# Patient Record
Sex: MALE | Race: WHITE | NOT HISPANIC OR LATINO | Employment: OTHER | URBAN - NONMETROPOLITAN AREA
[De-identification: names, ages, dates, MRNs, and addresses within clinical notes are randomized per-mention and may not be internally consistent; named-entity substitution may affect disease eponyms.]

---

## 2017-03-30 ENCOUNTER — HISTORY (OUTPATIENT)
Dept: FAMILY MEDICINE | Facility: OTHER | Age: 62
End: 2017-03-30

## 2017-03-30 ENCOUNTER — OFFICE VISIT - GICH (OUTPATIENT)
Dept: FAMILY MEDICINE | Facility: OTHER | Age: 62
End: 2017-03-30

## 2017-03-30 DIAGNOSIS — K60.2 ANAL FISSURE: ICD-10-CM

## 2017-03-30 DIAGNOSIS — L40.9 PSORIASIS: ICD-10-CM

## 2017-03-30 ASSESSMENT — PATIENT HEALTH QUESTIONNAIRE - PHQ9: SUM OF ALL RESPONSES TO PHQ QUESTIONS 1-9: 7

## 2017-04-14 ENCOUNTER — COMMUNICATION - GICH (OUTPATIENT)
Dept: INTERNAL MEDICINE | Facility: OTHER | Age: 62
End: 2017-04-14

## 2017-04-14 DIAGNOSIS — I10 ESSENTIAL (PRIMARY) HYPERTENSION: ICD-10-CM

## 2017-04-20 ENCOUNTER — COMMUNICATION - GICH (OUTPATIENT)
Dept: INTERNAL MEDICINE | Facility: OTHER | Age: 62
End: 2017-04-20

## 2017-04-20 DIAGNOSIS — I10 ESSENTIAL (PRIMARY) HYPERTENSION: ICD-10-CM

## 2017-05-04 ENCOUNTER — OFFICE VISIT - GICH (OUTPATIENT)
Dept: INTERNAL MEDICINE | Facility: OTHER | Age: 62
End: 2017-05-04

## 2017-05-04 ENCOUNTER — HISTORY (OUTPATIENT)
Dept: INTERNAL MEDICINE | Facility: OTHER | Age: 62
End: 2017-05-04

## 2017-05-04 DIAGNOSIS — R94.5 ABNORMAL RESULTS OF LIVER FUNCTION STUDIES: ICD-10-CM

## 2017-05-04 DIAGNOSIS — L40.9 PSORIASIS: ICD-10-CM

## 2017-05-04 DIAGNOSIS — Z00.00 ENCOUNTER FOR GENERAL ADULT MEDICAL EXAMINATION WITHOUT ABNORMAL FINDINGS: ICD-10-CM

## 2017-05-04 DIAGNOSIS — E78.5 HYPERLIPIDEMIA: ICD-10-CM

## 2017-05-04 DIAGNOSIS — I10 ESSENTIAL (PRIMARY) HYPERTENSION: ICD-10-CM

## 2017-05-04 LAB
A/G RATIO - HISTORICAL: 1.2 (ref 1–2)
ABSOLUTE BASOPHILS - HISTORICAL: 0.1 THOU/CU MM
ABSOLUTE EOSINOPHILS - HISTORICAL: 0.1 THOU/CU MM
ABSOLUTE LYMPHOCYTES - HISTORICAL: 1.7 THOU/CU MM (ref 0.9–2.9)
ABSOLUTE MONOCYTES - HISTORICAL: 0.7 THOU/CU MM
ABSOLUTE NEUTROPHILS - HISTORICAL: 5.1 THOU/CU MM (ref 1.7–7)
ALBUMIN SERPL-MCNC: 4.2 G/DL (ref 3.5–5.7)
ALP SERPL-CCNC: 122 IU/L (ref 34–104)
ALT (SGPT) - HISTORICAL: 48 IU/L (ref 7–52)
ANION GAP - HISTORICAL: 9 (ref 5–18)
AST SERPL-CCNC: 39 IU/L (ref 13–39)
BASOPHILS # BLD AUTO: 0.9 %
BILIRUB SERPL-MCNC: 0.5 MG/DL (ref 0.3–1)
BILIRUB UR QL: NEGATIVE
BUN SERPL-MCNC: 17 MG/DL (ref 7–25)
BUN/CREAT RATIO - HISTORICAL: 19
CALCIUM SERPL-MCNC: 9.4 MG/DL (ref 8.6–10.3)
CHLORIDE SERPLBLD-SCNC: 104 MMOL/L (ref 98–107)
CHOL/HDL RATIO - HISTORICAL: 4.18
CHOLESTEROL TOTAL: 184 MG/DL
CLARITY, URINE: CLEAR CLARITY
CO2 SERPL-SCNC: 26 MMOL/L (ref 21–31)
COLOR UR: YELLOW COLOR
CREAT SERPL-MCNC: 0.9 MG/DL (ref 0.7–1.3)
EOSINOPHIL NFR BLD AUTO: 1.3 %
ERYTHROCYTE [DISTWIDTH] IN BLOOD BY AUTOMATED COUNT: 12 % (ref 11.5–15.5)
GFR IF NOT AFRICAN AMERICAN - HISTORICAL: >60 ML/MIN/1.73M2
GLOBULIN - HISTORICAL: 3.4 G/DL (ref 2–3.7)
GLUCOSE SERPL-MCNC: 99 MG/DL (ref 70–105)
GLUCOSE URINE: NEGATIVE MG/DL
HCT VFR BLD AUTO: 52.4 % (ref 37–53)
HDLC SERPL-MCNC: 44 MG/DL (ref 23–92)
HEMOGLOBIN: 16.8 G/DL (ref 13.5–17.5)
KETONES UR QL: NEGATIVE MG/DL
LDLC SERPL CALC-MCNC: 125 MG/DL
LEUKOCYTE ESTERASE URINE: NEGATIVE
LYMPHOCYTES NFR BLD AUTO: 22.4 % (ref 20–44)
MCH RBC QN AUTO: 30.3 PG (ref 26–34)
MCHC RBC AUTO-ENTMCNC: 32.1 G/DL (ref 32–36)
MCV RBC AUTO: 94 FL (ref 80–100)
MONOCYTES NFR BLD AUTO: 9.5 %
NEUTROPHILS NFR BLD AUTO: 65.8 % (ref 42–72)
NITRITE UR QL STRIP: NEGATIVE
NON-HDL CHOLESTEROL - HISTORICAL: 140 MG/DL
OCCULT BLOOD,URINE - HISTORICAL: NEGATIVE
PATIENT STATUS - HISTORICAL: ABNORMAL
PH UR: 6 [PH]
PLATELET # BLD AUTO: 232 THOU/CU MM (ref 140–440)
PMV BLD: 9.9 FL (ref 6.5–11)
POTASSIUM SERPL-SCNC: 4.1 MMOL/L (ref 3.5–5.1)
PROT SERPL-MCNC: 7.6 G/DL (ref 6.4–8.9)
PROTEIN QUALITATIVE,URINE - HISTORICAL: NEGATIVE MG/DL
PSA TOTAL (DIAGNOSTIC) - HISTORICAL: 0.31 NG/ML
RED BLOOD COUNT - HISTORICAL: 5.55 MIL/CU MM (ref 4.3–5.9)
SODIUM SERPL-SCNC: 139 MMOL/L (ref 133–143)
SP GR UR STRIP: 1.02
TRIGL SERPL-MCNC: 76 MG/DL
UROBILINOGEN,QUALITATIVE - HISTORICAL: NORMAL EU/DL
WHITE BLOOD COUNT - HISTORICAL: 7.7 THOU/CU MM (ref 4.5–11)

## 2017-05-04 ASSESSMENT — PATIENT HEALTH QUESTIONNAIRE - PHQ9: SUM OF ALL RESPONSES TO PHQ QUESTIONS 1-9: 0

## 2017-05-06 ENCOUNTER — AMBULATORY - GICH (OUTPATIENT)
Dept: FAMILY MEDICINE | Facility: OTHER | Age: 62
End: 2017-05-06

## 2017-05-08 ENCOUNTER — COMMUNICATION - GICH (OUTPATIENT)
Dept: INTERNAL MEDICINE | Facility: OTHER | Age: 62
End: 2017-05-08

## 2017-05-09 ENCOUNTER — AMBULATORY - GICH (OUTPATIENT)
Dept: SCHEDULING | Facility: OTHER | Age: 62
End: 2017-05-09

## 2017-05-10 ENCOUNTER — AMBULATORY - GICH (OUTPATIENT)
Dept: SCHEDULING | Facility: OTHER | Age: 62
End: 2017-05-10

## 2017-05-10 ENCOUNTER — COMMUNICATION - GICH (OUTPATIENT)
Dept: INTERNAL MEDICINE | Facility: OTHER | Age: 62
End: 2017-05-10

## 2017-05-16 ENCOUNTER — COMMUNICATION - GICH (OUTPATIENT)
Dept: INTERNAL MEDICINE | Facility: OTHER | Age: 62
End: 2017-05-16

## 2017-05-22 ENCOUNTER — COMMUNICATION - GICH (OUTPATIENT)
Dept: INTERNAL MEDICINE | Facility: OTHER | Age: 62
End: 2017-05-22

## 2017-05-22 DIAGNOSIS — I10 ESSENTIAL (PRIMARY) HYPERTENSION: ICD-10-CM

## 2017-06-02 ENCOUNTER — OFFICE VISIT - GICH (OUTPATIENT)
Dept: FAMILY MEDICINE | Facility: OTHER | Age: 62
End: 2017-06-02

## 2017-06-02 ENCOUNTER — HISTORY (OUTPATIENT)
Dept: FAMILY MEDICINE | Facility: OTHER | Age: 62
End: 2017-06-02
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
## 2017-06-02 DIAGNOSIS — L03.312 CELLULITIS OF BACK EXCEPT BUTTOCK: ICD-10-CM

## 2017-06-26 ENCOUNTER — OFFICE VISIT - GICH (OUTPATIENT)
Dept: INTERNAL MEDICINE | Facility: OTHER | Age: 62
End: 2017-06-26

## 2017-06-26 ENCOUNTER — HISTORY (OUTPATIENT)
Dept: INTERNAL MEDICINE | Facility: OTHER | Age: 62
End: 2017-06-26

## 2017-06-26 ENCOUNTER — COMMUNICATION - GICH (OUTPATIENT)
Dept: INTERNAL MEDICINE | Facility: OTHER | Age: 62
End: 2017-06-26

## 2017-06-26 DIAGNOSIS — L40.9 PSORIASIS: ICD-10-CM

## 2017-06-27 ENCOUNTER — AMBULATORY - GICH (OUTPATIENT)
Dept: INTERNAL MEDICINE | Facility: OTHER | Age: 62
End: 2017-06-27

## 2017-06-27 ENCOUNTER — AMBULATORY - GICH (OUTPATIENT)
Dept: SCHEDULING | Facility: OTHER | Age: 62
End: 2017-06-27

## 2017-06-27 ENCOUNTER — COMMUNICATION - GICH (OUTPATIENT)
Dept: INTERNAL MEDICINE | Facility: OTHER | Age: 62
End: 2017-06-27

## 2017-06-27 DIAGNOSIS — L40.9 PSORIASIS: ICD-10-CM

## 2017-06-28 ENCOUNTER — AMBULATORY - GICH (OUTPATIENT)
Dept: SCHEDULING | Facility: OTHER | Age: 62
End: 2017-06-28

## 2017-07-06 ENCOUNTER — COMMUNICATION - GICH (OUTPATIENT)
Dept: INTERNAL MEDICINE | Facility: OTHER | Age: 62
End: 2017-07-06

## 2017-07-06 DIAGNOSIS — I10 ESSENTIAL (PRIMARY) HYPERTENSION: ICD-10-CM

## 2017-07-07 ENCOUNTER — COMMUNICATION - GICH (OUTPATIENT)
Dept: INTERNAL MEDICINE | Facility: OTHER | Age: 62
End: 2017-07-07

## 2017-07-07 DIAGNOSIS — L40.9 PSORIASIS: ICD-10-CM

## 2017-07-13 ENCOUNTER — AMBULATORY - GICH (OUTPATIENT)
Dept: FAMILY MEDICINE | Facility: OTHER | Age: 62
End: 2017-07-13

## 2017-07-13 DIAGNOSIS — L40.9 PSORIASIS: ICD-10-CM

## 2017-12-11 ENCOUNTER — OFFICE VISIT - GICH (OUTPATIENT)
Dept: INTERNAL MEDICINE | Facility: OTHER | Age: 62
End: 2017-12-11

## 2017-12-11 ENCOUNTER — HISTORY (OUTPATIENT)
Dept: INTERNAL MEDICINE | Facility: OTHER | Age: 62
End: 2017-12-11

## 2017-12-11 DIAGNOSIS — L40.9 PSORIASIS: ICD-10-CM

## 2017-12-11 DIAGNOSIS — R21 RASH AND OTHER NONSPECIFIC SKIN ERUPTION: ICD-10-CM

## 2017-12-11 DIAGNOSIS — M25.50 PAIN IN JOINT: ICD-10-CM

## 2017-12-11 LAB
ABSOLUTE BASOPHILS - HISTORICAL: 0 THOU/CU MM
ABSOLUTE EOSINOPHILS - HISTORICAL: 0.1 THOU/CU MM
ABSOLUTE IMMATURE GRANULOCYTES(METAS,MYELOS,PROS) - HISTORICAL: 0 THOU/CU MM
ABSOLUTE LYMPHOCYTES - HISTORICAL: 1.7 THOU/CU MM (ref 0.9–2.9)
ABSOLUTE MONOCYTES - HISTORICAL: 0.5 THOU/CU MM
ABSOLUTE NEUTROPHILS - HISTORICAL: 4.1 THOU/CU MM (ref 1.7–7)
ALBUMIN SERPL-MCNC: 3.8 G/DL (ref 3.5–5.7)
AST SERPL-CCNC: 31 IU/L (ref 13–39)
BASOPHILS # BLD AUTO: 0.3 %
CREAT SERPL-MCNC: 0.85 MG/DL (ref 0.7–1.3)
EOSINOPHIL NFR BLD AUTO: 0.9 %
ERYTHROCYTE [DISTWIDTH] IN BLOOD BY AUTOMATED COUNT: 12.4 % (ref 11.5–15.5)
GFR IF NOT AFRICAN AMERICAN - HISTORICAL: >60 ML/MIN/1.73M2
HCT VFR BLD AUTO: 48.4 % (ref 37–53)
HEMOGLOBIN: 16.3 G/DL (ref 13.5–17.5)
IMMATURE GRANULOCYTES(METAS,MYELOS,PROS) - HISTORICAL: 0.5 %
LYMPHOCYTES NFR BLD AUTO: 26.3 % (ref 20–44)
MCH RBC QN AUTO: 31.1 PG (ref 26–34)
MCHC RBC AUTO-ENTMCNC: 33.7 G/DL (ref 32–36)
MCV RBC AUTO: 92 FL (ref 80–100)
MONOCYTES NFR BLD AUTO: 8.2 %
NEUTROPHILS NFR BLD AUTO: 63.8 % (ref 42–72)
PLATELET # BLD AUTO: 193 THOU/CU MM (ref 140–440)
PMV BLD: 11 FL (ref 6.5–11)
RED BLOOD COUNT - HISTORICAL: 5.24 MIL/CU MM (ref 4.3–5.9)
WHITE BLOOD COUNT - HISTORICAL: 6.4 THOU/CU MM (ref 4.5–11)

## 2017-12-11 ASSESSMENT — PATIENT HEALTH QUESTIONNAIRE - PHQ9: SUM OF ALL RESPONSES TO PHQ QUESTIONS 1-9: 0

## 2017-12-21 ENCOUNTER — COMMUNICATION - GICH (OUTPATIENT)
Dept: INTERNAL MEDICINE | Facility: OTHER | Age: 62
End: 2017-12-21

## 2017-12-21 DIAGNOSIS — I10 ESSENTIAL (PRIMARY) HYPERTENSION: ICD-10-CM

## 2017-12-27 NOTE — PROGRESS NOTES
Patient Information     Patient Name MRN Sex Cecilio Contreras [id number] Male 1955      Progress Notes by Noreen Oakes NP at 2017  6:15 PM     Author:  Noreen Oakes NP Service:  (none) Author Type:  PHYS- Nurse Practitioner     Filed:  6/3/2017  8:30 AM Encounter Date:  2017 Status:  Signed     :  Noreen Oakes NP (PHYS- Nurse Practitioner)            HPI:    Cecilio Nolan is a 62 y.o. male who presents to clinic today for deer tick bites. He has 2 tick bites, 1 behind right knee and 1 on back. Unsure how long ticks had been attached. Tick bite behind knee was removed on . Unsure when the back one fell off. Bite to back is red and painful. It is itchy as well. Hx of lyme's.     Past Medical History:     Diagnosis  Date     Elevated LFTs     negative biopsy      Hyperlipidemia      Hypertension      Psoriasis      Past Surgical History:      Procedure  Laterality Date     BIOPSY LIVER       COLONOSCOPY SCREENING  ,     WNL       HERNIA REPAIR Right      Social History       Substance Use Topics         Smoking status:  Former Smoker      Quit date: 1/15/2003      Smokeless tobacco:  Never Used      Alcohol use  1.2 oz/week     2 Standard drinks or equivalent per week      Current Outpatient Prescriptions       Medication  Sig Dispense Refill     aspirin 81 mg tablet Take 1 tablet by mouth once daily with a meal.  0     ixekizumab (TALTZ AUTOINJECTOR, 2 PACK,) 80 mg/mL atIn Inject  subcutaneous. 160 mg initial, then 80 mg q 2 weeks SQ 2 Syringe 5     lisinopril (PRINIVIL; ZESTRIL) 20 mg tablet TAKE 1 TABLET BY MOUTH EVERY DAY 60 tablet 0     metoprolol succinate (TOPROL XL) 50 mg sustained-release tablet Take 1 tablet by mouth once daily. 90 tablet 0     MULTIVITAMINS WITH FLUORIDE (MULTI-VITAMIN ORAL) Take  by mouth.       triamcinolone (ARISTOCORT; KENALOG) 0.1 % cream Apply  topically to affected area(s) 3 times daily. 80 g 3     No current  facility-administered medications for this visit.      Medications have been reviewed by me and are current to the best of my knowledge and ability.    No Known Allergies    ROS:  Pertinent positives and negatives are noted in HPI.    EXAM:  General appearance: well appearing male, in no acute distress  Respiratory: clear to auscultation bilaterally  Cardiac: RRR with no murmurs  Dermatological: behind right knee small red, raised bug bite. Back with 3 x 4 cm firm, red, warm lesion that is slightly blistered. Center with open area and clear to purulent drainage  Psychological: normal affect, alert and pleasant    ASSESSMENT/PLAN:    ICD-10-CM    1. Tick bite, initial encounter W57.XXXA doxycycline (VIBRAMYCIN) 100 mg capsule   2. Cellulitis of back except buttock L03.312 doxycycline (VIBRAMYCIN) 100 mg capsule   Bug bites, possibly ticks with back lesion showing signs of cellulitis. Tx with doxycycline to cover lymes. Discussed possible koebner phenomenon that may be causing new psoriasis flare. He has f/u with PCP next week. Reviewed need to complete all antibiotics. Discussed typical course of illness, symptomatic treatment and when to return to clinic. Patient in agreement with plan and all questions were answered.     Patient Instructions   Doxycycline twice daily for 14 days  Warm packs to back  Follow up as needed

## 2017-12-27 NOTE — PROGRESS NOTES
Patient Information     Patient Name MRN Sex Cecilio Contreras [id number] Male 1955      Progress Notes by Benjamin Linda MD at 2017  4:20 PM     Author:  Benjamin Linda MD Service:  (none) Author Type:  Physician     Filed:  2017  4:34 PM Encounter Date:  2017 Status:  Signed     :  Benjamin Linda MD (Physician)            SUBJECTIVE:    Cecilio Nolan is a 62 y.o. male who presents for follow-up on psoriasis.    HPI Comments: He comes in today for follow-up. He has significant psoriasis. 2 months ago we prescribed systemic treatment with Taltz. His insurance company required prior authorizations, of which I have done too. We have not heard back from his insurance company and he has not either. He certainly has not gotten any product to start treatment. He has been quite patient in this process but is in today obviously somewhat frustrated and he wonders what the next step might be. We have not gotten any denials either.    He did have his [place], this was negative. He recently had some tick bites or other types of insect bites and was treated with doxycycline, that has all resolved and is no longer an issue or concern. We of course have talked about the immune suppression that comes with medications such as Taltz.      No Known Allergies,   Current Outpatient Prescriptions     Medication  Sig     aspirin 81 mg tablet Take 1 tablet by mouth once daily with a meal.     ixekizumab (TALTZ AUTOINJECTOR, 2 PACK,) 80 mg/mL atIn Inject  subcutaneous. 160 mg initial, then 80 mg q 2 weeks SQ     lisinopril (PRINIVIL; ZESTRIL) 20 mg tablet TAKE 1 TABLET BY MOUTH EVERY DAY     metoprolol succinate (TOPROL XL) 50 mg sustained-release tablet Take 1 tablet by mouth once daily.     MULTIVITAMINS WITH FLUORIDE (MULTI-VITAMIN ORAL) Take  by mouth.     triamcinolone (ARISTOCORT; KENALOG) 0.1 % cream Apply  topically to affected area(s) 3 times daily.     No current facility-administered  medications for this visit.      Medications have been reviewed by me and are current to the best of my knowledge and ability. ,   Past Medical History:     Diagnosis  Date     Elevated LFTs     negative biopsy      Hyperlipidemia      Hypertension      Psoriasis     and   Patient Active Problem List       Diagnosis  Date Noted     PSORIASIS       HYPERLIPIDEMIA       LIVER FUNCTION TESTS, ABNORMAL       elevated, Negative biopsy.          HYPERTENSION       OBESITY       BMI: 34            REVIEW OF SYSTEMS:  Review of Systems   All other systems reviewed and are negative.      OBJECTIVE:  /84  Pulse 84  Wt 93.6 kg (206 lb 6.4 oz)  BMI 34.35 kg/m2    EXAM:   Physical Exam   Constitutional: He is well-developed, well-nourished, and in no distress. No distress.   Skin: He is not diaphoretic.   Nursing note and vitals reviewed.      ASSESSMENT/PLAN:    ICD-10-CM    1. Psoriasis L40.9         Plan:  Reviewed the situation with the patient. We have done the prior authorization but have not heard either positive or negative from the insurance company. We will need to contact them tomorrow to find out the status. If they are not going to approve Taltz, we will need to find out which medication they do provide coverage for.

## 2017-12-28 NOTE — PATIENT INSTRUCTIONS
Patient Information     Patient Name MRN Cecilio Wallace [id number] Male 1955      Patient Instructions by Noreen Oakes NP at 2017  6:15 PM     Author:  Noreen Oakes NP Service:  (none) Author Type:  PHYS- Nurse Practitioner     Filed:  2017  6:52 PM Encounter Date:  2017 Status:  Signed     :  Noreen Oakes NP (PHYS- Nurse Practitioner)            Doxycycline twice daily for 14 days  Warm packs to back  Follow up as needed

## 2017-12-28 NOTE — TELEPHONE ENCOUNTER
Patient Information     Patient Name MRN Sex Cecilio Contreras [id number] Male 1955      Telephone Encounter by Benjamin Linda MD at 2017  6:42 AM     Author:  Benjamin Linda MD Service:  (none) Author Type:  Physician     Filed:  2017  6:42 AM Encounter Date:  2017 Status:  Signed     :  Benjamin Linda MD (Physician)            I presume that this was for Taltz rather than Humira

## 2017-12-28 NOTE — TELEPHONE ENCOUNTER
Patient Information     Patient Name MRN Cecilio Wallace [id number] Male 1955      Telephone Encounter by Wesley Hankins RN at 2017  1:03 PM     Author:  Wesley Hankins RN Service:  (none) Author Type:  NURS- Registered Nurse     Filed:  2017  1:26 PM Encounter Date:  2017 Status:  Signed     :  Wesley Hankins RN (NURS- Registered Nurse)            Writer returned call to Jordana at [facility] as requested. Per Jordana, rx as listed below is not the correct dosing for Humira in relation to diagnosis of psoriasis:    Prescribing Provider: Rosario Aldrich MD                   Order Date: 2017  Ordered by: ROSARIO ALDRICH  Medication:adalimumab (HUMIRA) 40 mg/0.8 mL injection    Qty:2 Kit       Ref:10  Start:2017   End:              Route:Subcutaneous        Class:eRx    Sig:Inject 0.8 mL subcutaneous every 2 weeks.    Pharmacy:[facility] [address] [place] [address] AT [address]              OF [facility] [address] & [facility] [phone]    Correct dosing as per Jordana is as listed below:    Humira-40 mg/0.8 ml injection-inject 1.6 mls the first week, then inject 0.8 mls every other week starting one week after the loading dose (1.6 mls).    Jordana is requesting that a new rx be sent in to their pharmacy for processing as noted above. Writer advised Jordana, pharmacy intern that he would keon up rx as requested, and send to Dr. Aldrich for his consideration/approval. Jordana states understanding.    Unable to complete prescription refill per RN Medication Refill Policy.................... Wesley Hankins RN ....................  2017   1:19 PM

## 2017-12-28 NOTE — TELEPHONE ENCOUNTER
Patient Information     Patient Name MRN Cecilio Wallace [id number] Male 1955      Telephone Encounter by Tino Rosenberg at 2017  4:47 PM     Author:  Tino Rosenberg Service:  (none) Author Type:  (none)     Filed:  2017  4:59 PM Encounter Date:  2017 Status:  Signed     :  Tino Rosenberg            Please send new Rx for Humira to [facility].  Tino Rosenberg LPN ....................  2017   4:55 PM

## 2017-12-28 NOTE — TELEPHONE ENCOUNTER
Patient Information     Patient Name MRN Sex Cecilio Contreras [id number] Male 1955      Telephone Encounter by Chanel Will at 2017  1:32 PM     Author:  Chanel Will Service:  (none) Author Type:  (none)     Filed:  2017  1:33 PM Encounter Date:  2017 Status:  Signed     :  Still, Allison            Called [facility] and they sent this to [place] [facility] and I was unable to get through with the number provided .  Chanel Will LPN ....................2017  1:33 PM

## 2017-12-30 NOTE — NURSING NOTE
Patient Information     Patient Name MRN Sex Cecilio Contreras [id number] Male 1955      Nursing Note by Lisette Farooq LPN at 2017  4:20 PM     Author:  Lisette Farooq LPN Service:  (none) Author Type:  NURS- Licensed Practical Nurse     Filed:  2017  4:23 PM Encounter Date:  2017 Status:  Signed     :  Lisette Farooq LPN (NURS- Licensed Practical Nurse)            The patient is here today to have a follow up on his psoriasis injections.  Lisette Farooq LPN......2017  4:18 PM

## 2017-12-30 NOTE — NURSING NOTE
Patient Information     Patient Name MRN Sex Cecilio Contreras [id number] Male 1955      Nursing Note by Heena Dalton RN at 2017  8:30 AM     Author:  Heena Dalton RN Service:  (none) Author Type:  NURS- Registered Nurse     Filed:  2017  9:23 AM Encounter Date:  2017 Status:  Signed     :  Heena Dalton RN (NURS- Registered Nurse)            Pt [facility] own medication from  [facility] to learn how to administer to himself today.  Check dates for expiration on vial and correct dose of med in it to use and no particulates in it noted.  Injection areas for SQ injections with caution if abd used to avoid 2 inches from umbilicus out. Pt using abd only since psoriasis on arms and legs and backside.  No injections into the scars or stretch marks or bruises.  Pt able to demonstrate maintaining sterility of the needle for injection today and injected into with nursing observation.  Dose is Humira 40mg/0.8 ml x 2 for initial dose today and then every other week of 40 mg .  Pt cautioned to make sure that the syringe which is under the # 1 be place firmly against the skin  and  When cap # 2 pulled off that this is not touched till the syringe is firmly against the skin and window on syringe is viewable to watch for yellow to cover the indication window and that  the injection is complete.  Pt able to do this and questions answered to make sure and keep on calender for consistency of dose and in same time range.  Pt acknowledges understanding of the information given to him. Pt referred to MD for question of ability to use alcohol with this medicine.  Greater then 30 min spent with education.  HEENA DALTON RN ....................  2017   9:02 AM

## 2018-01-04 NOTE — PROGRESS NOTES
Patient Information     Patient Name MRN Cecilio Wallace [id number] Male 1955      Progress Notes by Benjamin Linda MD at 2017  8:20 AM     Author:  Benjamin Linda MD Service:  (none) Author Type:  Physician     Filed:  2017  9:44 AM Encounter Date:  2017 Status:  Signed     :  Benjamin Linda MD (Physician)            SUBJECTIVE:    Cecilio Nolan is a 62 y.o. male who presents for comprehensive review of their multiple medical problems and review of medications, renewal of medications and update on necessary health maintenance issues.      HPI Comments: He is here today for complete evaluation and a review of his chronic medical problems. He has a history of psoriasis. He's been reluctant to have any treatment in the past but is now having enough problems and with the newer medications available he would like to try something. He has been doing a little bit of research on this.    He has a history of hyperlipidemia which is currently not treated. This is primarily because of the history of his elevated liver tests. He has history of hypertension and is on multidrug therapy for this. He tolerates his medications without difficulty. His blood pressure is well controlled. He does not have any end organ disease.    There is a fairly substantial history of heart disease in his family but he does not seem to be troubled by this. He is due for a tetanus shot. He is up-to-date with colonoscopy. He recently had some troubles with a rectal fissure but that has resolved. He has no other complaints or concerns. He is up-to-date with all other health measures. I spent time today updating his past medical history, past surgical history, family history and social history.      No Known Allergies,   Current Outpatient Prescriptions     Medication  Sig     aspirin 81 mg tablet Take 1 tablet by mouth once daily with a meal.     lisinopril (PRINIVIL; ZESTRIL) 20 mg tablet TAKE 1 TABLET BY MOUTH  EVERY DAY     metoprolol succinate (TOPROL XL) 50 mg sustained-release tablet TAKE 1 TABLET BY MOUTH EVERY DAY     MULTIVITAMINS WITH FLUORIDE (MULTI-VITAMIN ORAL) Take  by mouth.     triamcinolone (ARISTOCORT; KENALOG) 0.1 % cream Apply  topically to affected area(s) 3 times daily.     No current facility-administered medications for this visit.      Medications have been reviewed by me and are current to the best of my knowledge and ability. ,   Past Medical History:     Diagnosis  Date     Elevated LFTs     negative biopsy      Hyperlipidemia      Hypertension      Psoriasis    ,   Patient Active Problem List       Diagnosis  Date Noted     PSORIASIS       HYPERLIPIDEMIA       LIVER FUNCTION TESTS, ABNORMAL       elevated, Negative biopsy.          HYPERTENSION       OBESITY       BMI: 34        ,   Past Surgical History:      Procedure  Laterality Date     BIOPSY LIVER       COLONOSCOPY SCREENING  ,     WNL       HERNIA REPAIR Right     and   Social History       Substance Use Topics         Smoking status:  Former Smoker      Quit date: 1/15/2003      Smokeless tobacco:  Never Used      Alcohol use  1.2 oz/week     2 Standard drinks or equivalent per week      Family Status     Relation  Status     Father     CHF, DM      Mother     Psoriatic arthritis, ASCAD, Dementia      Brother Alive     Brother Alive     Brother      Sister Alive     Brother      Brother      Brother      Social History     Social History        Marital status:  Single     Spouse name: N/A     Number of children:  N/A     Years of education:  N/A     Social History Main Topics          Smoking status:   Former Smoker      Quit date:  1/15/2003      Smokeless tobacco:   Never Used      Alcohol use   1.2 oz/week     2 Standard drinks or equivalent per week       Drug use:   Yes       Comment: Marijuana occasionally        Sexual activity:   Not Asked      Other Topics  Concern     None   "    Social History Narrative     He is single, works at [employer].  He lives in [place].             REVIEW OF SYSTEMS:  Review of Systems   Constitutional: Negative for chills, diaphoresis, fever, malaise/fatigue and weight loss.   HENT: Negative for congestion, ear pain, nosebleeds, sore throat and tinnitus.    Eyes: Negative for blurred vision, double vision, photophobia, pain, discharge and redness.   Respiratory: Negative for cough, hemoptysis, sputum production, shortness of breath and wheezing.    Cardiovascular: Negative for chest pain, palpitations, orthopnea, claudication, leg swelling and PND.   Gastrointestinal: Negative for abdominal pain, blood in stool, constipation, diarrhea, heartburn, nausea and vomiting.   Genitourinary: Negative for dysuria, flank pain and hematuria.   Musculoskeletal: Negative for back pain, joint pain, myalgias and neck pain.   Skin: Negative for itching and rash.   Neurological: Negative for dizziness, tingling, tremors, speech change, loss of consciousness, weakness and headaches.   Psychiatric/Behavioral: Negative for depression, hallucinations, memory loss, substance abuse and suicidal ideas. The patient is not nervous/anxious.        OBJECTIVE:  /76  Pulse 72  Ht 1.689 m (5' 6.5\")  Wt 91.8 kg (202 lb 6.4 oz)  BMI 32.18 kg/m2    EXAM:   Physical Exam   Constitutional: He is oriented to person, place, and time and well-developed, well-nourished, and in no distress. No distress.   HENT:   Head: Normocephalic and atraumatic.   Right Ear: Tympanic membrane and external ear normal.   Left Ear: Tympanic membrane and external ear normal.   Nose: Nose normal.   Mouth/Throat: Oropharynx is clear and moist and mucous membranes are normal. No oropharyngeal exudate.   Eyes: Conjunctivae are normal. Pupils are equal, round, and reactive to light. No scleral icterus.   Neck: Normal range of motion. Neck supple. Normal carotid pulses, no hepatojugular reflux and no JVD " present. Carotid bruit is not present. No tracheal deviation and no edema present. No thyroid mass and no thyromegaly present.   Cardiovascular: Normal rate, regular rhythm, normal heart sounds and intact distal pulses.  Exam reveals no gallop and no friction rub.    No murmur heard.  Pulmonary/Chest: Effort normal and breath sounds normal. No respiratory distress. He has no decreased breath sounds. He has no wheezes. He has no rhonchi. He has no rales. He exhibits no tenderness.   Abdominal: Soft. Bowel sounds are normal. He exhibits no distension and no mass. There is no hepatosplenomegaly. There is no tenderness. There is no rebound and no guarding. Hernia confirmed negative in the right inguinal area and confirmed negative in the left inguinal area.   Genitourinary: Rectum normal, prostate normal, testes/scrotum normal and penis normal.   Genitourinary Comments: Prostate small and firm   Musculoskeletal: Normal range of motion. He exhibits no edema or tenderness.   Lymphadenopathy:     He has no cervical adenopathy.   Neurological: He is alert and oriented to person, place, and time. He has normal motor skills. He displays normal reflexes. No cranial nerve deficit. He exhibits normal muscle tone. Gait normal. Coordination normal.   Skin: Skin is warm and dry. No rash noted. He is not diaphoretic. No cyanosis or erythema. No pallor. Nails show no clubbing.   Extensive psoriasis, most notable over his flexor surfaces   Psychiatric: Mood, memory, affect and judgment normal.   Nursing note and vitals reviewed.      ASSESSMENT/PLAN:    ICD-10-CM    1. Psoriasis L40.9 OMNI TDAP VACCINE IM      COMPLETE METABOLIC PANEL      CBC WITH DIFFERENTIAL      OMNI AMB MANTOUX      ixekizumab (TALTZ AUTOINJECTOR, 2 PACK,) 80 mg/mL atIn   2. Hyperlipidemia, unspecified hyperlipidemia type E78.5 LIPID PANEL   3. LIVER FUNCTION TESTS, ABNORMAL R94.5    4. HYPERTENSION I10    5. Health care maintenance Z00.00 PSA, TOTAL       URINALYSIS W REFLEX MICROSCOPIC IF POSITIVE        Plan:  Other than his psoriasis, his exam today is normal. He will continue with his current medications. Complete lab drawn and pending, I will send him a letter with the results. Tetanus booster given today.    For his psoriasis, we reviewed options. I elected to start him on Taltz. Warned of possible side effects, mainly that of immunosuppression. He will not start on this until he has a negative Mantoux read. This was reviewed with him. I will get him into see the shot nurse next week for instruction on how to give this medication subcutaneously. Dosing will be 160 mg initially and then 80 mg subcutaneous every 2 weeks. At 3 months we will reassess and presumably go to monthly dosing thereafter.

## 2018-01-04 NOTE — TELEPHONE ENCOUNTER
Patient Information     Patient Name MRN Sex Cecilio Contreras [id number] Male 1955      Telephone Encounter by Lisette Farooq LPN at 2017  1:36 PM     Author:  Lisette Farooq LPN Service:  (none) Author Type:  NURS- Licensed Practical Nurse     Filed:  2017  1:43 PM Encounter Date:  2017 Status:  Signed     :  Lisette Farooq LPN (NURS- Licensed Practical Nurse)            [facility] was contacted and given the directions below.  Lisette Farooq LPN......2017  1:36 PM

## 2018-01-04 NOTE — NURSING NOTE
Patient Information     Patient Name MRN Cecilio Wallace [id number] Male 1955      Nursing Note by Mikki Wilks at 3/30/2017  8:30 AM     Author:  Mikki Wilks Service:  (none) Author Type:  (none)     Filed:  3/30/2017  8:48 AM Encounter Date:  3/30/2017 Status:  Signed     :  Mikki Wilks            Patient is here for concerns of anal bleeding started 2 days ago. Was seen for hemorrhoids but stopped bleeding but states still has lump.  Mikki Wilks LPN .............3/30/2017  8:30 AM

## 2018-01-04 NOTE — TELEPHONE ENCOUNTER
Patient Information     Patient Name MRN Sex Cecilio Contreras [id number] Male 1955      Telephone Encounter by Darlene Pedro RN at 2017 10:31 AM     Author:  Darlene Pedro RN Service:  (none) Author Type:  NURS- Registered Nurse     Filed:  2017 10:36 AM Encounter Date:  2017 Status:  Signed     :  Darlene Pedro RN (NURS- Registered Nurse)            [facility] calling in regards to Rx received for Taltz.    Pharmacy looking for clarification as Rx on Taltz.  State is noted for starter dose but Rx has refills on it. Would like clarification on Rx for Maintenance or new Rx sent.    DARLENE PEDRO RN ....................  2017   10:36 AM

## 2018-01-04 NOTE — NURSING NOTE
Patient Information     Patient Name MRN Sex Cecilio Contreras [id number] Male 1955      Nursing Note by Lisette Farooq LPN at 2017 12:00 PM     Author:  Lisette Farooq LPN Service:  (none) Author Type:  NURS- Licensed Practical Nurse     Filed:  2017  9:08 AM Encounter Date:  2017 Status:  Signed     :  Lisette Farooq LPN (NURS- Licensed Practical Nurse)            The patient is here today to have his mantoux read from 2017.  The patient was given the mantoux on his left lower forearm and today it is negative.   Lisette Farooq LPN......2017  9:06 AM

## 2018-01-04 NOTE — TELEPHONE ENCOUNTER
Patient Information     Patient Name MRN Sex Cecilio Contreras [id number] Male 1955      Telephone Encounter by Benjamin Linda MD at 2017 12:27 PM     Author:  Benjamin Linda MD Service:  (none) Author Type:  Physician     Filed:  2017 12:27 PM Encounter Date:  2017 Status:  Signed     :  Benjamin Linda MD (Physician)            He will take 160 mg initially and then 80 mg every 2 weeks thereafter for 3 months.

## 2018-01-04 NOTE — PROGRESS NOTES
Patient Information     Patient Name MRN Sex Cecilio Contreras [id number] Male 1955      Progress Notes by Dee Dee Zee MD at 3/30/2017  8:30 AM     Author:  Dee Dee Zee MD Service:  (none) Author Type:  Physician     Filed:  4/3/2017 10:58 AM Encounter Date:  3/30/2017 Status:  Signed     :  Dee Dee Zee MD (Physician)            SUBJECTIVE:    Cecilio Nolan is a 62 y.o. male who presents for bright red blood in stool    HPI Comments: Started 2 days ago, painful defecation  Recent constipation  Has s truggled with hemorrhoids in the past  He denies NSAIDs, n/v  Had c-scope within the past 2 years, normal  He has severe psoriasis and uses only an OTC lotion, it is particularly bad on his buttocks      No Known Allergies,   Current Outpatient Prescriptions on File Prior to Visit       Medication  Sig Dispense Refill     aspirin 81 mg tablet Take 1 tablet by mouth once daily with a meal.  0     lisinopril (PRINIVIL; ZESTRIL) 20 mg tablet TAKE 1 TABLET BY MOUTH EVERY DAY 90 tablet 2     metoprolol succinate (TOPROL XL) 50 mg sustained-release tablet TAKE 1 TABLET BY MOUTH EVERY DAY 90 tablet 2     MULTIVITAMINS WITH FLUORIDE (MULTI-VITAMIN ORAL) Take  by mouth.       phenylephrine-shark liver oil-mineral oil (PREPARATION H) rectal ointment Insert  rectally 2 times daily if needed for Hemorrhoid Pain/Itch. 1 Tube 2     No current facility-administered medications on file prior to visit.     and   Patient Active Problem List       Diagnosis  Date Noted     PSORIASIS       HYPERLIPIDEMIA       LIVER FUNCTION TESTS, ABNORMAL       elevated, Negative biopsy.          HYPERTENSION       OBESITY       BMI: 34            REVIEW OF SYSTEMS:  Review of Systems   Constitutional: Negative for chills, diaphoresis, fever, malaise/fatigue and weight loss.   Gastrointestinal: Positive for blood in stool. Negative for abdominal pain, constipation, diarrhea, heartburn, melena, nausea and  "vomiting.   Genitourinary: Negative for dysuria and urgency.   Skin: Positive for itching and rash.   Neurological: Negative for dizziness, tingling and weakness.       OBJECTIVE:  /83  Pulse 88  Ht 1.676 m (5' 6\")  Wt 92.5 kg (204 lb)  BMI 32.93 kg/m2    EXAM:   Physical Exam   Constitutional: He is well-developed, well-nourished, and in no distress.   Abdominal: Soft. Bowel sounds are normal. He exhibits no distension and no mass. There is no tenderness. There is no rebound and no guarding.   Genitourinary:   Genitourinary Comments: Small hemorrhoid, not bleeding  Tenderness and defect at 9 0'clock, BRPR  Severe rash involving buttocks and anus   Skin: Rash noted.       ASSESSMENT/PLAN:    ICD-10-CM    1. Anal fissure K60.2 NIFEdipine powd   2. Psoriasis L40.9 triamcinolone (ARISTOCORT; KENALOG) 0.1 % cream        Plan:  Keep stools soft, stool softeners and/or fiber  Sitz baths    Patient Instructions   Anal fissure is a tear in the skin of the anus causing pain and bleeding  Apply ointment in and around anus 2-4 time daily x 4 weeks    Also need to start cream for psoriasis      Dee Dee Ahmadi MD  10:58 AM 4/3/2017           "

## 2018-01-04 NOTE — TELEPHONE ENCOUNTER
Patient Information     Patient Name MRN Sex Cecilio Contreras [id number] Male 1955      Telephone Encounter by America Ladd RN at 2017  7:59 AM     Author:  America Ladd RN Service:  (none) Author Type:  NURS- Registered Nurse     Filed:  2017  8:02 AM Encounter Date:  2017 Status:  Signed     :  America Ladd RN (NURS- Registered Nurse)            Ace Inhibitors    Office visit in the past 12 months or per provider note.    Last visit with ROSARIO ALDRICH was on: 2016 in [facility]  Next visit with ROSARIO ALDRICH is on: No future appointment listed with this provider  Next visit with Internal Medicine is on: No future appointment listed in this department    Lab test requirements:  Creatinine and Potassium annually, if ordering lab, order BMP.  CREATININE (mg/dL)    Date Value   2016 0.89     POTASSIUM (mmol/L)    Date Value   2016 4.3       Max refill for 12 months from last office visit or per provider note.  Patient is due for an appointment and labs, letter sent.  Prescription refilled per RN Medication Refill Policy.................... AMERICA LADD RN ....................  2017   7:59 AM

## 2018-01-04 NOTE — PATIENT INSTRUCTIONS
Patient Information     Patient Name MRN Cecilio Wallace [id number] Male 1955      Patient Instructions by Dee Dee Zee MD at 3/30/2017  8:30 AM     Author:  Dee Dee Zee MD Service:  (none) Author Type:  Physician     Filed:  3/30/2017  9:02 AM Encounter Date:  3/30/2017 Status:  Signed     :  Dee Dee Zee MD (Physician)            Anal fissure is a tear in the skin of the anus causing pain and bleeding  Apply ointment in and around anus 2-4 time daily x 4 weeks    Also need to start cream for psoriasis

## 2018-01-04 NOTE — TELEPHONE ENCOUNTER
Patient Information     Patient Name MRN Sex Cecilio Contreras [id number] Male 1955      Telephone Encounter by Muna Sterling RN at 2017  8:20 AM     Author:  Muna Sterling RN Service:  (none) Author Type:  NURS- Registered Nurse     Filed:  2017  8:22 AM Encounter Date:  2017 Status:  Signed     :  Muna Sterling RN (NURS- Registered Nurse)            Beta Blockers     Office visit in the past 12 months or per provider note.    Last visit with ROSARIO ALDRICH was on: 2016 in [facility]  Next visit with ROSARIO ALDRICH is on: No future appointment listed with this provider  Next visit with Internal Medicine is on: No future appointment listed in this department    Max refill for 12 months from last office visit or per provider note. Saw [facility] last month but due for annual exam    Due for exam.  Limited refill per protocol and letter mailed.  MUNA STERLING RN ........   2017    8:21 AM

## 2018-01-04 NOTE — NURSING NOTE
Patient Information     Patient Name MRN Sex Cecilio Contreras [id number] Male 1955      Nursing Note by Lisette Farooq LPN at 2017  8:20 AM     Author:  Lisette Farooq LPN Service:  (none) Author Type:  NURS- Licensed Practical Nurse     Filed:  2017  8:35 AM Encounter Date:  2017 Status:  Signed     :  Lisette Farooq LPN (NURS- Licensed Practical Nurse)            The patient is here today for a annual review and refill on his medications. The patient has not had a eye exam in the last year. The patient declined mychart today.  Lisette Farooq LPN......2017  8:14 AM

## 2018-01-05 NOTE — TELEPHONE ENCOUNTER
Patient Information     Patient Name MRN Cecilio Wallace [id number] Male 1955      Telephone Encounter by Heena Hernandez RN at 5/10/2017  8:27 AM     Author:  Heena Hernadnez RN Service:  (none) Author Type:  NURS- Registered Nurse     Filed:  5/10/2017 10:33 AM Encounter Date:  5/10/2017 Status:  Signed     :  Heena Hernandez RN (NURS- Registered Nurse)            Message left for pt to return phone call to [facility] at [phone] for an important message about upcomming appointment.  Need to inform the pt to bring all papers and information with him , as well as another person if they will be doing injection for him.  iand to be due for shot that day so can do first injection at clinic with RN supervision of this. HEENA HERNANDEZ RN ....................  5/10/2017   8:30 AM   Pt returned call and he does not have the medicine yet. Pt informed to reschedule appointment when the medicine is in and to bring all the papers and medication with to the appointment so can given self shot that day then. Pt acknowledges understanding of the information given to him. Appointment cancelled. HEENA HERNANDEZ RN ....................  5/10/2017   10:32 AM

## 2018-01-05 NOTE — TELEPHONE ENCOUNTER
Patient Information     Patient Name MRN Sex Cecilio Contreras [id number] Male 1955      Telephone Encounter by Lisette Farooq LPN at 2017  4:42 PM     Author:  Lisette Farooq LPN Service:  (none) Author Type:  NURS- Licensed Practical Nurse     Filed:  2017  4:43 PM Encounter Date:  2017 Status:  Signed     :  Lisette Farooq LPN (NURS- Licensed Practical Nurse)            The patient called and stated he needs a refill on his metoprolol 50mg. This is teed up below.  Lisette Farooq LPN......2017  4:43 PM

## 2018-01-05 NOTE — TELEPHONE ENCOUNTER
Patient Information     Patient Name MRN Sex Cecilio Contreras [id number] Male 1955      Telephone Encounter by Lisette Farooq LPN at 2017 10:13 AM     Author:  Lisette Farooq LPN Service:  (none) Author Type:  NURS- Licensed Practical Nurse     Filed:  2017 10:17 AM Encounter Date:  2017 Status:  Signed     :  Lisette Farooq LPN (NURS- Licensed Practical Nurse)            Contacted [facility] specialty pharmacy and they stated the patients taltz medication was approved and they will call his insurance company and get the approval dates for this medication.  Lisette Farooq LPN......2017  10:17 AM

## 2018-01-05 NOTE — TELEPHONE ENCOUNTER
Patient Information     Patient Name MRN Sex Cecilio Contreras [id number] Male 1955      Telephone Encounter by Lisette Farooq LPN at 2017  4:14 PM     Author:  Lisette Farooq LPN Service:  (none) Author Type:  NURS- Licensed Practical Nurse     Filed:  2017  4:27 PM Encounter Date:  2017 Status:  Signed     :  Lisette Farooq LPN (NURS- Licensed Practical Nurse)            Contacted [facility] pharmacy and she stated that they will fax us a PA form for his taltz medication for the doctor to fill out.  Lisette Farooq LPN......2017  4:27 PM

## 2018-01-27 VITALS
SYSTOLIC BLOOD PRESSURE: 144 MMHG | WEIGHT: 201.8 LBS | WEIGHT: 204 LBS | BODY MASS INDEX: 33.62 KG/M2 | BODY MASS INDEX: 32.78 KG/M2 | HEIGHT: 65 IN | DIASTOLIC BLOOD PRESSURE: 83 MMHG | HEART RATE: 88 BPM | SYSTOLIC BLOOD PRESSURE: 138 MMHG | DIASTOLIC BLOOD PRESSURE: 90 MMHG | HEART RATE: 88 BPM | TEMPERATURE: 98.2 F | HEIGHT: 66 IN

## 2018-01-27 VITALS
HEIGHT: 67 IN | DIASTOLIC BLOOD PRESSURE: 76 MMHG | SYSTOLIC BLOOD PRESSURE: 122 MMHG | BODY MASS INDEX: 31.77 KG/M2 | HEART RATE: 72 BPM | WEIGHT: 202.4 LBS

## 2018-01-27 VITALS
HEART RATE: 84 BPM | SYSTOLIC BLOOD PRESSURE: 144 MMHG | DIASTOLIC BLOOD PRESSURE: 84 MMHG | BODY MASS INDEX: 34.35 KG/M2 | WEIGHT: 206.4 LBS

## 2018-02-01 ENCOUNTER — DOCUMENTATION ONLY (OUTPATIENT)
Dept: FAMILY MEDICINE | Facility: OTHER | Age: 63
End: 2018-02-01

## 2018-02-01 PROBLEM — L40.9 PSORIASIS: Status: ACTIVE | Noted: 2018-02-01

## 2018-02-01 PROBLEM — R94.5 NONSPECIFIC ABNORMAL RESULTS OF LIVER FUNCTION STUDY: Status: ACTIVE | Noted: 2018-02-01

## 2018-02-01 PROBLEM — E78.5 HYPERLIPIDEMIA: Status: ACTIVE | Noted: 2018-02-01

## 2018-02-01 PROBLEM — E66.9 OBESITY: Status: ACTIVE | Noted: 2018-02-01

## 2018-02-01 PROBLEM — I10 HYPERTENSION: Status: ACTIVE | Noted: 2018-02-01

## 2018-02-01 RX ORDER — LISINOPRIL 20 MG/1
20 TABLET ORAL DAILY
COMMUNITY
Start: 2017-07-07 | End: 2018-07-19

## 2018-02-01 RX ORDER — METOPROLOL SUCCINATE 50 MG/1
50 TABLET, EXTENDED RELEASE ORAL DAILY
COMMUNITY
Start: 2017-12-22 | End: 2018-07-19

## 2018-02-01 RX ORDER — DIPHENOXYLATE HYDROCHLORIDE AND ATROPINE SULFATE 2.5; .025 MG/1; MG/1
TABLET ORAL
COMMUNITY
End: 2018-07-19

## 2018-02-01 RX ORDER — TRIAMCINOLONE ACETONIDE 1 MG/G
CREAM TOPICAL 3 TIMES DAILY
COMMUNITY
Start: 2017-03-30 | End: 2019-08-05

## 2018-02-04 ASSESSMENT — PATIENT HEALTH QUESTIONNAIRE - PHQ9
SUM OF ALL RESPONSES TO PHQ QUESTIONS 1-9: 7
SUM OF ALL RESPONSES TO PHQ QUESTIONS 1-9: 0

## 2018-02-09 VITALS
DIASTOLIC BLOOD PRESSURE: 74 MMHG | BODY MASS INDEX: 34.35 KG/M2 | SYSTOLIC BLOOD PRESSURE: 138 MMHG | HEART RATE: 72 BPM | WEIGHT: 206.4 LBS

## 2018-02-10 ASSESSMENT — PATIENT HEALTH QUESTIONNAIRE - PHQ9: SUM OF ALL RESPONSES TO PHQ QUESTIONS 1-9: 0

## 2018-02-12 NOTE — NURSING NOTE
Patient Information     Patient Name MRN Sex Cecilio Contreras [id number] Male 1955      Nursing Note by Lisette Farooq LPN at 2017  8:20 AM     Author:  Lisette Farooq LPN Service:  (none) Author Type:  NURS- Licensed Practical Nurse     Filed:  2017  8:14 AM Encounter Date:  2017 Status:  Signed     :  Lisette Farooq LPN (NURS- Licensed Practical Nurse)            The patient is here today to have a three month follow up on his humira.  Lisette Farooq LPN......2017  8:09 AM

## 2018-02-12 NOTE — PROGRESS NOTES
Patient Information     Patient Name MRN Sex Cecilio Contreras [id number] Male 1955      Progress Notes by Benjamin Linda MD at 2017  8:20 AM     Author:  Benjamin Linda MD Service:  (none) Author Type:  Physician     Filed:  2017  8:26 AM Encounter Date:  2017 Status:  Signed     :  Benjamin Linda MD (Physician)            SUBJECTIVE:    Cecilio Nolan is a 62 y.o. male who presents for f/u on psoriasis.    HPI Comments: This patient is here today for follow-up on his psoriasis. We finally have him on therapy with Humira. He is using this every other week without difficulty. His psoriasis has basically gone away. He is thrilled with the results. He is not having any side effects with this. He is aware of the risk for immunosuppression, etc. He did get a flu shot this year.    He's been having some trouble with arthritis in his hands and wrists. He is deathly scared of developing psoriatic or other types of arthritis. His sister-in-law has this and his mother also had significant arthritis. It's actually somewhat better lately. It was bothersome earlier in the fall.    He also has a rash between his buttocks that does not seem to clear up. He's been using some cortisone lotion on this area to no avail. He wonders what else she could try.      No Known Allergies,   Current Outpatient Prescriptions     Medication  Sig     adalimumab (HUMIRA) 40 mg/0.8 mL injection Inject 0.8 mL subcutaneous every 2 weeks.     aspirin 81 mg tablet Take 1 tablet by mouth once daily with a meal.     lisinopril (PRINIVIL; ZESTRIL) 20 mg tablet TAKE 1 TABLET BY MOUTH EVERY DAY     metoprolol succinate (TOPROL XL) 50 mg sustained-release tablet Take 1 tablet by mouth once daily.     MULTIVITAMINS WITH FLUORIDE (MULTI-VITAMIN ORAL) Take  by mouth.     triamcinolone (ARISTOCORT; KENALOG) 0.1 % cream Apply  topically to affected area(s) 3 times daily.     No current facility-administered medications for  this visit.      Medications have been reviewed by me and are current to the best of my knowledge and ability. ,   Past Medical History:     Diagnosis  Date     Elevated LFTs     negative biopsy      Hyperlipidemia      Hypertension      Psoriasis     and   Patient Active Problem List       Diagnosis  Date Noted     PSORIASIS       HYPERLIPIDEMIA       LIVER FUNCTION TESTS, ABNORMAL       elevated, Negative biopsy.          HYPERTENSION       OBESITY       BMI: 34            REVIEW OF SYSTEMS:  Review of Systems   All other systems reviewed and are negative.      OBJECTIVE:  /74  Pulse 72  Wt 93.6 kg (206 lb 6.4 oz)  BMI 34.35 kg/m2    EXAM:   Physical Exam   Constitutional: He is well-developed, well-nourished, and in no distress. No distress.   Musculoskeletal:   There is no synovitis or other abnormalities of his hand joints   Skin: He is not diaphoretic.   Marked improvement in psoriasis.    He does have a nonspecific dermatitis in the crease of the buttocks.   Nursing note and vitals reviewed.      ASSESSMENT/PLAN:    ICD-10-CM    1. Arthralgia, unspecified joint M25.50    2. Psoriasis L40.9 adalimumab (HUMIRA) 40 mg/0.8 mL injection      CBC WITH DIFFERENTIAL      AST (SGOT)      CREATININE      ALBUMIN   3. Rash and nonspecific skin eruption R21         Plan:  His psoriasis control is excellent. He will continue with the Humira without change. Lab is drawn and pending for monitoring, I will send him a letter with the results. He needs to recheck on this every 4 months.    Reassured regarding the arthritis. At this point in time I don't see any evidence for inflammatory arthritis but this will certainly need to be monitored going forward. Reassured regarding the skin rash, I recommended just a medicated powder instead of the cortisone cream to see if that would be helpful.

## 2018-02-12 NOTE — TELEPHONE ENCOUNTER
Patient Information     Patient Name MRN Sex Cecilio Contreras [id number] Male 1955      Telephone Encounter by Regina Fournier RN at 2017  3:10 PM     Author:  Regina Fournier RN Service:  (none) Author Type:  NURS- Registered Nurse     Filed:  2017  3:13 PM Encounter Date:  2017 Status:  Signed     :  Regina Fournier RN (NURS- Registered Nurse)            Beta Blockers     Office visit in the past 12 months or per provider note.    Last visit with ROSARIO ALDRICH was on: 2017 in [facility]  Next visit with ROSARIO ALDRICH is on: No future appointment listed with this provider  Next visit with Internal Medicine is on: No future appointment listed in this department    Max refill for 12 months from last office visit or per provider note.    Prescription refilled per RN Medication Refill Policy.................... Regina Fournier RN ....................  2017   3:12 PM

## 2018-06-21 DIAGNOSIS — L40.9 PSORIASIS: Primary | ICD-10-CM

## 2018-06-21 NOTE — LETTER
June 22, 2018      Cecilio Nolan  [address]   [place] [address]        Dear Mr. Nolan,    Your pharmacy has requested a refill of Humira.  This medication request is being addressed.     According to our records, you are overdue for a follow-up visit with Dr. Mac. He had requested you return around April, 2018. Your health is very important to us. Please contact our scheduling line at [phone] to set up this appointment at your earliest convenience, and before your next medication refills are needed.     Thank you for choosing [facility] for your health care needs.     Sincerely,        The Refill Nurses  [facility]

## 2018-06-21 NOTE — LETTER
June 22, 2018      Cecilio Nolan  [address]   [place] [address]          Dear Mr. Nolan,    Your pharmacy has requested a refill of Humira.  This medication request is being addressed.     According to our records, you are overdue for a 4 month follow-up with Dr. Linda. Your health is very important to us. Please contact our scheduling line at [phone] to set up this appointment at your earliest convenience, and before your next medication refills are needed.     Thank you for choosing [facility] for your health care needs.     Sincerely,        The Refill Nurses  [facility]

## 2018-06-22 NOTE — TELEPHONE ENCOUNTER
Routing refill request to provider for review/approval because:  Drug not on the [facility] refill protocol   Patient needs to be seen because:  Was due for 4 month follow up in April.  Reminder letter sent. (Please note: Letter with wrong PCP name was not sent out)   Last filled at  on 12-11-17 for 2 kits X 11 refills.   Heather Estrella RN on 6/22/2018 at 12:27 PM

## 2018-07-16 ENCOUNTER — TELEPHONE (OUTPATIENT)
Dept: INTERNAL MEDICINE | Facility: OTHER | Age: 63
End: 2018-07-16

## 2018-07-16 DIAGNOSIS — E78.5 HYPERLIPIDEMIA, UNSPECIFIED HYPERLIPIDEMIA TYPE: ICD-10-CM

## 2018-07-16 DIAGNOSIS — L40.9 PSORIASIS: Primary | ICD-10-CM

## 2018-07-16 NOTE — TELEPHONE ENCOUNTER
The patient is coming in for his annual check and would like to do lab work prior to it. Please place orders.  Lisette Farooq LPN on 7/16/2018 at 4:21 PM

## 2018-07-16 NOTE — TELEPHONE ENCOUNTER
MAF:  Patient wants labs drawn prior to appt at 11:00 on 7/19/2018 and has lab appt at 7:20.  Please put in orders.      Thank you

## 2018-07-19 ENCOUNTER — OFFICE VISIT (OUTPATIENT)
Dept: INTERNAL MEDICINE | Facility: OTHER | Age: 63
End: 2018-07-19
Attending: INTERNAL MEDICINE
Payer: COMMERCIAL

## 2018-07-19 VITALS
HEART RATE: 72 BPM | WEIGHT: 208.4 LBS | DIASTOLIC BLOOD PRESSURE: 78 MMHG | SYSTOLIC BLOOD PRESSURE: 122 MMHG | BODY MASS INDEX: 34.68 KG/M2

## 2018-07-19 DIAGNOSIS — E78.5 HYPERLIPIDEMIA, UNSPECIFIED HYPERLIPIDEMIA TYPE: ICD-10-CM

## 2018-07-19 DIAGNOSIS — Z23 ENCOUNTER FOR IMMUNIZATION: ICD-10-CM

## 2018-07-19 DIAGNOSIS — L40.9 PSORIASIS: ICD-10-CM

## 2018-07-19 DIAGNOSIS — R73.9 HYPERGLYCEMIA: ICD-10-CM

## 2018-07-19 DIAGNOSIS — L40.9 PSORIASIS: Primary | ICD-10-CM

## 2018-07-19 DIAGNOSIS — I10 ESSENTIAL HYPERTENSION: ICD-10-CM

## 2018-07-19 LAB
ALBUMIN SERPL-MCNC: 4.1 G/DL (ref 3.5–5.7)
ALP SERPL-CCNC: 90 U/L (ref 34–104)
ALT SERPL W P-5'-P-CCNC: 35 U/L (ref 7–52)
ANION GAP SERPL CALCULATED.3IONS-SCNC: 7 MMOL/L (ref 3–14)
AST SERPL W P-5'-P-CCNC: 26 U/L (ref 13–39)
BILIRUB SERPL-MCNC: 0.4 MG/DL (ref 0.3–1)
BUN SERPL-MCNC: 20 MG/DL (ref 7–25)
CALCIUM SERPL-MCNC: 9 MG/DL (ref 8.6–10.3)
CHLORIDE SERPL-SCNC: 107 MMOL/L (ref 98–107)
CHOLEST SERPL-MCNC: 169 MG/DL
CO2 SERPL-SCNC: 25 MMOL/L (ref 21–31)
CREAT SERPL-MCNC: 0.89 MG/DL (ref 0.7–1.3)
ERYTHROCYTE [DISTWIDTH] IN BLOOD BY AUTOMATED COUNT: 12.5 % (ref 10–15)
GFR SERPL CREATININE-BSD FRML MDRD: 86 ML/MIN/1.7M2
GLUCOSE SERPL-MCNC: 126 MG/DL (ref 70–105)
HBA1C MFR BLD: 6.3 % (ref 4–6)
HCT VFR BLD AUTO: 47 % (ref 40–53)
HDLC SERPL-MCNC: 45 MG/DL (ref 23–92)
HGB BLD-MCNC: 15.7 G/DL (ref 13.3–17.7)
LDLC SERPL CALC-MCNC: 108 MG/DL
MCH RBC QN AUTO: 31.2 PG (ref 26.5–33)
MCHC RBC AUTO-ENTMCNC: 33.4 G/DL (ref 31.5–36.5)
MCV RBC AUTO: 93 FL (ref 78–100)
NONHDLC SERPL-MCNC: 124 MG/DL
PLATELET # BLD AUTO: 190 10E9/L (ref 150–450)
POTASSIUM SERPL-SCNC: 4.1 MMOL/L (ref 3.5–5.1)
PROT SERPL-MCNC: 6.8 G/DL (ref 6.4–8.9)
RBC # BLD AUTO: 5.03 10E12/L (ref 4.4–5.9)
SODIUM SERPL-SCNC: 139 MMOL/L (ref 134–144)
TRIGL SERPL-MCNC: 78 MG/DL
WBC # BLD AUTO: 5.9 10E9/L (ref 4–11)

## 2018-07-19 PROCEDURE — 99396 PREV VISIT EST AGE 40-64: CPT | Mod: 25 | Performed by: INTERNAL MEDICINE

## 2018-07-19 PROCEDURE — 90471 IMMUNIZATION ADMIN: CPT | Performed by: INTERNAL MEDICINE

## 2018-07-19 PROCEDURE — 83036 HEMOGLOBIN GLYCOSYLATED A1C: CPT | Performed by: INTERNAL MEDICINE

## 2018-07-19 PROCEDURE — 80061 LIPID PANEL: CPT | Performed by: INTERNAL MEDICINE

## 2018-07-19 PROCEDURE — 80053 COMPREHEN METABOLIC PANEL: CPT | Performed by: INTERNAL MEDICINE

## 2018-07-19 PROCEDURE — 85027 COMPLETE CBC AUTOMATED: CPT | Performed by: INTERNAL MEDICINE

## 2018-07-19 PROCEDURE — 36415 COLL VENOUS BLD VENIPUNCTURE: CPT | Performed by: INTERNAL MEDICINE

## 2018-07-19 PROCEDURE — 90670 PCV13 VACCINE IM: CPT | Performed by: INTERNAL MEDICINE

## 2018-07-19 RX ORDER — DIPHENOXYLATE HYDROCHLORIDE AND ATROPINE SULFATE 2.5; .025 MG/1; MG/1
1 TABLET ORAL DAILY
COMMUNITY
Start: 2018-07-19

## 2018-07-19 RX ORDER — METOPROLOL SUCCINATE 50 MG/1
50 TABLET, EXTENDED RELEASE ORAL DAILY
Qty: 90 TABLET | Refills: 3 | Status: SHIPPED | OUTPATIENT
Start: 2018-07-19 | End: 2019-08-05

## 2018-07-19 RX ORDER — LISINOPRIL 20 MG/1
20 TABLET ORAL DAILY
Qty: 90 TABLET | Refills: 3 | Status: SHIPPED | OUTPATIENT
Start: 2018-07-19 | End: 2019-08-05

## 2018-07-19 ASSESSMENT — ENCOUNTER SYMPTOMS
SINUS PAIN: 0
CHILLS: 0
WOUND: 0
FATIGUE: 0
TROUBLE SWALLOWING: 0
ABDOMINAL PAIN: 0
UNEXPECTED WEIGHT CHANGE: 0
SLEEP DISTURBANCE: 0
AGITATION: 0
FREQUENCY: 0
ADENOPATHY: 0
CONSTIPATION: 0
TREMORS: 0
WEAKNESS: 0
DYSURIA: 0
SINUS PRESSURE: 0
SORE THROAT: 0
HEADACHES: 0
NECK PAIN: 0
CONFUSION: 0
LIGHT-HEADEDNESS: 0
JOINT SWELLING: 0
DIFFICULTY URINATING: 0
BRUISES/BLEEDS EASILY: 0
SEIZURES: 0
CHEST TIGHTNESS: 0
EYE PAIN: 0
SPEECH DIFFICULTY: 0
BLOOD IN STOOL: 0
VOMITING: 0
DIARRHEA: 0
SHORTNESS OF BREATH: 0
VOICE CHANGE: 0
PALPITATIONS: 0
DIZZINESS: 0
HALLUCINATIONS: 0
FLANK PAIN: 0
RHINORRHEA: 0
NERVOUS/ANXIOUS: 0
DIAPHORESIS: 0
APPETITE CHANGE: 0
NECK STIFFNESS: 0
ABDOMINAL DISTENTION: 0
NUMBNESS: 0
EYE REDNESS: 0
BACK PAIN: 0
WHEEZING: 0
NAUSEA: 0
COUGH: 0
FEVER: 0
HEMATURIA: 0

## 2018-07-19 NOTE — NURSING NOTE
The patient is here today to be seen for a follow up on his medications and lab work.  Lisette Farooq LPN on 7/19/2018 at 10:57 AM

## 2018-07-19 NOTE — MR AVS SNAPSHOT
After Visit Summary   7/19/2018    Cecilio Nolan    MRN: [id number]           Patient Information     Date Of Birth          1955        Visit Information        Provider Department      7/19/2018 11:00 AM Benjamin Linda MD [facility]        Today's Diagnoses     Psoriasis    -  1    Essential hypertension        Hyperlipidemia, unspecified hyperlipidemia type        Encounter for immunization        Hyperglycemia           Follow-ups after your visit        Who to contact     If you have questions or need follow up information about today's clinic visit or your schedule please contact [facility] directly at [phone].  Normal or non-critical lab and imaging results will be communicated to you by MyChart, letter or phone within 4 business days after the clinic has received the results. If you do not hear from us within 7 days, please contact the clinic through MyChart or phone. If you have a critical or abnormal lab result, we will notify you by phone as soon as possible.  Submit refill requests through [employer] or call your pharmacy and they will forward the refill request to us. Please allow 3 business days for your refill to be completed.          Additional Information About Your Visit        Care EveryWhere ID     This is your Care EveryWhere ID. This could be used by other organizations to access your [facility] medical records  [id number]        Your Vitals Were     Pulse BMI (Body Mass Index)                72 34.68 kg/m2           Blood Pressure from Last 3 Encounters:   07/19/18 122/78   12/11/17 138/74   06/26/17 144/84    Weight from Last 3 Encounters:   07/19/18 208 lb 6.4 oz (94.5 kg)   12/11/17 206 lb 6.4 oz (93.6 kg)   06/26/17 206 lb 6.4 oz (93.6 kg)              We Performed the Following     GH IMM-  PNEUMOCOCCAL CONJ VACCINE 13 VALENT IM (Prevnar)          Today's Medication Changes          These changes are accurate as of  7/19/18 11:59 PM.  If you have any questions, ask your nurse or doctor.               These medicines have changed or have updated prescriptions.        Dose/Directions    HUMIRA PEN 40 MG/0.8ML pen kit   This may have changed:  Another medication with the same name was removed. Continue taking this medication, and follow the directions you see here.   Used for:  Psoriasis   Generic drug:  adalimumab   Changed by:  Benjamin Linda MD        INJECT THE CONTENTS OF 1 PEN (40 MG) UNDER THE SKIN EVERY OTHER WEEK   Quantity:  1 each   Refills:  10       MULTI-VITAMINS Tabs   This may have changed:    - how much to take  - when to take this   Changed by:  Benjamin Linda MD        Dose:  1 tablet   Take 1 tablet by mouth daily   Refills:  0            Where to get your medicines      These medications were sent to [id number] - [place], [place] - [address], [facility] [address]     Phone:  [phone]     lisinopril 20 MG tablet    metoprolol succinate 50 MG 24 hr tablet                Primary Care Provider Office Phone # Fax #    Benjamin Linda [phone] [address] [facility] [address]        Equal Access to Services     JASMIN PEREZ AH: Hadii ciaran renae hadasho Soomaali, waaxda luqadaha, qaybta kaalmada adeegyada, sol franco. So [facility] [phone].    ATENCIÓN: Si habla español, tiene a hinson disposición servicios gratuitos de asistencia lingüística. Llame al [phone].    We comply with applicable federal civil rights laws and [place] laws. We do not discriminate on the basis of race, color, national origin, age, disability, sex, sexual orientation, or gender identity.            Thank you!     Thank you for choosing [facility]  for your care. Our goal is always to provide you with excellent care. Hearing back from our patients is one way we can continue to improve our  services. Please take a few minutes to complete the written survey that you may receive in the mail after your visit with us. Thank you!             Your Updated Medication List - Protect others around you: Learn how to safely use, store and throw away your medicines at www.disposemymeds.org.          This list is accurate as of 7/19/18 11:59 PM.  Always use your most recent med list.                   Brand Name Dispense Instructions for use Diagnosis    aspirin 81 MG tablet      Take 81 mg by mouth daily with food        HUMIRA PEN 40 MG/0.8ML pen kit   Generic drug:  adalimumab     1 each    INJECT THE CONTENTS OF 1 PEN (40 MG) UNDER THE SKIN EVERY OTHER WEEK    Psoriasis       lisinopril 20 MG tablet    PRINIVIL/ZESTRIL    90 tablet    Take 1 tablet (20 mg) by mouth daily    Essential hypertension       metoprolol succinate 50 MG 24 hr tablet    TOPROL-XL    90 tablet    Take 1 tablet (50 mg) by mouth daily    Essential hypertension       MULTI-VITAMINS Tabs      Take 1 tablet by mouth daily        triamcinolone 0.1 % cream    KENALOG     Apply topically 3 times daily

## 2018-07-19 NOTE — PROGRESS NOTES
Chief Complaint:  This patient is here for a comprehensive review of their multiplemedical problems and review of medications, renewal of medications and update on necessary health maintenance issues.      HPI: This patient is here today for a physical.  In most respects he is feeling well.  He has a history of psoriasis and is being treated with Humira.  This is providing excellent control of his psoriasis and he is very happy with this therapy.  He is having no side effects as a result of this.    He also has hypertension.  He is on 2 drug therapy for control of this.  He tolerates the medications well and his blood pressure is controlled.    Medications are reconciled.  Past medical history, past surgical history, family history and social histories are all reviewed and updated today.    Past Medical History:   Diagnosis Date     Essential (primary) hypertension     No Comments Provided     Hyperlipidemia     No Comments Provided     Psoriasis     No Comments Provided     Transaminitis     negative liver biopsy       Past Surgical History:   Procedure Laterality Date     COLONOSCOPY      2003, 2010,WNL     HERNIA REPAIR Right 2009     LIVER BIOPSY  1995       Current Outpatient Prescriptions   Medication Sig Dispense Refill     aspirin 81 MG tablet Take 81 mg by mouth daily with food       HUMIRA PEN 40 MG/0.8ML pen kit INJECT THE CONTENTS OF 1 PEN (40 MG) UNDER THE SKIN EVERY OTHER WEEK 1 each 10     lisinopril (PRINIVIL/ZESTRIL) 20 MG tablet Take 1 tablet (20 mg) by mouth daily 90 tablet 3     metoprolol succinate (TOPROL-XL) 50 MG 24 hr tablet Take 1 tablet (50 mg) by mouth daily 90 tablet 3     Multiple Vitamin (MULTI-VITAMINS) TABS Take 1 tablet by mouth daily       triamcinolone (KENALOG) 0.1 % cream Apply topically 3 times daily       [DISCONTINUED] adalimumab (HUMIRA) 40 MG/0.8ML prefilled syringe kit Inject 40 mg Subcutaneous every 14 days       [DISCONTINUED] lisinopril (PRINIVIL/ZESTRIL) 20 MG tablet  Take 20 mg by mouth daily       [DISCONTINUED] metoprolol succinate (TOPROL-XL) 50 MG 24 hr tablet Take 50 mg by mouth daily         No Known Allergies    Family History   Problem Relation Age of Onset     Diabetes Father      Other - See Comments Father      adenomatous colonic polyps     Hyperlipidemia Father      HEART DISEASE Father      CHF     Arthritis Mother      Psoriatic arthritis     HEART DISEASE Brother      CABG     HEART DISEASE Brother      CABG     HEART DISEASE Brother      MI  @60     Other - See Comments Brother      Suicide     HEART DISEASE Brother      CABG     Family History Negative Sister      Good Health       Social History     Social History     Marital status: Single     Spouse name: N/A     Number of children: N/A     Years of education: N/A     Occupational History     Not on file.     Social History Main Topics     Smoking status: Former Smoker     Quit date: 1/15/2003     Smokeless tobacco: Never Used     Alcohol use 1.2 oz/week      Comment: Occasional     Drug use: No      Comment: Drug use: NoMarijuana occasionally     Sexual activity: No     Other Topics Concern     Not on file     Social History Narrative    He is single, works at [employer].  He lives in [place].       Review of Systems   Constitutional: Negative for appetite change, chills, diaphoresis, fatigue, fever and unexpected weight change.   HENT: Negative for ear pain, rhinorrhea, sinus pain, sinus pressure, sore throat, trouble swallowing and voice change.    Eyes: Negative for pain, redness and visual disturbance.   Respiratory: Negative for cough, chest tightness, shortness of breath and wheezing.    Cardiovascular: Negative for chest pain, palpitations and leg swelling.   Gastrointestinal: Negative for abdominal distention, abdominal pain, blood in stool, constipation, diarrhea, nausea and vomiting.   Endocrine: Negative for cold intolerance and heat intolerance.   Genitourinary: Negative for difficulty  urinating, dysuria, flank pain, frequency and hematuria.   Musculoskeletal: Negative for back pain, joint swelling, neck pain and neck stiffness.   Skin: Negative for rash and wound.   Allergic/Immunologic: Negative for immunocompromised state.   Neurological: Negative for dizziness, tremors, seizures, syncope, speech difficulty, weakness, light-headedness, numbness and headaches.   Hematological: Negative for adenopathy. Does not bruise/bleed easily.   Psychiatric/Behavioral: Negative for agitation, behavioral problems, confusion, hallucinations and sleep disturbance. The patient is not nervous/anxious.        Physical Exam   Constitutional: He is oriented to person, place, and time. He appears well-developed and well-nourished. No distress.   HENT:   Head: Normocephalic.   Right Ear: External ear normal.   Left Ear: External ear normal.   Nose: Nose normal.   Mouth/Throat: Oropharynx is clear and moist.   Eyes: Conjunctivae are normal. Pupils are equal, round, and reactive to light.   Neck: Normal range of motion. Neck supple. Normal carotid pulses and no JVD present. Carotid bruit is not present. No tracheal deviation present. No thyromegaly present.   Cardiovascular: Normal rate and regular rhythm.  Exam reveals no gallop and no friction rub.    No murmur heard.  Pulmonary/Chest: Effort normal and breath sounds normal. No respiratory distress. He has no wheezes. He has no rales.   Abdominal: Soft. Bowel sounds are normal. He exhibits no distension and no mass. There is no tenderness. There is no rebound and no guarding. No hernia.   Genitourinary: Rectum normal, prostate normal and penis normal.   Musculoskeletal: Normal range of motion. He exhibits no edema.   Lymphadenopathy:     He has no cervical adenopathy.   Neurological: He is alert and oriented to person, place, and time. He has normal reflexes. No cranial nerve deficit. He exhibits normal muscle tone. Coordination normal.   Skin: Skin is warm and dry.  No rash noted. He is not diaphoretic.   Psychiatric: He has a normal mood and affect. His behavior is normal.   Nursing note and vitals reviewed.      Assessment:      ICD-10-CM    1. Psoriasis L40.9    2. Essential hypertension I10 lisinopril (PRINIVIL/ZESTRIL) 20 MG tablet     metoprolol succinate (TOPROL-XL) 50 MG 24 hr tablet   3. Hyperlipidemia, unspecified hyperlipidemia type E78.5    4. Encounter for immunization Z23 GH IMM-  PNEUMOCOCCAL CONJ VACCINE 13 VALENT IM (Prevnar)   5. Hyperglycemia R73.9 Hemoglobin A1c        Plan: Overall he appears to be doing well.  His lab today showed an elevation in his glucose with an A1c at 6.3%.  Strongly encouraged him to work on healthy diet and weight reduction.  Medications will continue without change.  Prevnar given today.  We will need to check him again in 6 months for lab for Humira therapy as well as an A1c, he will therefore need a comp panel, CBC and A1c at that time.  Follow-up sooner for problems.

## 2018-07-20 ASSESSMENT — PATIENT HEALTH QUESTIONNAIRE - PHQ9: SUM OF ALL RESPONSES TO PHQ QUESTIONS 1-9: 0

## 2018-07-23 NOTE — PROGRESS NOTES
Patient Information     Patient Name  Cecilio Nolan MRN  [id number] Sex  Male   1955      Letter by Benjamin Linda MD at      Author:  Benjamin Linda MD Service:  (none) Author Type:  (none)    Filed:   Encounter Date:  2017 Status:  (Other)           Cecilio Nolan  [address] [place] [id number]          2017    Dear Mr. Nolan:    A refill of  metoprolol succinate (TOPROL XL) 50 mg sustained-release tablet has been called into your pharmacy.    Additional refills require an office visit with Benjamin Linda MD for annual medication management.   Please call the clinic at [phone] to schedule your appointment.    If you should require additional refills before your scheduled appointment, please contact your pharmacy and we will refill your medication until that date.      Thank you,    The Refill Nurse  [facility]

## 2018-07-23 NOTE — PROGRESS NOTES
Patient Information     Patient Name  Cecilio Nolan MRN  [id number] Sex  Male   1955      Letter by Benjamin Linda MD at      Author:  Benjamin Linda MD Service:  (none) Author Type:  (none)    Filed:   Encounter Date:  2017 Status:  (Other)           Cecilio Nolan  [address]  [facility] [address]          May 4, 2017    Dear Bartolo,    Following are the tests completed during your last clinic visit:    Results for orders placed or performed in visit on 17      COMPLETE METABOLIC PANEL      Result  Value Ref Range    SODIUM 139 133 - 143 mmol/L    POTASSIUM 4.1 3.5 - 5.1 mmol/L    CHLORIDE 104 98 - 107 mmol/L    CO2,TOTAL 26 21 - 31 mmol/L    ANION GAP 9 5 - 18                    GLUCOSE 99 70 - 105 mg/dL    CALCIUM 9.4 8.6 - 10.3 mg/dL    BUN 17 7 - 25 mg/dL    CREATININE 0.90 0.70 - 1.30 mg/dL    BUN/CREAT RATIO           19                    GFR if African American >60 >60 ml/min/1.73m2    GFR if not African American >60 >60 ml/min/1.73m2    ALBUMIN 4.2 3.5 - 5.7 g/dL    PROTEIN,TOTAL 7.6 6.4 - 8.9 g/dL    GLOBULIN                  3.4 2.0 - 3.7 g/dL    A/G RATIO 1.2 1.0 - 2.0                    BILIRUBIN,TOTAL 0.5 0.3 - 1.0 mg/dL    ALK PHOSPHATASE 122 (H) 34 - 104 IU/L    ALT (SGPT) 48 7 - 52 IU/L    AST (SGOT) 39 13 - 39 IU/L   LIPID PANEL      Result  Value Ref Range    CHOLESTEROL,TOTAL 184 <200 mg/dL    TRIGLYCERIDES 76 <150 mg/dL    HDL CHOLESTEROL 44 23 - 92 mg/dL    NON-HDL CHOLESTEROL 140 <145 mg/dl    CHOL/HDL RATIO            4.18 <4.50                    LDL CHOLESTEROL 125 (H) <100 mg/dL    PATIENT STATUS            FASTING                   PSA, TOTAL      Result  Value Ref Range    PSA TOTAL (DIAGNOSTIC) 0.312 <=3.100 ng/mL   CBC WITH AUTO DIFFERENTIAL      Result  Value Ref Range    WHITE BLOOD COUNT         7.7 4.5 - 11.0 thou/cu mm    RED BLOOD COUNT           5.55 4.30 - 5.90 mil/cu mm    HEMOGLOBIN                16.8 13.5 - 17.5 g/dL    HEMATOCRIT                52.4  37.0 - 53.0 %    MCV                       94 80 - 100 fL    MCH                       30.3 26.0 - 34.0 pg    MCHC                      32.1 32.0 - 36.0 g/dL    RDW                       12.0 11.5 - 15.5 %    PLATELET COUNT            232 140 - 440 thou/cu mm    MPV                       9.9 6.5 - 11.0 fL    NEUTROPHILS               65.8 42.0 - 72.0 %    LYMPHOCYTES               22.4 20.0 - 44.0 %    MONOCYTES                 9.5 <12.0 %    EOSINOPHILS               1.3 <8.0 %    BASOPHILS                 0.9 <3.0 %    ABSOLUTE NEUTROPHILS      5.1 1.7 - 7.0 thou/cu mm    ABSOLUTE LYMPHOCYTES      1.7 0.9 - 2.9 thou/cu mm    ABSOLUTE MONOCYTES        0.7 <0.9 thou/cu mm    ABSOLUTE EOSINOPHILS      0.1 <0.5 thou/cu mm    ABSOLUTE BASOPHILS        0.1 <0.3 thou/cu mm   URINALYSIS W REFLEX MICROSCOPIC IF POSITIVE      Result  Value Ref Range    COLOR                     Yellow Yellow Color    CLARITY                   Clear Clear Clarity    SPECIFIC GRAVITY,URINE    1.025 1.010, 1.015, 1.020, 1.025                    PH,URINE                  6.0 6.0, 7.0, 8.0, 5.5, 6.5, 7.5, 8.5                    UROBILINOGEN,QUALITATIVE  Normal Normal EU/dl    PROTEIN, URINE Negative Negative mg/dL    GLUCOSE, URINE Negative Negative mg/dL    KETONES,URINE             Negative Negative mg/dL    BILIRUBIN,URINE           Negative Negative                    OCCULT BLOOD,URINE        Negative Negative                    NITRITE                   Negative Negative                    LEUKOCYTE ESTERASE        Negative Negative                         Your blood and urine tests are normal. Congratulations on this report. If you have any questions about your results, feel free to contact me.    Sincerely,      Benjamin Linda MD  Internal Medicine  [facility]

## 2018-07-23 NOTE — PROGRESS NOTES
Patient Information     Patient Name  Cecilio Nolan MRN  [id number] Sex  Male   1955      Letter by Benjamin Linda MD at      Author:  Benjamin Linda MD Service:  (none) Author Type:  (none)    Filed:   Encounter Date:  2017 Status:  (Other)           Cecilio Nolan  [address]  [facility] [address]          2017    Dear Bartolo,    Following are the tests completed during your last clinic visit:    Results for orders placed or performed in visit on 17      AST (SGOT)      Result  Value Ref Range    AST (SGOT) 31 13 - 39 IU/L   CREATININE      Result  Value Ref Range    CREATININE 0.85 0.70 - 1.30 mg/dL    GFR if African American >60 >60 ml/min/1.73m2    GFR if not African American >60 >60 ml/min/1.73m2   ALBUMIN      Result  Value Ref Range    ALBUMIN 3.8 3.5 - 5.7 g/dL   CBC WITH AUTO DIFFERENTIAL      Result  Value Ref Range    WHITE BLOOD COUNT         6.4 4.5 - 11.0 thou/cu mm    RED BLOOD COUNT           5.24 4.30 - 5.90 mil/cu mm    HEMOGLOBIN                16.3 13.5 - 17.5 g/dL    HEMATOCRIT                48.4 37.0 - 53.0 %    MCV                       92 80 - 100 fL    MCH                       31.1 26.0 - 34.0 pg    MCHC                      33.7 32.0 - 36.0 g/dL    RDW                       12.4 11.5 - 15.5 %    PLATELET COUNT            193 140 - 440 thou/cu mm    MPV                       11.0 6.5 - 11.0 fL    NEUTROPHILS               63.8 42.0 - 72.0 %    LYMPHOCYTES               26.3 20.0 - 44.0 %    MONOCYTES                 8.2 <12.0 %    EOSINOPHILS               0.9 <8.0 %    BASOPHILS                 0.3 <3.0 %    IMMATURE GRANULOCYTES(METAS,MYELOS,PROS) 0.5 %    ABSOLUTE NEUTROPHILS      4.1 1.7 - 7.0 thou/cu mm    ABSOLUTE LYMPHOCYTES      1.7 0.9 - 2.9 thou/cu mm    ABSOLUTE MONOCYTES        0.5 <0.9 thou/cu mm    ABSOLUTE EOSINOPHILS      0.1 <0.5 thou/cu mm    ABSOLUTE BASOPHILS        0.0 <0.3 thou/cu mm    ABSOLUTE IMMATURE  GRANULOCYTES(METAS,MYELOS,PROS) 0.0 <=0.3 thou/cu mm         Your blood tests look fine. Congratulations on this report. We will monitor these blood tests every 4 months.    Sincerely,      Benjamin Linda MD  Internal Medicine  [facility]

## 2018-07-23 NOTE — PROGRESS NOTES
Patient Information     Patient Name  Cecilio Nolan MRN  [id number] Sex  Male   1955      Letter by Benjamin Linda MD at      Author:  Benjamin Linda MD Service:  (none) Author Type:  (none)    Filed:   Encounter Date:  2017 Status:  (Other)           Cecilio Nolan  [address]  [facility] [address]          2017    Dear Bartolo,    Your insurance company requires you to try Humira for your psoriasis prior to using any other treatment. This is the reason that they did not give you the prescription for Taltz. I think this is a reasonable alternative to try. It is a subcutaneous injection every 2 weeks and this was sent to your pharmacy. I want you to start on this and plan to see me after you have been on it for 3 months. If you have any questions in the interim, feel free to contact me.    Sincerely,      Benjamin Linda MD  Internal Medicine  [facility]

## 2018-07-24 NOTE — PROGRESS NOTES
Patient Information     Patient Name  Cecilio Nolan MRN  [id number] Sex  Male   1955      Letter by Benjamin Linda MD at      Author:  Benjamin Linda MD Service:  (none) Author Type:  (none)    Filed:   Encounter Date:  2017 Status:  (Other)           Cecilio Nolan  [address] [place] [id number]          2017    Dear Mr. Nolan:    A refill of  lisinopril (PRINIVIL; ZESTRIL) 20 mg tablet has been called into your pharmacy.    Additional refills require an appointment with Benjamin Linda MD and labs.   Please call the clinic at [phone] to schedule your appointment.    Thank you,    The Refill Nurse  [facility]

## 2019-03-25 ENCOUNTER — OFFICE VISIT (OUTPATIENT)
Dept: FAMILY MEDICINE | Facility: OTHER | Age: 64
End: 2019-03-25
Attending: PHYSICIAN ASSISTANT
Payer: COMMERCIAL

## 2019-03-25 VITALS
RESPIRATION RATE: 18 BRPM | TEMPERATURE: 96.2 F | WEIGHT: 212 LBS | HEART RATE: 88 BPM | DIASTOLIC BLOOD PRESSURE: 84 MMHG | BODY MASS INDEX: 35.28 KG/M2 | SYSTOLIC BLOOD PRESSURE: 138 MMHG

## 2019-03-25 DIAGNOSIS — S46.812A TRAPEZIUS STRAIN, LEFT, INITIAL ENCOUNTER: Primary | ICD-10-CM

## 2019-03-25 PROCEDURE — 99214 OFFICE O/P EST MOD 30 MIN: CPT | Performed by: NURSE PRACTITIONER

## 2019-03-25 ASSESSMENT — PAIN SCALES - GENERAL: PAINLEVEL: SEVERE PAIN (6)

## 2019-03-25 NOTE — PROGRESS NOTES
Nursing Notes:   Ariella Valencia CMA  3/25/2019 10:19 AM  Sign at exiting of workspace  Patient presents to the clinic for left shoulder pain x 1 week. Patient reports no known injury and has taken aspirin for treatment.  Medication Reconciliation: complete    Ariella Valencia CMA        SUBJECTIVE:   Cecilio Nolan is a 64 year old male who presents to clinic today for the following health issues:    Musculoskeletal problem/pain      Duration: 1 week    Description  Location: LT shoulder, He is RT hand dominant.     Intensity: 5 -10/10    Accompanying signs and symptoms: radiation of pain to Mid arm and goes across to the trapezius     History  Previous similar problem: no   Previous evaluation:  none    Precipitating or alleviating factors:  Trauma or overuse: No trauma- But Shoveling snow lately and active with snow shoes and winter activity   Aggravating factors include: lifting, exercise, overuse and Mild stiffness in the AM.     Therapies tried and outcome: rest/inactivity and NSAID - ASA      Problem list and histories reviewed & adjusted, as indicated.  Additional history: as documented    Patient Active Problem List   Diagnosis     Hyperlipidemia     Hypertension     Nonspecific abnormal results of liver function study     Psoriasis     Obesity     Past Surgical History:   Procedure Laterality Date     COLONOSCOPY  2012, ,WNL Q10yrs.     HERNIA REPAIR Right 2009     LIVER BIOPSY         Social History     Tobacco Use     Smoking status: Former Smoker     Last attempt to quit: 1/15/2003     Years since quittin.2     Smokeless tobacco: Never Used   Substance Use Topics     Alcohol use: Yes     Alcohol/week: 1.2 oz     Comment: Occasional     Family History   Problem Relation Age of Onset     Diabetes Father      Other - See Comments Father         adenomatous colonic polyps     Hyperlipidemia Father      Heart Disease Father         CHF     Arthritis Mother         Psoriatic  arthritis     Heart Disease Brother         CABG     Heart Disease Brother         CABG     Heart Disease Brother         MI  @60     Other - See Comments Brother         Suicide     Heart Disease Brother         CABG     Family History Negative Sister         Good Health         Current Outpatient Medications   Medication Sig Dispense Refill     aspirin 81 MG tablet Take 81 mg by mouth daily with food       HUMIRA PEN 40 MG/0.8ML pen kit INJECT THE CONTENTS OF 1 PEN (40 MG) UNDER THE SKIN EVERY OTHER WEEK 1 each 10     lisinopril (PRINIVIL/ZESTRIL) 20 MG tablet Take 1 tablet (20 mg) by mouth daily 90 tablet 3     metoprolol succinate (TOPROL-XL) 50 MG 24 hr tablet Take 1 tablet (50 mg) by mouth daily 90 tablet 3     Multiple Vitamin (MULTI-VITAMINS) TABS Take 1 tablet by mouth daily       triamcinolone (KENALOG) 0.1 % cream Apply topically 3 times daily       No Known Allergies      ROS:  Notable findings in the HPI.       OBJECTIVE:     /84 (BP Location: Right arm, Patient Position: Sitting, Cuff Size: Adult Regular)   Pulse 88   Temp 96.2  F (35.7  C) (Tympanic)   Resp 18   Wt 96.2 kg (212 lb)   BMI 35.28 kg/m    Body mass index is 35.28 kg/m .  GENERAL: healthy, alert and no distress  EYES: Eyes grossly normal to inspection  HENT: normal cephalic/atraumatic and oral mucous membranes moist  NECK: no adenopathy  RESP: without increased work of breathing.   CV: regular rates and rhythm and no peripheral edema  MS: neck exam shows normal strength, no torticollis and ROM is normal and Shoulder exam - left normal; full range of motion, mild pain on motion, no deformity noted. Empty can test negative. Drop test negative, Tender over trapezius muscle LT side, Tender over posterior shoulder.   SKIN: no suspicious lesions or rashes  NEURO: Normal strength and tone, mentation intact and speech normal  PSYCH: mentation appears normal, affect normal/bright    Diagnostic Test Results:  none   He declines an  xray at this point.     ASSESSMENT/PLAN:     1. Trapezius strain, left, initial encounter      PLAN:    MS Injury/Pain  ice, heat, elevate, rest, stretching, Tylenol and Ibuprofen  Could try chiropractor, if that is not helpful can try PT.      Followup:    If not improving or if condition worsens, follow up with your Primary Care Provider    I explained my diagnostic considerations and recommendations to the patient, who voiced understanding and agreement with the treatment plan. All questions were answered. We discussed potential side effects of any prescribed or recommended therapies, as well as expectations for response to treatments. He was advised to contact our office if there is no improvement or worsening of conditions or symptoms.  If s/s worsen or persist, patient will either come back or follow up with PCP.    Disclaimer:  This note consists of words and symbols derived from keyboarding, dictation, or using voice recognition software. As a result, there may be errors in the script that have gone undetected. Please consider this when interpreting information found in this note.      Paulina Morrell NP, 3/25/2019 10:25 AM

## 2019-03-25 NOTE — NURSING NOTE
Patient presents to the clinic for left shoulder pain x 1 week. Patient reports no known injury and has taken aspirin for treatment.  Medication Reconciliation: complete    Ariella Valencia, CMA

## 2019-03-25 NOTE — PATIENT INSTRUCTIONS
Patient Education     Shoulder Sprain  A sprain is a stretching or tearing of the ligaments that hold a joint together. A sprain may take up to 8 weeks to fully heal, depending on how severe it is. Moderate to severe shoulder sprains are treated with a sling or shoulder immobilizer. Minor sprains can be treated without any special support.  Home care  The following guidelines will help you care for your injury at home:    If a sling was given to you, leave it in place for the time advised by your healthcare provider. If you aren t sure how long to wear it, ask for advice. If the sling becomes loose, adjust it so that your forearm is level with the ground. Your shoulder should feel well supported.    Put an ice pack on the injured area for 20 minutes every 1 to 2 hours the first day. You can make your own ice pack by putting ice cubes in a plastic bag. A bag of frozen peas or something similar works well too. Wrap the bag in a thin towel. Continue with ice packs 3 to 4 times a day for the next 2 to 3 days. Then use the pack as needed to ease pain and swelling.    You may use acetaminophen or ibuprofen to control pain, unless another pain medicine was prescribed. If you have chronic liver or kidney disease, talk with your healthcare provider before using these medicines. Also talk with your provider if you ve had a stomach ulcer or gastrointestinal bleeding.    Shoulder joints become stiff if left in a sling for too long. You should start range of motion exercises about 7 to 10 days after the injury. Talk with your provider to find out what type of exercises to do and how soon to start.  Follow-up care  Follow up with your healthcare provider, or as advised.  Any X-rays you had today don t show any broken bones, breaks, or fractures. Sometimes fractures don t show up on the first X-ray. Bruises and sprains can sometimes hurt as much as a fracture. These injuries can take time to heal completely. If your symptoms don t  improve or they get worse, talk with your provider. You may need a repeat X-ray or other treatments.  When to seek medical advice  Call your healthcare provider right away if any of these occur:    Shoulder pain or swelling in your arm that gets worse    Fingers become cold, blue, numb, or tingly    Large amount of bruising of the shoulder or upper arm    Fever or chills  Date Last Reviewed: 8/1/2016 2000-2018 [employer]. [address] [place] [address], [place] [address]. All rights reserved. This information is not intended as a substitute for professional medical care. Always follow your healthcare professional's instructions.

## 2019-05-28 DIAGNOSIS — L40.9 PSORIASIS: ICD-10-CM

## 2019-05-28 NOTE — LETTER
May 29, 2019      Cecilio Reiskett     [facility] [id number]        Dear Mr. Nolan,    Your pharmacy has requested a refill of Humira pen.  A limited supply of this medication has been sent.      According to our records, you are due for a follow-up visit with Dr. Benjamin Linda.  He had requested a lab check in 6 months at your last office visit on 7/19/2018.    Your health is very important to us. Please contact our scheduling line at [phone] to set up this appointment at your earliest convenience, and before your next medication refills are needed.     Thank you for choosing [facility] for your health care needs.     Sincerely,        The Refill Nurse  [facility]

## 2019-05-29 NOTE — TELEPHONE ENCOUNTER
Routing refill request to provider for review/approval because:  Drug not on the [facility] refill protocol     Patient last seen 7-.  PCP had requested a 6 month follow-up for labs. Reminder letter sent to patient today. Heather Estrella RN on 5/29/2019 at 3:04 PM

## 2019-07-24 DIAGNOSIS — L40.9 PSORIASIS: ICD-10-CM

## 2019-07-26 NOTE — TELEPHONE ENCOUNTER
Informed patient due for annual review and labs and transferred to scheduling.    Darlene Pedro RN on 7/26/2019 at 4:29 PM

## 2019-07-29 ENCOUNTER — TELEPHONE (OUTPATIENT)
Dept: INTERNAL MEDICINE | Facility: OTHER | Age: 64
End: 2019-07-29

## 2019-07-29 DIAGNOSIS — L40.9 PSORIASIS: ICD-10-CM

## 2019-07-29 DIAGNOSIS — I10 ESSENTIAL HYPERTENSION: Primary | ICD-10-CM

## 2019-07-29 DIAGNOSIS — R73.9 HYPERGLYCEMIA: ICD-10-CM

## 2019-07-29 NOTE — TELEPHONE ENCOUNTER
Left a message for the patient letting him know lab orders have been placed and he just needs to make a lab only appointment.  Lisette Farooq LPN on 7/29/2019 at 11:19 AM

## 2019-08-05 ENCOUNTER — OFFICE VISIT (OUTPATIENT)
Dept: INTERNAL MEDICINE | Facility: OTHER | Age: 64
End: 2019-08-05
Attending: INTERNAL MEDICINE
Payer: COMMERCIAL

## 2019-08-05 VITALS
WEIGHT: 212.4 LBS | SYSTOLIC BLOOD PRESSURE: 128 MMHG | RESPIRATION RATE: 18 BRPM | HEART RATE: 76 BPM | HEIGHT: 67 IN | TEMPERATURE: 97.2 F | BODY MASS INDEX: 33.34 KG/M2 | DIASTOLIC BLOOD PRESSURE: 86 MMHG

## 2019-08-05 DIAGNOSIS — Z87.891 HISTORY OF SMOKING: ICD-10-CM

## 2019-08-05 DIAGNOSIS — L40.9 PSORIASIS: ICD-10-CM

## 2019-08-05 DIAGNOSIS — I10 ESSENTIAL HYPERTENSION: ICD-10-CM

## 2019-08-05 DIAGNOSIS — R73.9 HYPERGLYCEMIA: ICD-10-CM

## 2019-08-05 DIAGNOSIS — Z13.6 SCREENING FOR AAA (ABDOMINAL AORTIC ANEURYSM): ICD-10-CM

## 2019-08-05 DIAGNOSIS — E78.5 HYPERLIPIDEMIA, UNSPECIFIED HYPERLIPIDEMIA TYPE: Primary | ICD-10-CM

## 2019-08-05 LAB
ALBUMIN SERPL-MCNC: 3.9 G/DL (ref 3.5–5.7)
ALP SERPL-CCNC: 95 U/L (ref 34–104)
ALT SERPL W P-5'-P-CCNC: 47 U/L (ref 7–52)
ANION GAP SERPL CALCULATED.3IONS-SCNC: 6 MMOL/L (ref 3–14)
AST SERPL W P-5'-P-CCNC: 27 U/L (ref 13–39)
BILIRUB SERPL-MCNC: 0.4 MG/DL (ref 0.3–1)
BUN SERPL-MCNC: 18 MG/DL (ref 7–25)
CALCIUM SERPL-MCNC: 9 MG/DL (ref 8.6–10.3)
CHLORIDE SERPL-SCNC: 107 MMOL/L (ref 98–107)
CHOLEST SERPL-MCNC: 169 MG/DL
CO2 SERPL-SCNC: 24 MMOL/L (ref 21–31)
CREAT SERPL-MCNC: 0.87 MG/DL (ref 0.7–1.3)
ERYTHROCYTE [DISTWIDTH] IN BLOOD BY AUTOMATED COUNT: 12.4 % (ref 10–15)
GFR SERPL CREATININE-BSD FRML MDRD: 88 ML/MIN/{1.73_M2}
GLUCOSE SERPL-MCNC: 109 MG/DL (ref 70–105)
HBA1C MFR BLD: 6.1 % (ref 4–6)
HCT VFR BLD AUTO: 46.2 % (ref 40–53)
HDLC SERPL-MCNC: 39 MG/DL (ref 23–92)
HGB BLD-MCNC: 15 G/DL (ref 13.3–17.7)
LDLC SERPL CALC-MCNC: 107 MG/DL
MCH RBC QN AUTO: 30.7 PG (ref 26.5–33)
MCHC RBC AUTO-ENTMCNC: 32.5 G/DL (ref 31.5–36.5)
MCV RBC AUTO: 95 FL (ref 78–100)
NONHDLC SERPL-MCNC: 130 MG/DL
PLATELET # BLD AUTO: 183 10E9/L (ref 150–450)
POTASSIUM SERPL-SCNC: 4.3 MMOL/L (ref 3.5–5.1)
PROT SERPL-MCNC: 6.4 G/DL (ref 6.4–8.9)
RBC # BLD AUTO: 4.89 10E12/L (ref 4.4–5.9)
SODIUM SERPL-SCNC: 137 MMOL/L (ref 134–144)
TRIGL SERPL-MCNC: 113 MG/DL
WBC # BLD AUTO: 7.1 10E9/L (ref 4–11)

## 2019-08-05 PROCEDURE — 80061 LIPID PANEL: CPT | Performed by: INTERNAL MEDICINE

## 2019-08-05 PROCEDURE — 85027 COMPLETE CBC AUTOMATED: CPT | Performed by: INTERNAL MEDICINE

## 2019-08-05 PROCEDURE — 99397 PER PM REEVAL EST PAT 65+ YR: CPT | Mod: GY | Performed by: INTERNAL MEDICINE

## 2019-08-05 PROCEDURE — 83036 HEMOGLOBIN GLYCOSYLATED A1C: CPT | Performed by: INTERNAL MEDICINE

## 2019-08-05 PROCEDURE — 90732 PPSV23 VACC 2 YRS+ SUBQ/IM: CPT | Performed by: INTERNAL MEDICINE

## 2019-08-05 PROCEDURE — 80053 COMPREHEN METABOLIC PANEL: CPT | Performed by: INTERNAL MEDICINE

## 2019-08-05 PROCEDURE — 36415 COLL VENOUS BLD VENIPUNCTURE: CPT | Performed by: INTERNAL MEDICINE

## 2019-08-05 PROCEDURE — 90471 IMMUNIZATION ADMIN: CPT | Performed by: INTERNAL MEDICINE

## 2019-08-05 RX ORDER — LISINOPRIL 20 MG/1
20 TABLET ORAL DAILY
Qty: 90 TABLET | Refills: 3 | Status: SHIPPED | OUTPATIENT
Start: 2019-08-05 | End: 2020-09-28

## 2019-08-05 RX ORDER — METOPROLOL SUCCINATE 50 MG/1
50 TABLET, EXTENDED RELEASE ORAL DAILY
Qty: 90 TABLET | Refills: 3 | Status: SHIPPED | OUTPATIENT
Start: 2019-08-05 | End: 2020-10-19

## 2019-08-05 SDOH — HEALTH STABILITY: MENTAL HEALTH: HOW OFTEN DO YOU HAVE A DRINK CONTAINING ALCOHOL?: 2-4 TIMES A MONTH

## 2019-08-05 SDOH — HEALTH STABILITY: MENTAL HEALTH: HOW MANY STANDARD DRINKS CONTAINING ALCOHOL DO YOU HAVE ON A TYPICAL DAY?: 1 OR 2

## 2019-08-05 ASSESSMENT — ENCOUNTER SYMPTOMS
VOMITING: 0
DIARRHEA: 0
NUMBNESS: 0
NECK PAIN: 0
JOINT SWELLING: 0
DIAPHORESIS: 0
APPETITE CHANGE: 0
HALLUCINATIONS: 0
CHILLS: 0
TROUBLE SWALLOWING: 0
VOICE CHANGE: 0
FLANK PAIN: 0
DIZZINESS: 0
BACK PAIN: 0
WHEEZING: 0
CONFUSION: 0
ADENOPATHY: 0
NECK STIFFNESS: 0
WOUND: 0
HEMATURIA: 0
UNEXPECTED WEIGHT CHANGE: 0
AGITATION: 0
SLEEP DISTURBANCE: 0
LIGHT-HEADEDNESS: 0
FREQUENCY: 0
DYSURIA: 0
FEVER: 0
PALPITATIONS: 0
TREMORS: 0
FATIGUE: 0
CHEST TIGHTNESS: 0
RHINORRHEA: 0
EYE REDNESS: 0
HEADACHES: 0
BLOOD IN STOOL: 0
NERVOUS/ANXIOUS: 0
ABDOMINAL PAIN: 0
DIFFICULTY URINATING: 0
SPEECH DIFFICULTY: 0
BRUISES/BLEEDS EASILY: 0
ABDOMINAL DISTENTION: 0
SINUS PAIN: 0
SHORTNESS OF BREATH: 0
SEIZURES: 0
EYE PAIN: 0
WEAKNESS: 0
SORE THROAT: 0
NAUSEA: 0
COUGH: 0
CONSTIPATION: 0
SINUS PRESSURE: 0

## 2019-08-05 ASSESSMENT — MIFFLIN-ST. JEOR: SCORE: 1699.13

## 2019-08-05 NOTE — NURSING NOTE
Immunization Documentation    Prior to Immunization administration, verified patients identity using patient's name and date of birth. Please see IMMUNIZATIONS  and order for additional information.  Patient / Parent instructed to remain in clinic for 15 minutes and report any adverse reaction to staff immediately.    Was entire vial of medication used? Yes  Vial/Syringe: Single dose vial    Lisette Farooq LPN  8/5/2019   4:25 PM

## 2019-08-05 NOTE — NURSING NOTE
"The patient is here today to be seen for a refill on his medication.  Lisette Farooq LPN on 8/5/2019 at 3:56 PM  Chief Complaint   Patient presents with     Recheck Medication       Initial /86 (BP Location: Right arm, Patient Position: Sitting, Cuff Size: Adult Regular)   Pulse 76   Temp 97.2  F (36.2  C) (Tympanic)   Resp 18   Ht 1.689 m (5' 6.5\")   Wt 96.3 kg (212 lb 6.4 oz)   BMI 33.77 kg/m   Estimated body mass index is 33.77 kg/m  as calculated from the following:    Height as of this encounter: 1.689 m (5' 6.5\").    Weight as of this encounter: 96.3 kg (212 lb 6.4 oz).  Medication Reconciliation: complete    Lisette Farooq LPN    "

## 2019-08-05 NOTE — PROGRESS NOTES
Chief Complaint:  This patient is here for a comprehensive review of their multiple medical problems, renewal of medications and update on necessary health maintenance issues.      HPI: He is here today for complete evaluation and physical exam.  He has psoriasis.  He has been maintained on Humira therapy 40 mg every 2 weeks.  This has provided excellent response and he has essentially clear in regards to his psoriasis.  He is quite pleased with the results.  He has had no side effects from the Humira therapy.    The patient also has hypertension.  He is on 2 drug therapy for control of his hypertension.  He has some mild hyperlipidemia which is currently not treated.    He tells me that during deer season last year he had some carpal spasm of his hands.  He has not really had any trouble since then but it was quite concerning at that time.  We talked about that for a while today and decided not to do anything further unless he got to be more problematic.    Medications are reconciled.  Past medical history, past surgical history, family history and social histories are reviewed and updated.  He is due for a pneumococcal vaccine.  Other immunizations are up-to-date.  Repeat colonoscopy will be due in 3 years.    Past Medical History:   Diagnosis Date     Essential (primary) hypertension     No Comments Provided     Hyperlipidemia     No Comments Provided     Psoriasis     No Comments Provided     Transaminitis     negative liver biopsy       Past Surgical History:   Procedure Laterality Date     COLONOSCOPY  08/22/2012    WNL Q10yrs.     HERNIA REPAIR Right 2009     LIVER BIOPSY  1995       Current Outpatient Medications   Medication Sig Dispense Refill     adalimumab (HUMIRA PEN) 40 MG/0.8ML pen kit Inject 0.8 mLs (40 mg) Subcutaneous every 14 days 1 each 11     aspirin 81 MG tablet Take 81 mg by mouth daily with food       lisinopril (PRINIVIL/ZESTRIL) 20 MG tablet Take 1 tablet (20 mg) by mouth daily 90 tablet 3      metoprolol succinate ER (TOPROL-XL) 50 MG 24 hr tablet Take 1 tablet (50 mg) by mouth daily 90 tablet 3     Multiple Vitamin (MULTI-VITAMINS) TABS Take 1 tablet by mouth daily         No Known Allergies    Family History   Problem Relation Age of Onset     Diabetes Father      Other - See Comments Father         adenomatous colonic polyps     Hyperlipidemia Father      Heart Disease Father         CHF     Arthritis Mother         Psoriatic arthritis     Heart Disease Brother         CABG     Heart Disease Brother         CABG     Heart Disease Brother         MI  @60     Other - See Comments Brother         Suicide     Heart Disease Brother         CABG     Family History Negative Sister         Good Health       Social History     Socioeconomic History     Marital status: Single     Spouse name: Not on file     Number of children: Not on file     Years of education: Not on file     Highest education level: Not on file   Occupational History     Not on file   Social Needs     Financial resource strain: Not on file     Food insecurity:     Worry: Not on file     Inability: Not on file     Transportation needs:     Medical: Not on file     Non-medical: Not on file   Tobacco Use     Smoking status: Former Smoker     Last attempt to quit: 1/15/2003     Years since quittin.5     Smokeless tobacco: Never Used   Substance and Sexual Activity     Alcohol use: Yes     Alcohol/week: 1.2 oz     Frequency: 2-4 times a month     Drinks per session: 1 or 2     Comment: Occasional     Drug use: No     Comment: Drug use: NoMarijuana occasionally     Sexual activity: Never   Lifestyle     Physical activity:     Days per week: Not on file     Minutes per session: Not on file     Stress: Not on file   Relationships     Social connections:     Talks on phone: Not on file     Gets together: Not on file     Attends Sikh service: Not on file     Active member of club or organization: Not on file     Attends meetings of  clubs or organizations: Not on file     Relationship status: Not on file     Intimate partner violence:     Fear of current or ex partner: Not on file     Emotionally abused: Not on file     Physically abused: Not on file     Forced sexual activity: Not on file   Other Topics Concern     Parent/sibling w/ CABG, MI or angioplasty before 65F 55M? Not Asked   Social History Narrative    He is single, works at [employer].  He lives in [place].       Review of Systems   Constitutional: Negative for appetite change, chills, diaphoresis, fatigue, fever and unexpected weight change.   HENT: Negative for ear pain, rhinorrhea, sinus pressure, sinus pain, sore throat, trouble swallowing and voice change.    Eyes: Negative for pain, redness and visual disturbance.   Respiratory: Negative for cough, chest tightness, shortness of breath and wheezing.    Cardiovascular: Negative for chest pain, palpitations and leg swelling.   Gastrointestinal: Negative for abdominal distention, abdominal pain, blood in stool, constipation, diarrhea, nausea and vomiting.   Endocrine: Negative for cold intolerance and heat intolerance.   Genitourinary: Negative for difficulty urinating, dysuria, flank pain, frequency and hematuria.   Musculoskeletal: Negative for back pain, joint swelling, neck pain and neck stiffness.   Skin: Negative for rash and wound.   Allergic/Immunologic: Negative for immunocompromised state.   Neurological: Negative for dizziness, tremors, seizures, syncope, speech difficulty, weakness, light-headedness, numbness and headaches.   Hematological: Negative for adenopathy. Does not bruise/bleed easily.   Psychiatric/Behavioral: Negative for agitation, behavioral problems, confusion, hallucinations and sleep disturbance. The patient is not nervous/anxious.        Physical Exam   Constitutional: He is oriented to person, place, and time. He appears well-developed and well-nourished. No distress.   HENT:   Head: Normocephalic.    Right Ear: External ear normal.   Left Ear: External ear normal.   Nose: Nose normal.   Mouth/Throat: Oropharynx is clear and moist. No oropharyngeal exudate.   Eyes: Pupils are equal, round, and reactive to light. Conjunctivae are normal.   Neck: Normal range of motion. Neck supple. Normal carotid pulses and no JVD present. Carotid bruit is not present. No tracheal deviation present. No thyromegaly present.   Cardiovascular: Normal rate, regular rhythm and normal heart sounds. Exam reveals no gallop and no friction rub.   No murmur heard.  Pulmonary/Chest: Effort normal and breath sounds normal. No stridor. No respiratory distress. He has no wheezes. He has no rales.   Abdominal: Soft. Bowel sounds are normal. He exhibits no distension and no mass. There is no tenderness. There is no rebound and no guarding. No hernia.   Genitourinary: Rectum normal, prostate normal and penis normal.   Musculoskeletal: Normal range of motion. He exhibits no edema.   Lymphadenopathy:     He has no cervical adenopathy.   Neurological: He is alert and oriented to person, place, and time. He displays normal reflexes. No cranial nerve deficit or sensory deficit. He exhibits normal muscle tone. Coordination normal.   Skin: Skin is warm and dry. No rash noted. He is not diaphoretic.   Psychiatric: He has a normal mood and affect.   Nursing note and vitals reviewed.      Assessment:      ICD-10-CM    1. Hyperlipidemia, unspecified hyperlipidemia type E78.5    2. Psoriasis L40.9 adalimumab (HUMIRA PEN) 40 MG/0.8ML pen kit   3. Essential hypertension I10 lisinopril (PRINIVIL/ZESTRIL) 20 MG tablet     metoprolol succinate ER (TOPROL-XL) 50 MG 24 hr tablet   4. Screening for AAA (abdominal aortic aneurysm) Z13.6 Abdominal Aortic Aneurysm Screening/Tracking     US Abdominal Aorta Limited   5. History of smoking Z87.891 US Abdominal Aorta Limited   6. Hyperglycemia R73.9         Plan: Overall he appears to be doing well.  His exam is fairly  unremarkable although his weight is slowly drifting up.  We did talk about that today and I encouraged him to watch his weight and especially watch his carbohydrates with his hyperglycemia.  Reviewed his lab with him today, everything looked fine.  Glucose and A1c levels were minimally elevated.  Medications will continue without change.  Pneumovax given.  Ultrasound of the aorta for screening for aneurysm is scheduled.  Assuming all goes well, he will follow-up with me on an annual basis or anytime sooner if there are problems.

## 2019-08-16 ENCOUNTER — HOSPITAL ENCOUNTER (OUTPATIENT)
Dept: ULTRASOUND IMAGING | Facility: OTHER | Age: 64
Discharge: HOME | End: 2019-08-16
Attending: INTERNAL MEDICINE | Admitting: INTERNAL MEDICINE
Payer: COMMERCIAL

## 2019-08-16 DIAGNOSIS — Z13.6 SCREENING FOR AAA (ABDOMINAL AORTIC ANEURYSM): ICD-10-CM

## 2019-08-16 DIAGNOSIS — Z87.891 HISTORY OF SMOKING: ICD-10-CM

## 2019-08-16 PROCEDURE — 76775 US EXAM ABDO BACK WALL LIM: CPT

## 2019-09-25 ENCOUNTER — TELEPHONE (OUTPATIENT)
Dept: INTERNAL MEDICINE | Facility: OTHER | Age: 64
End: 2019-09-25

## 2019-09-27 NOTE — TELEPHONE ENCOUNTER
Contacted the patient and gave him the results of his ultrasound.  Lisette Farooq LPN on 9/27/2019 at 9:16 AM

## 2020-03-31 ENCOUNTER — TELEPHONE (OUTPATIENT)
Dept: INTERNAL MEDICINE | Facility: OTHER | Age: 65
End: 2020-03-31

## 2020-03-31 NOTE — TELEPHONE ENCOUNTER
[facility] regards to application for Humira form filled out by Dr Linda, but patient needs to fill out his portion of paperwork. Will leave on Dr Burks nurse desk.  Talya Dobbins LPN ....................3/31/2020   3:04 PM

## 2020-04-02 ENCOUNTER — TELEPHONE (OUTPATIENT)
Dept: INTERNAL MEDICINE | Facility: OTHER | Age: 65
End: 2020-04-02

## 2020-06-20 ENCOUNTER — OFFICE VISIT (OUTPATIENT)
Dept: FAMILY MEDICINE | Facility: OTHER | Age: 65
End: 2020-06-20
Attending: PHYSICIAN ASSISTANT
Payer: COMMERCIAL

## 2020-06-20 VITALS
RESPIRATION RATE: 18 BRPM | BODY MASS INDEX: 34.66 KG/M2 | DIASTOLIC BLOOD PRESSURE: 80 MMHG | SYSTOLIC BLOOD PRESSURE: 122 MMHG | HEART RATE: 80 BPM | WEIGHT: 218 LBS | TEMPERATURE: 96.8 F
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
## 2020-06-20 PROCEDURE — 99213 OFFICE O/P EST LOW 20 MIN: CPT | Performed by: FAMILY MEDICINE

## 2020-06-20 PROCEDURE — G0463 HOSPITAL OUTPT CLINIC VISIT: HCPCS

## 2020-06-20 RX ORDER — DOXYCYCLINE HYCLATE 100 MG
100 TABLET ORAL 2 TIMES DAILY
Qty: 28 TABLET | Refills: 0 | Status: SHIPPED | OUTPATIENT
Start: 2020-06-20 | End: 2020-06-20

## 2020-06-20 RX ORDER — DOXYCYCLINE HYCLATE 100 MG
100 TABLET ORAL 2 TIMES DAILY
Qty: 28 TABLET | Refills: 0 | Status: SHIPPED | OUTPATIENT
Start: 2020-06-20 | End: 2020-07-08

## 2020-06-20 ASSESSMENT — PAIN SCALES - GENERAL: PAINLEVEL: MILD PAIN (2)

## 2020-06-20 NOTE — PROGRESS NOTES
"Nursing Notes:   Karen Quezada LPN  6/20/2020 10:11 AM  Signed  Chief Complaint   Patient presents with     Insect Bites   Pt present to clinic today for possible bug bite on his right shin.    Initial /80 (BP Location: Right arm, Patient Position: Sitting, Cuff Size: Adult Regular)   Pulse 80   Temp 96.8  F (36  C) (Tympanic)   Resp 18   Wt 98.9 kg (218 lb)   BMI 34.66 kg/m   Estimated body mass index is 34.66 kg/m  as calculated from the following:    Height as of 8/5/19: 1.689 m (5' 6.5\").    Weight as of this encounter: 98.9 kg (218 lb).  Medication Reconciliation: complete    Karen Quezada LPN    SUBJECTIVE:   Cecilio Nolan is a 65 year old male who presents to clinic today for the following health issues:    HPI  Cecilio is here with a bite to the lower part of his R distal shin.  Uncertain what bit him; did not pull a tick off.  Had someone else look at it and told him \"something is still in the center.\"  Some redness and tenderness.  No drainage.  Has had going on for the last couple of days.  No medications have been tried.    No fever/chills.    Patient Active Problem List    Diagnosis Date Noted     Hyperglycemia 08/05/2019     Priority: Medium     Hyperlipidemia 02/01/2018     Priority: Medium     Hypertension 02/01/2018     Priority: Medium     Nonspecific abnormal results of liver function study 02/01/2018     Priority: Medium     Overview:   elevated, Negative biopsy.       Psoriasis 02/01/2018     Priority: Medium     Obesity 02/01/2018     Priority: Medium     Overview:   BMI: 34       Past Medical History:   Diagnosis Date     Essential (primary) hypertension     No Comments Provided     Hyperlipidemia     No Comments Provided     Psoriasis     No Comments Provided     Transaminitis     negative liver biopsy      Past Surgical History:   Procedure Laterality Date     COLONOSCOPY  08/22/2010    WNL Q10yrs.     HERNIA REPAIR Right 2009     LIVER BIOPSY  1995     Family History "   Problem Relation Age of Onset     Diabetes Father      Other - See Comments Father         adenomatous colonic polyps     Hyperlipidemia Father      Heart Disease Father         CHF     Arthritis Mother         Psoriatic arthritis     Heart Disease Brother         CABG     Heart Disease Brother         CABG     Heart Disease Brother         MI  @60     Other - See Comments Brother         Suicide     Heart Disease Brother         CABG     Family History Negative Sister         Good Health     Social History     Tobacco Use     Smoking status: Former Smoker     Last attempt to quit: 1/15/2003     Years since quittin.4     Smokeless tobacco: Never Used   Substance Use Topics     Alcohol use: Yes     Alcohol/week: 2.0 standard drinks     Frequency: 2-4 times a month     Drinks per session: 1 or 2     Comment: Occasional     Social History     Social History Narrative    He is single, works at [employer].  He lives in [place].     Current Outpatient Medications   Medication Sig Dispense Refill     aspirin 81 MG tablet Take 81 mg by mouth daily with food       doxycycline hyclate (VIBRA-TABS) 100 MG tablet Take 1 tablet (100 mg) by mouth 2 times daily for 14 days 28 tablet 0     lisinopril (PRINIVIL/ZESTRIL) 20 MG tablet Take 1 tablet (20 mg) by mouth daily 90 tablet 3     metoprolol succinate ER (TOPROL-XL) 50 MG 24 hr tablet Take 1 tablet (50 mg) by mouth daily 90 tablet 3     Multiple Vitamin (MULTI-VITAMINS) TABS Take 1 tablet by mouth daily       adalimumab (HUMIRA PEN) 40 MG/0.8ML pen kit Inject 0.8 mLs (40 mg) Subcutaneous every 14 days 1 each 11     No Known Allergies    Review of Systems   Constitutional: Negative for activity change, appetite change, fatigue and fever.   Respiratory: Negative for cough and chest tightness.    Cardiovascular: Negative for palpitations and peripheral edema.   All other systems reviewed and are negative.       OBJECTIVE:     /80 (BP Location: Right arm, Patient  Position: Sitting, Cuff Size: Adult Regular)   Pulse 80   Temp 96.8  F (36  C) (Tympanic)   Resp 18   Wt 98.9 kg (218 lb)   BMI 34.66 kg/m    Body mass index is 34.66 kg/m .  Physical Exam  Vitals signs and nursing note reviewed.   Constitutional:       Appearance: Normal appearance.   Musculoskeletal:        Legs:    Skin:     Comments: 4-5mm of erythema surrounding eschar/scab formation.   Neurological:      Mental Status: He is alert.     Diagnostic Test Results:  none     ASSESSMENT/PLAN:     1. Bug bite, initial encounter  Vs abrasion; but eschar formation similar to possible tick exposure.  Due to unknown nature will cover with doxycycline and monitor for improvement.  May require second abx if failure to improve.  - doxycycline hyclate (VIBRA-TABS) 100 MG tablet; Take 1 tablet (100 mg) by mouth 2 times daily  Dispense: 28 tablet; Refill: 0      Arlette James [facility]

## 2020-06-20 NOTE — PATIENT INSTRUCTIONS
Patient Education     Discharge Instructions for Cellulitis  You have been diagnosed with cellulitis. This is an infection in the deepest layer of the skin and tissue beneath the skin. In some cases, the infection also affects the muscle. Cellulitis is caused by bacteria. The bacteria can enter the body through broken skin. This can happen with a cut, scratch, animal bite, or an insect bite that has been scratched. You may have been treated in the hospital with antibiotics and fluids. You will likely be given a prescription for antibiotics to take at home. This sheet will help you take care of yourself at home.  Home care  When you are home:    Take the prescribed antibiotic medicine you are given as directed until it is gone. Take it even if you feel better. It treats the infection and stops it from returning. Not taking all the medicine can make future infections hard to treat.    Keep the infected area clean.    When possible, raise the infected area above the level of your heart. This helps keep swelling down.    Talk with your healthcare provider if you are in pain. Ask what kind of over-the-counter medicine you can take for pain.    Apply clean bandages as advised.    Take your temperature once a day for a week.    Wash your hands often to prevent spreading the infection.  In the future, wash your hands before and after you touch cuts, scratches, or bandages. This will help prevent infection.   When to call your healthcare provider  Call your healthcare provider right away if you have any of the following:    Trouble or pain when moving the joints above or below the infected area    Discharge or pus draining from the area    Fever of 100.4 F (38 C) or higher, or as directed by your healthcare provider    Pain that gets worse in or around the infected     Redness that gets worse in or around the infected area, particularly if the area of redness expands to a wider area    Shaking chills    Swelling of the  infected area    Vomiting  Date Last Reviewed: 8/1/2016 2000-2019 [employer]. [address] [place] [address], [place] [address]. All rights reserved. This information is not intended as a substitute for professional medical care. Always follow your healthcare professional's instructions.

## 2020-06-20 NOTE — NURSING NOTE
"Chief Complaint   Patient presents with     Insect Bites   Pt present to clinic today for possible bug bite on his right shin.    Initial /80 (BP Location: Right arm, Patient Position: Sitting, Cuff Size: Adult Regular)   Pulse 80   Temp 96.8  F (36  C) (Tympanic)   Resp 18   Wt 98.9 kg (218 lb)   BMI 34.66 kg/m   Estimated body mass index is 34.66 kg/m  as calculated from the following:    Height as of 8/5/19: 1.689 m (5' 6.5\").    Weight as of this encounter: 98.9 kg (218 lb).  Medication Reconciliation: complete    Karen Quezada LPN  "

## 2020-06-22 ASSESSMENT — ENCOUNTER SYMPTOMS
CHEST TIGHTNESS: 0
FATIGUE: 0
APPETITE CHANGE: 0
COUGH: 0
FEVER: 0
ACTIVITY CHANGE: 0
PALPITATIONS: 0

## 2020-06-29 ENCOUNTER — TELEPHONE (OUTPATIENT)
Dept: INTERNAL MEDICINE | Facility: OTHER | Age: 65
End: 2020-06-29

## 2020-06-29 DIAGNOSIS — R73.9 HYPERGLYCEMIA: ICD-10-CM

## 2020-06-29 DIAGNOSIS — L40.9 PSORIASIS: Primary | ICD-10-CM

## 2020-06-29 NOTE — TELEPHONE ENCOUNTER
Patient states they are the labs he always gets since he's on Humira.  A new batch is coming in soon.  Kaylan Cote LPN on 6/29/2020 at 3:00 PM

## 2020-06-29 NOTE — TELEPHONE ENCOUNTER
Boaz would like to do labs prior to his visit with you July 8th. Appt already scheduled. Thank you.  Kendal Lynn

## 2020-06-29 NOTE — TELEPHONE ENCOUNTER
After the patient's full name and date of birth was verified, the patient was notified of the below information.  Kaylan Cote LPN on 6/29/2020 at 3:29 PM

## 2020-07-08 ENCOUNTER — OFFICE VISIT (OUTPATIENT)
Dept: INTERNAL MEDICINE | Facility: OTHER | Age: 65
End: 2020-07-08
Attending: INTERNAL MEDICINE
Payer: COMMERCIAL

## 2020-07-08 VITALS
WEIGHT: 218 LBS | BODY MASS INDEX: 35.03 KG/M2 | DIASTOLIC BLOOD PRESSURE: 78 MMHG | HEIGHT: 66 IN | OXYGEN SATURATION: 96 % | HEART RATE: 88 BPM | SYSTOLIC BLOOD PRESSURE: 138 MMHG | RESPIRATION RATE: 20 BRPM | TEMPERATURE: 97.7 F

## 2020-07-08 DIAGNOSIS — Z12.11 SCREENING FOR COLON CANCER: ICD-10-CM

## 2020-07-08 DIAGNOSIS — R94.5 NONSPECIFIC ABNORMAL RESULTS OF LIVER FUNCTION STUDY: ICD-10-CM

## 2020-07-08 DIAGNOSIS — I10 ESSENTIAL HYPERTENSION: ICD-10-CM

## 2020-07-08 DIAGNOSIS — L40.9 PSORIASIS: Primary | ICD-10-CM

## 2020-07-08 DIAGNOSIS — L40.9 PSORIASIS: ICD-10-CM

## 2020-07-08 DIAGNOSIS — R73.9 HYPERGLYCEMIA: ICD-10-CM

## 2020-07-08 DIAGNOSIS — E78.5 HYPERLIPIDEMIA, UNSPECIFIED HYPERLIPIDEMIA TYPE: ICD-10-CM

## 2020-07-08 LAB
ALBUMIN SERPL-MCNC: 3.9 G/DL (ref 3.5–5.7)
ALP SERPL-CCNC: 93 U/L (ref 34–104)
ALT SERPL W P-5'-P-CCNC: 50 U/L (ref 7–52)
ANION GAP SERPL CALCULATED.3IONS-SCNC: 7 MMOL/L (ref 3–14)
AST SERPL W P-5'-P-CCNC: 43 U/L (ref 13–39)
BILIRUB SERPL-MCNC: 0.7 MG/DL (ref 0.3–1)
BUN SERPL-MCNC: 17 MG/DL (ref 7–25)
CALCIUM SERPL-MCNC: 8.9 MG/DL (ref 8.6–10.3)
CHLORIDE SERPL-SCNC: 107 MMOL/L (ref 98–107)
CO2 SERPL-SCNC: 25 MMOL/L (ref 21–31)
CREAT SERPL-MCNC: 0.92 MG/DL (ref 0.7–1.3)
ERYTHROCYTE [DISTWIDTH] IN BLOOD BY AUTOMATED COUNT: 12.5 % (ref 10–15)
GFR SERPL CREATININE-BSD FRML MDRD: 82 ML/MIN/{1.73_M2}
GLUCOSE SERPL-MCNC: 115 MG/DL (ref 70–105)
HBA1C MFR BLD: 6.3 % (ref 4–6)
HCT VFR BLD AUTO: 46.8 % (ref 40–53)
HGB BLD-MCNC: 15.4 G/DL (ref 13.3–17.7)
MCH RBC QN AUTO: 30.9 PG (ref 26.5–33)
MCHC RBC AUTO-ENTMCNC: 32.9 G/DL (ref 31.5–36.5)
MCV RBC AUTO: 94 FL (ref 78–100)
PLATELET # BLD AUTO: 194 10E9/L (ref 150–450)
POTASSIUM SERPL-SCNC: 4.1 MMOL/L (ref 3.5–5.1)
PROT SERPL-MCNC: 7.2 G/DL (ref 6.4–8.9)
RBC # BLD AUTO: 4.99 10E12/L (ref 4.4–5.9)
SODIUM SERPL-SCNC: 139 MMOL/L (ref 134–144)
WBC # BLD AUTO: 6.5 10E9/L (ref 4–11)

## 2020-07-08 PROCEDURE — G0463 HOSPITAL OUTPT CLINIC VISIT: HCPCS

## 2020-07-08 PROCEDURE — 80053 COMPREHEN METABOLIC PANEL: CPT | Mod: ZL | Performed by: INTERNAL MEDICINE

## 2020-07-08 PROCEDURE — 36415 COLL VENOUS BLD VENIPUNCTURE: CPT | Mod: ZL | Performed by: INTERNAL MEDICINE

## 2020-07-08 PROCEDURE — 99215 OFFICE O/P EST HI 40 MIN: CPT | Performed by: INTERNAL MEDICINE

## 2020-07-08 PROCEDURE — 85027 COMPLETE CBC AUTOMATED: CPT | Mod: ZL | Performed by: INTERNAL MEDICINE

## 2020-07-08 PROCEDURE — 83036 HEMOGLOBIN GLYCOSYLATED A1C: CPT | Mod: ZL | Performed by: INTERNAL MEDICINE

## 2020-07-08 ASSESSMENT — ENCOUNTER SYMPTOMS
SEIZURES: 0
WOUND: 0
NUMBNESS: 0
SLEEP DISTURBANCE: 0
SHORTNESS OF BREATH: 0
NECK STIFFNESS: 0
LIGHT-HEADEDNESS: 0
DIARRHEA: 0
EYE REDNESS: 0
RHINORRHEA: 0
HEMATURIA: 0
HEADACHES: 0
APPETITE CHANGE: 0
BACK PAIN: 0
EYE PAIN: 0
FREQUENCY: 0
JOINT SWELLING: 0
PALPITATIONS: 0
CHILLS: 0
VOMITING: 0
CHEST TIGHTNESS: 0
SINUS PAIN: 0
WEAKNESS: 0
DIFFICULTY URINATING: 0
NERVOUS/ANXIOUS: 0
SINUS PRESSURE: 0
FEVER: 0
CONSTIPATION: 0
TREMORS: 0
UNEXPECTED WEIGHT CHANGE: 0
DIZZINESS: 0
NECK PAIN: 0
ABDOMINAL DISTENTION: 0
ADENOPATHY: 0
VOICE CHANGE: 0
COUGH: 0
WHEEZING: 0
FATIGUE: 0
SPEECH DIFFICULTY: 0
CONFUSION: 0
TROUBLE SWALLOWING: 0
BLOOD IN STOOL: 0
DYSURIA: 0
NAUSEA: 0
SORE THROAT: 0
ABDOMINAL PAIN: 0
HALLUCINATIONS: 0
BRUISES/BLEEDS EASILY: 0
AGITATION: 0
FLANK PAIN: 0
DIAPHORESIS: 0

## 2020-07-08 ASSESSMENT — PAIN SCALES - GENERAL: PAINLEVEL: NO PAIN (0)

## 2020-07-08 ASSESSMENT — MIFFLIN-ST. JEOR: SCORE: 1704.46

## 2020-07-08 NOTE — NURSING NOTE
Cecilio Nolan is a 65 year old male presenting today for: medication review  Medication Reconciliation: complete    Kaylan Cote LPN 7/8/2020 2:49 PM

## 2020-07-08 NOTE — H&P (VIEW-ONLY)
Chief Complaint:  This patient is here for a comprehensive review of their multiple medical problems, renewal of medications and update on necessary health maintenance issues.      HPI: This patient is here today for complete evaluation and review of his chronic medical problems.  He has a history of psoriasis.  He is currently on therapy with Humira.  This is working very well to control his psoriasis and is not causing him any side effects whatsoever.    He also has a history of hypertension.  He is on multidrug therapy for control of his blood pressure and has good control.  He does not have any endorgan disease as a result of this.  He has a history of hyperlipidemia which is mild but he is currently not on a statin.  He has numerous cardiac risk factors but does not have any known vascular disease.    He has a history of hyperglycemia.  He is due for recheck on this.  Unfortunately, his weight is up 10 pounds over the last 2 years.  He is starting to work towards longterm and only is working 3 days a week so I encouraged him to start working more on diet and an exercise program.  He has a history of transaminitis with a negative liver biopsy in the past.  He needs a recheck on that today as well.    Other than that he has no complaints or concerns.  Medications are reconciled.  Past medical history, past surgical history, family history and social histories are reviewed and updated.  Immunizations are up-to-date, I did talk to him about potentially getting the Shingrix vaccine.  He is due for a colonoscopy.    Past Medical History:   Diagnosis Date     Essential (primary) hypertension     No Comments Provided     Hyperlipidemia     No Comments Provided     Psoriasis     No Comments Provided     Transaminitis     negative liver biopsy       Past Surgical History:   Procedure Laterality Date     COLONOSCOPY  08/22/2010    WNL Q10yrs.     HERNIA REPAIR Right 2009     LIVER BIOPSY  1995       Current Outpatient  Medications   Medication Sig Dispense Refill     adalimumab (HUMIRA PEN) 40 MG/0.8ML pen kit Inject 0.8 mLs (40 mg) Subcutaneous every 14 days 1 each 11     aspirin 81 MG tablet Take 81 mg by mouth daily with food       lisinopril (PRINIVIL/ZESTRIL) 20 MG tablet Take 1 tablet (20 mg) by mouth daily 90 tablet 3     metoprolol succinate ER (TOPROL-XL) 50 MG 24 hr tablet Take 1 tablet (50 mg) by mouth daily 90 tablet 3     Multiple Vitamin (MULTI-VITAMINS) TABS Take 1 tablet by mouth daily         No Known Allergies    Family History   Problem Relation Age of Onset     Diabetes Father      Other - See Comments Father         adenomatous colonic polyps     Hyperlipidemia Father      Heart Disease Father         CHF     Arthritis Mother         Psoriatic arthritis     Heart Disease Brother         CABG     Heart Disease Brother         CABG     Heart Disease Brother         MI  @60     Other - See Comments Brother         Suicide     Heart Disease Brother         CABG     Family History Negative Sister         Good Health       Social History     Socioeconomic History     Marital status: Single     Spouse name: Not on file     Number of children: Not on file     Years of education: Not on file     Highest education level: Not on file   Occupational History     Not on file   Social Needs     Financial resource strain: Not on file     Food insecurity     Worry: Not on file     Inability: Not on file     Transportation needs     Medical: Not on file     Non-medical: Not on file   Tobacco Use     Smoking status: Former Smoker     Last attempt to quit: 1/15/2003     Years since quittin.4     Smokeless tobacco: Never Used   Substance and Sexual Activity     Alcohol use: Yes     Alcohol/week: 2.0 standard drinks     Frequency: 2-4 times a month     Drinks per session: 1 or 2     Comment: Occasional     Drug use: Yes     Comment: Drug use: NoMarijuana occasionally     Sexual activity: Never   Lifestyle     Physical  activity     Days per week: Not on file     Minutes per session: Not on file     Stress: Not on file   Relationships     Social connections     Talks on phone: Not on file     Gets together: Not on file     Attends Restorationism service: Not on file     Active member of club or organization: Not on file     Attends meetings of clubs or organizations: Not on file     Relationship status: Not on file     Intimate partner violence     Fear of current or ex partner: Not on file     Emotionally abused: Not on file     Physically abused: Not on file     Forced sexual activity: Not on file   Other Topics Concern     Parent/sibling w/ CABG, MI or angioplasty before 65F 55M? Not Asked   Social History Narrative    He is single, works at [employer].  He lives in [place].       Review of Systems   Constitutional: Negative for appetite change, chills, diaphoresis, fatigue, fever and unexpected weight change.   HENT: Negative for ear pain, rhinorrhea, sinus pressure, sinus pain, sore throat, trouble swallowing and voice change.    Eyes: Negative for pain, redness and visual disturbance.   Respiratory: Negative for cough, chest tightness, shortness of breath and wheezing.    Cardiovascular: Negative for chest pain, palpitations and leg swelling.   Gastrointestinal: Negative for abdominal distention, abdominal pain, blood in stool, constipation, diarrhea, nausea and vomiting.   Endocrine: Negative for cold intolerance and heat intolerance.   Genitourinary: Negative for difficulty urinating, dysuria, flank pain, frequency and hematuria.   Musculoskeletal: Negative for back pain, joint swelling, neck pain and neck stiffness.   Skin: Negative for rash and wound.   Allergic/Immunologic: Negative for immunocompromised state.   Neurological: Negative for dizziness, tremors, seizures, syncope, speech difficulty, weakness, light-headedness, numbness and headaches.   Hematological: Negative for adenopathy. Does not bruise/bleed easily.    Psychiatric/Behavioral: Negative for agitation, behavioral problems, confusion, hallucinations and sleep disturbance. The patient is not nervous/anxious.        Physical Exam  Vitals signs and nursing note reviewed.   Constitutional:       General: He is not in acute distress.     Appearance: He is well-developed. He is not diaphoretic.   HENT:      Head: Normocephalic.      Right Ear: Tympanic membrane, ear canal and external ear normal.      Left Ear: Tympanic membrane, ear canal and external ear normal.      Nose: Nose normal.      Mouth/Throat:      Pharynx: No oropharyngeal exudate.   Eyes:      Conjunctiva/sclera: Conjunctivae normal.      Pupils: Pupils are equal, round, and reactive to light.   Neck:      Musculoskeletal: Normal range of motion and neck supple.      Thyroid: No thyroid mass or thyromegaly.      Vascular: Normal carotid pulses. No carotid bruit or JVD.      Trachea: No tracheal deviation.   Cardiovascular:      Rate and Rhythm: Normal rate and regular rhythm.      Heart sounds: Normal heart sounds. No murmur. No friction rub. No gallop.    Pulmonary:      Effort: Pulmonary effort is normal. No respiratory distress.      Breath sounds: Normal breath sounds. No stridor. No decreased breath sounds, wheezing, rhonchi or rales.   Abdominal:      General: Bowel sounds are normal. There is no distension.      Palpations: Abdomen is soft. There is no mass.      Tenderness: There is no abdominal tenderness. There is no guarding or rebound.      Hernia: No hernia is present.   Genitourinary:     Penis: Normal.       Scrotum/Testes: Normal.      Prostate: Normal.      Rectum: Normal.   Musculoskeletal: Normal range of motion.      Right lower leg: No edema.      Left lower leg: No edema.   Lymphadenopathy:      Cervical: No cervical adenopathy.   Skin:     General: Skin is warm and dry.      Findings: No rash.      Comments: Minimal to no psoriasis   Neurological:      Mental Status: He is alert and  oriented to person, place, and time.      Cranial Nerves: No cranial nerve deficit.      Sensory: No sensory deficit.      Motor: No abnormal muscle tone.      Coordination: Coordination normal.      Deep Tendon Reflexes: Reflexes normal.         Assessment:      ICD-10-CM    1. Psoriasis  L40.9    2. Screening for colon cancer  Z12.11 GASTROENTEROLOGY ADULT REF PROCEDURE ONLY   3. Essential hypertension  I10 CT Coronary Calcium Scan   4. Hyperglycemia  R73.9 CT Coronary Calcium Scan   5. Hyperlipidemia, unspecified hyperlipidemia type  E78.5 CT Coronary Calcium Scan   6. Nonspecific abnormal results of liver function study  R94.5         Plan: Overall he appears to be stable.  The Humira is working extremely well to control his psoriasis.  His blood pressure is adequately controlled.  My biggest concern is regarding his metabolic syndrome.  His weight is up 10 pounds, his glucose and A1c are elevated.  I had a long discussion with him about this and the fact that he needed to really start working on a healthy diet, exercise and weight reduction.  I also talked to the patient about his risk for vascular disease as he has multiple risk factors especially with his strong family history.  I recommended CT calcium score and if this is elevated to any degree we will start him on statin therapy as well.  He is in agreement and this will be ordered.  Current medications will continue without any change.  He will consider the Shingrix vaccine at some point.  He is due for colonoscopy, order for this is placed.  He is okay with proceeding without contraindication.  Follow-up will be as needed.

## 2020-07-08 NOTE — PROGRESS NOTES
Chief Complaint:  This patient is here for a comprehensive review of their multiple medical problems, renewal of medications and update on necessary health maintenance issues.      HPI: This patient is here today for complete evaluation and review of his chronic medical problems.  He has a history of psoriasis.  He is currently on therapy with Humira.  This is working very well to control his psoriasis and is not causing him any side effects whatsoever.    He also has a history of hypertension.  He is on multidrug therapy for control of his blood pressure and has good control.  He does not have any endorgan disease as a result of this.  He has a history of hyperlipidemia which is mild but he is currently not on a statin.  He has numerous cardiac risk factors but does not have any known vascular disease.    He has a history of hyperglycemia.  He is due for recheck on this.  Unfortunately, his weight is up 10 pounds over the last 2 years.  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  He has a history of transaminitis with a negative liver biopsy in the past.  He needs a recheck on that today as well.    Other than that he has no complaints or concerns.  Medications are reconciled.  Past medical history, past surgical history, family history and social histories are reviewed and updated.  Immunizations are up-to-date, I did talk to him about potentially getting the Shingrix vaccine.  He is due for a colonoscopy.    Past Medical History:   Diagnosis Date     Essential (primary) hypertension     No Comments Provided     Hyperlipidemia     No Comments Provided     Psoriasis     No Comments Provided     Transaminitis     negative liver biopsy       Past Surgical History:   Procedure Laterality Date     COLONOSCOPY  08/22/2010    WNL Q10yrs.     HERNIA REPAIR Right 2009     LIVER BIOPSY  1995       Current Outpatient  Medications   Medication Sig Dispense Refill     adalimumab (HUMIRA PEN) 40 MG/0.8ML pen kit Inject 0.8 mLs (40 mg) Subcutaneous every 14 days 1 each 11     aspirin 81 MG tablet Take 81 mg by mouth daily with food       lisinopril (PRINIVIL/ZESTRIL) 20 MG tablet Take 1 tablet (20 mg) by mouth daily 90 tablet 3     metoprolol succinate ER (TOPROL-XL) 50 MG 24 hr tablet Take 1 tablet (50 mg) by mouth daily 90 tablet 3     Multiple Vitamin (MULTI-VITAMINS) TABS Take 1 tablet by mouth daily         No Known Allergies    Family History   Problem Relation Age of Onset     Diabetes Father      Other - See Comments Father         adenomatous colonic polyps     Hyperlipidemia Father      Heart Disease Father         CHF     Arthritis Mother         Psoriatic arthritis     Heart Disease Brother         CABG     Heart Disease Brother         CABG     Heart Disease Brother         MI  @60     Other - See Comments Brother         Suicide     Heart Disease Brother         CABG     Family History Negative Sister         Good Health       Social History     Socioeconomic History     Marital status: Single     Spouse name: Not on file     Number of children: Not on file     Years of education: Not on file     Highest education level: Not on file   Occupational History     Not on file   Social Needs     Financial resource strain: Not on file     Food insecurity     Worry: Not on file     Inability: Not on file     Transportation needs     Medical: Not on file     Non-medical: Not on file   Tobacco Use     Smoking status: Former Smoker     Last attempt to quit: 1/15/2003     Years since quittin.4     Smokeless tobacco: Never Used   Substance and Sexual Activity     Alcohol use: Yes     Alcohol/week: 2.0 standard drinks     Frequency: 2-4 times a month     Drinks per session: 1 or 2     Comment: Occasional     Drug use: Yes     Comment: Drug use: NoMarijuana occasionally     Sexual activity: Never   Lifestyle     Physical  activity     Days per week: Not on file     Minutes per session: Not on file     Stress: Not on file   Relationships     Social connections     Talks on phone: Not on file     Gets together: Not on file     Attends Mu-ism service: Not on file     Active member of club or organization: Not on file     Attends meetings of clubs or organizations: Not on file     Relationship status: Not on file     Intimate partner violence     Fear of current or ex partner: Not on file     Emotionally abused: Not on file     Physically abused: Not on file     Forced sexual activity: Not on file   Other Topics Concern     Parent/sibling w/ CABG, MI or angioplasty before 65F 55M? Not Asked   Social History Narrative    He is single, works at [employer].  He lives in [place].       Review of Systems   Constitutional: Negative for appetite change, chills, diaphoresis, fatigue, fever and unexpected weight change.   HENT: Negative for ear pain, rhinorrhea, sinus pressure, sinus pain, sore throat, trouble swallowing and voice change.    Eyes: Negative for pain, redness and visual disturbance.   Respiratory: Negative for cough, chest tightness, shortness of breath and wheezing.    Cardiovascular: Negative for chest pain, palpitations and leg swelling.   Gastrointestinal: Negative for abdominal distention, abdominal pain, blood in stool, constipation, diarrhea, nausea and vomiting.   Endocrine: Negative for cold intolerance and heat intolerance.   Genitourinary: Negative for difficulty urinating, dysuria, flank pain, frequency and hematuria.   Musculoskeletal: Negative for back pain, joint swelling, neck pain and neck stiffness.   Skin: Negative for rash and wound.   Allergic/Immunologic: Negative for immunocompromised state.   Neurological: Negative for dizziness, tremors, seizures, syncope, speech difficulty, weakness, light-headedness, numbness and headaches.   Hematological: Negative for adenopathy. Does not bruise/bleed easily.    Psychiatric/Behavioral: Negative for agitation, behavioral problems, confusion, hallucinations and sleep disturbance. The patient is not nervous/anxious.        Physical Exam  Vitals signs and nursing note reviewed.   Constitutional:       General: He is not in acute distress.     Appearance: He is well-developed. He is not diaphoretic.   HENT:      Head: Normocephalic.      Right Ear: Tympanic membrane, ear canal and external ear normal.      Left Ear: Tympanic membrane, ear canal and external ear normal.      Nose: Nose normal.      Mouth/Throat:      Pharynx: No oropharyngeal exudate.   Eyes:      Conjunctiva/sclera: Conjunctivae normal.      Pupils: Pupils are equal, round, and reactive to light.   Neck:      Musculoskeletal: Normal range of motion and neck supple.      Thyroid: No thyroid mass or thyromegaly.      Vascular: Normal carotid pulses. No carotid bruit or JVD.      Trachea: No tracheal deviation.   Cardiovascular:      Rate and Rhythm: Normal rate and regular rhythm.      Heart sounds: Normal heart sounds. No murmur. No friction rub. No gallop.    Pulmonary:      Effort: Pulmonary effort is normal. No respiratory distress.      Breath sounds: Normal breath sounds. No stridor. No decreased breath sounds, wheezing, rhonchi or rales.   Abdominal:      General: Bowel sounds are normal. There is no distension.      Palpations: Abdomen is soft. There is no mass.      Tenderness: There is no abdominal tenderness. There is no guarding or rebound.      Hernia: No hernia is present.   Genitourinary:     Penis: Normal.       Scrotum/Testes: Normal.      Prostate: Normal.      Rectum: Normal.   Musculoskeletal: Normal range of motion.      Right lower leg: No edema.      Left lower leg: No edema.   Lymphadenopathy:      Cervical: No cervical adenopathy.   Skin:     General: Skin is warm and dry.      Findings: No rash.      Comments: Minimal to no psoriasis   Neurological:      Mental Status: He is alert and  oriented to person, place, and time.      Cranial Nerves: No cranial nerve deficit.      Sensory: No sensory deficit.      Motor: No abnormal muscle tone.      Coordination: Coordination normal.      Deep Tendon Reflexes: Reflexes normal.         Assessment:      ICD-10-CM    1. Psoriasis  L40.9    2. Screening for colon cancer  Z12.11 GASTROENTEROLOGY ADULT REF PROCEDURE ONLY   3. Essential hypertension  I10 CT Coronary Calcium Scan   4. Hyperglycemia  R73.9 CT Coronary Calcium Scan   5. Hyperlipidemia, unspecified hyperlipidemia type  E78.5 CT Coronary Calcium Scan   6. Nonspecific abnormal results of liver function study  R94.5         Plan: Overall he appears to be stable.  The Humira is working extremely well to control his psoriasis.  His blood pressure is adequately controlled.  My biggest concern is regarding his metabolic syndrome.  His weight is up 10 pounds, his glucose and A1c are elevated.  I had a long discussion with him about this and the fact that he needed to really start working on a healthy diet, exercise and weight reduction.  I also talked to the patient about his risk for vascular disease as he has multiple risk factors especially with his strong family history.  I recommended CT calcium score and if this is elevated to any degree we will start him on statin therapy as well.  He is in agreement and this will be ordered.  Current medications will continue without any change.  He will consider the Shingrix vaccine at some point.  He is due for colonoscopy, order for this is placed.  He is okay with proceeding without contraindication.  Follow-up will be as needed.

## 2020-07-09 DIAGNOSIS — Z12.11 ENCOUNTER FOR SCREENING COLONOSCOPY: Primary | ICD-10-CM

## 2020-07-09 NOTE — TELEPHONE ENCOUNTER
Screening Questions for the Scheduling of Screening Colonoscopies   (If Colonoscopy is diagnostic, Provider should review the chart before scheduling.)  Are you younger than 50 or older than 80?  NO   Do you take aspirin or fish oil?  YES - ASPIRIN   (if yes, tell patient to stop 1 week prior to Colonoscopy)  Do you take warfarin (Coumadin), clopidogrel (Plavix), apixaban (Eliquis), dabigatram (Pradaxa), rivaroxaban (Xarelto) or any blood thinner? NO   Do you use oxygen at home?  NO   Do you have kidney disease? NO   Are you on dialysis? NO   Have you had a stroke or heart attack in the last year? NO   Have you had a stent in your heart or any blood vessel in the last year? NO   Have you had a transplant of any organ? NO   Have you had a colonoscopy or upper endoscopy (EGD) before? YES          When?  10 YRS AGO   Date of scheduled Colonoscopy. 07/30/2020  Provider HOLLIE   Pharmacy [facility]

## 2020-07-10 RX ORDER — BISACODYL 5 MG
TABLET, DELAYED RELEASE (ENTERIC COATED) ORAL
Qty: 2 TABLET | Refills: 0 | Status: ON HOLD | OUTPATIENT
Start: 2020-07-10 | End: 2020-07-30

## 2020-07-10 RX ORDER — POLYETHYLENE GLYCOL 3350, SODIUM CHLORIDE, SODIUM BICARBONATE, POTASSIUM CHLORIDE 420; 11.2; 5.72; 1.48 G/4L; G/4L; G/4L; G/4L
4000 POWDER, FOR SOLUTION ORAL ONCE
Qty: 4000 ML | Refills: 0 | Status: SHIPPED | OUTPATIENT
Start: 2020-07-10 | End: 2020-07-10

## 2020-07-22 ENCOUNTER — HOSPITAL ENCOUNTER (OUTPATIENT)
Dept: CT IMAGING | Facility: OTHER | Age: 65
Discharge: HOME | End: 2020-07-22
Attending: INTERNAL MEDICINE | Admitting: INTERNAL MEDICINE

## 2020-07-22 DIAGNOSIS — E78.5 HYPERLIPIDEMIA, UNSPECIFIED HYPERLIPIDEMIA TYPE: Primary | ICD-10-CM

## 2020-07-22 DIAGNOSIS — E78.5 HYPERLIPIDEMIA, UNSPECIFIED HYPERLIPIDEMIA TYPE: ICD-10-CM

## 2020-07-22 DIAGNOSIS — I10 ESSENTIAL HYPERTENSION: ICD-10-CM

## 2020-07-22 DIAGNOSIS — R73.9 HYPERGLYCEMIA: ICD-10-CM

## 2020-07-22 PROCEDURE — 75571 CT HRT W/O DYE W/CA TEST: CPT

## 2020-07-22 RX ORDER — ROSUVASTATIN CALCIUM 20 MG/1
20 TABLET, COATED ORAL DAILY
Qty: 90 TABLET | Refills: 3 | Status: SHIPPED | OUTPATIENT
Start: 2020-07-22 | End: 2021-08-25

## 2020-07-27 ENCOUNTER — ALLIED HEALTH/NURSE VISIT (OUTPATIENT)
Dept: FAMILY MEDICINE | Facility: OTHER | Age: 65
End: 2020-07-27
Attending: SURGERY
Payer: MEDICARE

## 2020-07-27 DIAGNOSIS — Z12.11 ENCOUNTER FOR SCREENING COLONOSCOPY: ICD-10-CM

## 2020-07-27 PROCEDURE — U0003 INFECTIOUS AGENT DETECTION BY NUCLEIC ACID (DNA OR RNA); SEVERE ACUTE RESPIRATORY SYNDROME CORONAVIRUS 2 (SARS-COV-2) (CORONAVIRUS DISEASE [COVID-19]), AMPLIFIED PROBE TECHNIQUE, MAKING USE OF HIGH THROUGHPUT TECHNOLOGIES AS DESCRIBED BY CMS-2020-01-R: HCPCS | Mod: ZL | Performed by: SURGERY

## 2020-07-27 PROCEDURE — 99207 ZZC NO CHARGE NURSE ONLY: CPT

## 2020-07-27 PROCEDURE — C9803 HOPD COVID-19 SPEC COLLECT: HCPCS

## 2020-07-28 LAB
SARS-COV-2 RNA SPEC QL NAA+PROBE: NOT DETECTED
SPECIMEN SOURCE: NORMAL

## 2020-07-29 ENCOUNTER — ANESTHESIA EVENT (OUTPATIENT)
Dept: SURGERY | Facility: OTHER | Age: 65
End: 2020-07-29
Payer: MEDICARE

## 2020-07-30 ENCOUNTER — HOSPITAL ENCOUNTER (OUTPATIENT)
Facility: OTHER | Age: 65
Discharge: HOME | End: 2020-07-30
Attending: SURGERY | Admitting: SURGERY
Payer: MEDICARE

## 2020-07-30 ENCOUNTER — ANESTHESIA (OUTPATIENT)
Dept: SURGERY | Facility: OTHER | Age: 65
End: 2020-07-30
Payer: MEDICARE

## 2020-07-30 VITALS
SYSTOLIC BLOOD PRESSURE: 167 MMHG | TEMPERATURE: 97 F | DIASTOLIC BLOOD PRESSURE: 122 MMHG | WEIGHT: 218 LBS | BODY MASS INDEX: 35.03 KG/M2 | RESPIRATION RATE: 18 BRPM | HEIGHT: 66 IN | OXYGEN SATURATION: 96 %

## 2020-07-30 PROCEDURE — 25800030 ZZH RX IP 258 OP 636: Performed by: SURGERY

## 2020-07-30 PROCEDURE — 25000128 H RX IP 250 OP 636: Performed by: NURSE ANESTHETIST, CERTIFIED REGISTERED

## 2020-07-30 PROCEDURE — 45380 COLONOSCOPY AND BIOPSY: CPT | Performed by: SURGERY

## 2020-07-30 PROCEDURE — 45385 COLONOSCOPY W/LESION REMOVAL: CPT | Mod: PT | Performed by: SURGERY

## 2020-07-30 PROCEDURE — 25000125 ZZHC RX 250: Performed by: NURSE ANESTHETIST, CERTIFIED REGISTERED

## 2020-07-30 PROCEDURE — 45385 COLONOSCOPY W/LESION REMOVAL: CPT | Performed by: NURSE ANESTHETIST, CERTIFIED REGISTERED

## 2020-07-30 PROCEDURE — 25000125 ZZHC RX 250: Performed by: SURGERY

## 2020-07-30 PROCEDURE — 88305 TISSUE EXAM BY PATHOLOGIST: CPT

## 2020-07-30 PROCEDURE — 93010 ELECTROCARDIOGRAM REPORT: CPT | Performed by: INTERNAL MEDICINE

## 2020-07-30 RX ORDER — NALOXONE HYDROCHLORIDE 0.4 MG/ML
.1-.4 INJECTION, SOLUTION INTRAMUSCULAR; INTRAVENOUS; SUBCUTANEOUS
Status: CANCELLED | OUTPATIENT
Start: 2020-07-30 | End: 2020-07-31

## 2020-07-30 RX ORDER — METOCLOPRAMIDE 5 MG/1
5 TABLET ORAL EVERY 6 HOURS PRN
Status: DISCONTINUED | OUTPATIENT
Start: 2020-07-30 | End: 2020-07-30 | Stop reason: HOSPADM

## 2020-07-30 RX ORDER — ONDANSETRON 4 MG/1
4 TABLET, ORALLY DISINTEGRATING ORAL EVERY 6 HOURS PRN
Status: CANCELLED | OUTPATIENT
Start: 2020-07-30

## 2020-07-30 RX ORDER — LIDOCAINE 40 MG/G
CREAM TOPICAL
Status: DISCONTINUED | OUTPATIENT
Start: 2020-07-30 | End: 2020-07-30 | Stop reason: HOSPADM

## 2020-07-30 RX ORDER — METOCLOPRAMIDE HYDROCHLORIDE 5 MG/ML
5 INJECTION INTRAMUSCULAR; INTRAVENOUS EVERY 6 HOURS PRN
Status: CANCELLED | OUTPATIENT
Start: 2020-07-30

## 2020-07-30 RX ORDER — METOCLOPRAMIDE 5 MG/1
5 TABLET ORAL EVERY 6 HOURS PRN
Status: CANCELLED | OUTPATIENT
Start: 2020-07-30

## 2020-07-30 RX ORDER — ONDANSETRON 2 MG/ML
4 INJECTION INTRAMUSCULAR; INTRAVENOUS
Status: DISCONTINUED | OUTPATIENT
Start: 2020-07-30 | End: 2020-07-30 | Stop reason: HOSPADM

## 2020-07-30 RX ORDER — ONDANSETRON 4 MG/1
4 TABLET, ORALLY DISINTEGRATING ORAL EVERY 6 HOURS PRN
Status: DISCONTINUED | OUTPATIENT
Start: 2020-07-30 | End: 2020-07-30 | Stop reason: HOSPADM

## 2020-07-30 RX ORDER — FLUMAZENIL 0.1 MG/ML
0.2 INJECTION, SOLUTION INTRAVENOUS
Status: DISCONTINUED | OUTPATIENT
Start: 2020-07-30 | End: 2020-07-30 | Stop reason: HOSPADM

## 2020-07-30 RX ORDER — METOCLOPRAMIDE HYDROCHLORIDE 5 MG/ML
5 INJECTION INTRAMUSCULAR; INTRAVENOUS EVERY 6 HOURS PRN
Status: DISCONTINUED | OUTPATIENT
Start: 2020-07-30 | End: 2020-07-30 | Stop reason: HOSPADM

## 2020-07-30 RX ORDER — PROCHLORPERAZINE MALEATE 5 MG
5 TABLET ORAL EVERY 6 HOURS PRN
Status: CANCELLED | OUTPATIENT
Start: 2020-07-30

## 2020-07-30 RX ORDER — SODIUM CHLORIDE, SODIUM LACTATE, POTASSIUM CHLORIDE, CALCIUM CHLORIDE 600; 310; 30; 20 MG/100ML; MG/100ML; MG/100ML; MG/100ML
INJECTION, SOLUTION INTRAVENOUS CONTINUOUS
Status: DISCONTINUED | OUTPATIENT
Start: 2020-07-30 | End: 2020-07-30 | Stop reason: HOSPADM

## 2020-07-30 RX ORDER — NALOXONE HYDROCHLORIDE 0.4 MG/ML
.1-.4 INJECTION, SOLUTION INTRAMUSCULAR; INTRAVENOUS; SUBCUTANEOUS
Status: DISCONTINUED | OUTPATIENT
Start: 2020-07-30 | End: 2020-07-30 | Stop reason: HOSPADM

## 2020-07-30 RX ORDER — PROPOFOL 10 MG/ML
INJECTION, EMULSION INTRAVENOUS PRN
Status: DISCONTINUED | OUTPATIENT
Start: 2020-07-30 | End: 2020-07-30

## 2020-07-30 RX ORDER — LIDOCAINE HYDROCHLORIDE 20 MG/ML
INJECTION, SOLUTION INFILTRATION; PERINEURAL PRN
Status: DISCONTINUED | OUTPATIENT
Start: 2020-07-30 | End: 2020-07-30

## 2020-07-30 RX ORDER — FLUMAZENIL 0.1 MG/ML
0.2 INJECTION, SOLUTION INTRAVENOUS
Status: CANCELLED | OUTPATIENT
Start: 2020-07-30 | End: 2020-07-30

## 2020-07-30 RX ORDER — PROCHLORPERAZINE MALEATE 5 MG
5 TABLET ORAL EVERY 6 HOURS PRN
Status: DISCONTINUED | OUTPATIENT
Start: 2020-07-30 | End: 2020-07-30 | Stop reason: HOSPADM

## 2020-07-30 RX ORDER — SODIUM CHLORIDE, SODIUM LACTATE, POTASSIUM CHLORIDE, CALCIUM CHLORIDE 600; 310; 30; 20 MG/100ML; MG/100ML; MG/100ML; MG/100ML
INJECTION, SOLUTION INTRAVENOUS CONTINUOUS
Status: CANCELLED | OUTPATIENT
Start: 2020-07-30

## 2020-07-30 RX ORDER — ONDANSETRON 2 MG/ML
4 INJECTION INTRAMUSCULAR; INTRAVENOUS EVERY 6 HOURS PRN
Status: CANCELLED | OUTPATIENT
Start: 2020-07-30

## 2020-07-30 RX ORDER — ONDANSETRON 2 MG/ML
4 INJECTION INTRAMUSCULAR; INTRAVENOUS EVERY 6 HOURS PRN
Status: DISCONTINUED | OUTPATIENT
Start: 2020-07-30 | End: 2020-07-30 | Stop reason: HOSPADM

## 2020-07-30 RX ADMIN — PROPOFOL 100 MG: 10 INJECTION, EMULSION INTRAVENOUS at 11:20

## 2020-07-30 RX ADMIN — PROPOFOL 50 MG: 10 INJECTION, EMULSION INTRAVENOUS at 11:27

## 2020-07-30 RX ADMIN — LIDOCAINE HYDROCHLORIDE 60 MG: 20 INJECTION, SOLUTION INFILTRATION; PERINEURAL at 11:05

## 2020-07-30 RX ADMIN — PROPOFOL 100 MG: 10 INJECTION, EMULSION INTRAVENOUS at 11:12

## 2020-07-30 RX ADMIN — SODIUM CHLORIDE, POTASSIUM CHLORIDE, SODIUM LACTATE AND CALCIUM CHLORIDE: 600; 310; 30; 20 INJECTION, SOLUTION INTRAVENOUS at 10:22

## 2020-07-30 RX ADMIN — SODIUM CHLORIDE, POTASSIUM CHLORIDE, SODIUM LACTATE AND CALCIUM CHLORIDE: 600; 310; 30; 20 INJECTION, SOLUTION INTRAVENOUS at 11:05

## 2020-07-30 RX ADMIN — PROPOFOL 100 MG: 10 INJECTION, EMULSION INTRAVENOUS at 11:05

## 2020-07-30 ASSESSMENT — MIFFLIN-ST. JEOR: SCORE: 1703.65

## 2020-07-30 NOTE — DISCHARGE INSTRUCTIONS
[facility]  Adult Discharge Orders & Instructions    ________________________________________________________________          For 12 hours after surgery  1. Get plenty of rest.  A responsible adult must stay with you for at least 12 hours after you leave the hospital.   2. You may feel lightheaded.  IF so, sit for a few minutes before standing.  Have someone help you get up.   3. You may have a slight fever. Call the doctor if your fever is over 101 F (38.3 C) (taken under the tongue) or lasts longer than 24 hours.  4. You may have a dry mouth, a sore throat, muscle aches or trouble sleeping.  These should go away after 24 hours.  5. Do not make important or legal decisions.  6.   Do not drive or use heavy equipment.  If you have weakness or tingling, don't drive or use heavy equipment until this feeling goes away.    To contact a doctor, call   [phone]_______________________

## 2020-07-30 NOTE — INTERVAL H&P NOTE
I saw and examined Cecilio Nolan.  I have reviewed the history and physical and find no changes to the patient's medical status or condition with the exceptions noted below.     Eliazar Oakes MD   10:09 AM 7/30/2020

## 2020-07-30 NOTE — OP NOTE
PROCEDURE NOTE    SURGEON:Eliazar Oakes MD    PRE-OP DIAGNOSIS:  Screening Colonoscopy      POST-OP DIAGNOSIS: Colon polyps    PROCEDURE:  Colonoscopy with cold snare    SPECIMEN:    ID Type Source Tests Collected by Time Destination   A : transverse colon polyp Polyp Large Intestine, Transverse SURGICAL PATHOLOGY EXAM Eliazar Oakes MD 7/30/2020 11:22 AM    B :  Polyp Large Intestine, Sigmoid SURGICAL PATHOLOGY EXAM Eliazar Oakes MD 7/30/2020 11:27 AM          ANESTHESIA:  Monitor Anesthesia Care No anesthesia staff entered.   Coverage requested    ESTIMATED BLOOD LOSS: none    COMPLICATIONS:  None    INDICATION FOR THE PROCEDURE: The patient is a 65 year old male. The patient presents with need for screening. I explained to the patient the risks, benefits and alternatives to screening colonoscopy for evaluating for cancer or polyps. We discussed the risks including bleeding, perforation, potential inability to reach the cecum and the risks of sedation. The patient's questions were answered and the patient wished to proceed. Informed consent paperwork was completed.    PROCEDURE: The patient was taken to the endoscopy suite. Appropriate monitors were attached. The patient was placed in the left lateral decubitus position. Timeout was performed confirming the patient's identity and procedure to be performed.  After appropriate sedation was confirmed, digital rectal exam was performed.  There was normal tone and no gross abnormality was noted.  The lubricated colonoscope was introduced into the anus the colon was insufflated with air. The prep quality was adequate. Under direct visualization the scope was advanced to the cecum. The mucosa of the colon was inspected while withdrawing the scope. A 2 mm transverse colon polyp was removed with cold snare. Six small 2-4 mm polyps were removed from the sigmoid/rectum.  The scope was retroflexed in the rectum and the anorectal junction was inspected. No abnormalities  were noted. The scope was returned to aneutral position and the colon was decompressed. The scope was removed. The patient tolerated the procedure with no immediately apparent complication. The patient was taken to recovery in stable condition.    FOLLOW UP: RECOMMEND high fiber diet, will call with pathology results.     Eliazar Oakes MD on 7/30/2020 at 11:33 AM

## 2020-07-30 NOTE — ANESTHESIA POSTPROCEDURE EVALUATION
Patient: Cecilio Nolan    Procedure(s):  COLONOSCOPY, WITH POLYPECTOMY AND BIOPSY    Diagnosis:Screening for colon cancer [Z12.11]  Diagnosis Additional Information: No value filed.    Anesthesia Type:  MAC    Note:  Anesthesia Post Evaluation    Patient location during evaluation: Phase 2  Patient participation: Able to fully participate in evaluation  Level of consciousness: awake and alert  Pain management: adequate  Airway patency: patent  Cardiovascular status: acceptable  Respiratory status: acceptable  Hydration status: acceptable  PONV: none             Last vitals:  Vitals:    07/30/20 0955   BP: (!) 167/122   Resp: 18   Temp: 97  F (36.1  C)   SpO2: 96%         Electronically Signed By: AMELIA Abernathy CRNA  July 30, 2020  11:41 AM

## 2020-07-30 NOTE — ANESTHESIA PREPROCEDURE EVALUATION
Anesthesia Pre-Procedure Evaluation    Patient: Cecilio Nolan   MRN: [id number] : 1955          Preoperative Diagnosis: Screening for colon cancer [Z12.11]    Procedure(s):  COLONOSCOPY    Past Medical History:   Diagnosis Date     Essential (primary) hypertension     No Comments Provided     Hyperlipidemia     No Comments Provided     Psoriasis     No Comments Provided     Transaminitis     negative liver biopsy     Past Surgical History:   Procedure Laterality Date     COLONOSCOPY  2010    WNL Q10yrs.     HERNIA REPAIR Right 2009     LIVER BIOPSY         Anesthesia Evaluation     . Pt has had prior anesthetic.     No history of anesthetic complications          ROS/MED HX    ENT/Pulmonary:  - neg pulmonary ROS     Neurologic:  - neg neurologic ROS     Cardiovascular:     (+) hypertension----. : . . . :. .       METS/Exercise Tolerance:  >4 METS   Hematologic:  - neg hematologic  ROS       Musculoskeletal:  - neg musculoskeletal ROS       GI/Hepatic:  - neg GI/hepatic ROS       Renal/Genitourinary:  - ROS Renal section negative       Endo:  - neg endo ROS   (+) Obesity, .      Psychiatric:  - neg psychiatric ROS       Infectious Disease:  - neg infectious disease ROS       Malignancy:      - no malignancy   Other:    - neg other ROS                      Physical Exam  Normal systems: cardiovascular, pulmonary and dental    Airway   Mallampati: I  TM distance: >3 FB  Neck ROM: full    Dental     Cardiovascular   Rhythm and rate: regular and normal      Pulmonary    breath sounds clear to auscultation            Lab Results   Component Value Date    WBC 6.5 2020    HGB 15.4 2020    HCT 46.8 2020     2020     2020    POTASSIUM 4.1 2020    CHLORIDE 107 2020    CO2 25 2020    BUN 17 2020    CR 0.92 2020     (H) 2020    DEON 8.9 2020    ALBUMIN 3.9 2020    PROTTOTAL 7.2 2020    ALT 50 2020     "AST 43 (H) 07/08/2020    ALKPHOS 93 07/08/2020    BILITOTAL 0.7 07/08/2020       Preop Vitals  BP Readings from Last 3 Encounters:   07/30/20 (!) 167/122   07/08/20 138/78   06/20/20 122/80    Pulse Readings from Last 3 Encounters:   07/08/20 88   06/20/20 80   08/05/19 76      Resp Readings from Last 3 Encounters:   07/30/20 18   07/08/20 20   06/20/20 18    SpO2 Readings from Last 3 Encounters:   07/30/20 96%   07/08/20 96%      Temp Readings from Last 1 Encounters:   07/30/20 97  F (36.1  C) (Tympanic)    Ht Readings from Last 1 Encounters:   07/30/20 1.664 m (5' 5.5\")      Wt Readings from Last 1 Encounters:   07/30/20 98.9 kg (218 lb)    Estimated body mass index is 35.73 kg/m  as calculated from the following:    Height as of this encounter: 1.664 m (5' 5.5\").    Weight as of this encounter: 98.9 kg (218 lb).       Anesthesia Plan      History & Physical Review      ASA Status:  2 .    NPO Status:  > 6 hours    Plan for MAC with Intravenous induction.            Postoperative Care      Consents  Anesthetic plan, risks, benefits and alternatives discussed with:  Patient..                 AMELIA POWELL CRNA  "

## 2020-07-31 LAB — INTERPRETATION ECG - MUSE: NORMAL

## 2020-09-27 DIAGNOSIS — I10 ESSENTIAL HYPERTENSION: ICD-10-CM

## 2020-09-28 RX ORDER — LISINOPRIL 20 MG/1
TABLET ORAL
Qty: 90 TABLET | Refills: 1 | Status: SHIPPED | OUTPATIENT
Start: 2020-09-28 | End: 2021-05-11

## 2020-10-17 DIAGNOSIS — I10 ESSENTIAL HYPERTENSION: ICD-10-CM

## 2020-10-19 RX ORDER — METOPROLOL SUCCINATE 50 MG/1
TABLET, EXTENDED RELEASE ORAL
Qty: 90 TABLET | Refills: 1 | Status: SHIPPED | OUTPATIENT
Start: 2020-10-19 | End: 2021-07-07

## 2020-10-19 NOTE — TELEPHONE ENCOUNTER
[facility] sent Rx request for the following:   metoprolol succinate ER (TOPROL-XL) 50 MG 24 hr tablet  Sig:TAKE 1 TABLET BY MOUTH DAILY    Last Prescription Date:   08/05/2019  Last Fill Qty/Refills:         90, R-3    Last Office Visit:              07/08/2020 (Alexei)   Future Office visit:           None noted     Beta-Blockers Protocol Failed - 10/17/2020  9:59 AM        Failed - Blood pressure under 140/90 in past 12 months     BP Readings from Last 3 Encounters:   07/30/20 (!) 167/122   07/08/20 138/78   06/20/20 122/80              Unable to complete prescription refill per RN Medication Refill Policy.................... Quynh Shabazz RN ....................  10/19/2020   9:53 AM

## 2020-12-01 ENCOUNTER — ALLIED HEALTH/NURSE VISIT (OUTPATIENT)
Dept: FAMILY MEDICINE | Facility: OTHER | Age: 65
End: 2020-12-01
Attending: INTERNAL MEDICINE
Payer: MEDICARE

## 2020-12-01 DIAGNOSIS — R09.81 NASAL CONGESTION: Primary | ICD-10-CM

## 2020-12-01 PROCEDURE — U0003 INFECTIOUS AGENT DETECTION BY NUCLEIC ACID (DNA OR RNA); SEVERE ACUTE RESPIRATORY SYNDROME CORONAVIRUS 2 (SARS-COV-2) (CORONAVIRUS DISEASE [COVID-19]), AMPLIFIED PROBE TECHNIQUE, MAKING USE OF HIGH THROUGHPUT TECHNOLOGIES AS DESCRIBED BY CMS-2020-01-R: HCPCS | Mod: ZL | Performed by: INTERNAL MEDICINE

## 2020-12-01 PROCEDURE — 99207 PR NO CHARGE NURSE ONLY: CPT

## 2020-12-01 PROCEDURE — C9803 HOPD COVID-19 SPEC COLLECT: HCPCS

## 2020-12-01 NOTE — PROGRESS NOTES
Chief Complaint   Patient presents with     Covid 19 Testing     symptoms     Fever, chills, congestion, with exposure to COVID 19 virus    Medication Reconciliation: complete    Cristina Sands LPN

## 2020-12-03 LAB
SARS-COV-2 RNA SPEC QL NAA+PROBE: NOT DETECTED
SPECIMEN SOURCE: NORMAL

## 2021-01-03 ENCOUNTER — HEALTH MAINTENANCE LETTER (OUTPATIENT)
Age: 66
End: 2021-01-03

## 2021-01-11 ENCOUNTER — TELEPHONE (OUTPATIENT)
Dept: INTERNAL MEDICINE | Facility: OTHER | Age: 66
End: 2021-01-11

## 2021-01-11 DIAGNOSIS — I10 ESSENTIAL HYPERTENSION: ICD-10-CM

## 2021-01-11 DIAGNOSIS — E78.5 HYPERLIPIDEMIA, UNSPECIFIED HYPERLIPIDEMIA TYPE: Primary | ICD-10-CM

## 2021-01-11 DIAGNOSIS — R73.9 HYPERGLYCEMIA: ICD-10-CM

## 2021-01-11 NOTE — TELEPHONE ENCOUNTER
Some labs teed-up. Please review and sign.  Norma J. Gosselin, LPN .......  1/11/2021  3:52 PM

## 2021-01-11 NOTE — TELEPHONE ENCOUNTER
Pt has a med check scheduled on  1/18. He said he normally has labs done before those appt, so he is needing orders.

## 2021-01-18 ENCOUNTER — TELEPHONE (OUTPATIENT)
Dept: INTERNAL MEDICINE | Facility: OTHER | Age: 66
End: 2021-01-18

## 2021-01-18 ENCOUNTER — OFFICE VISIT (OUTPATIENT)
Dept: INTERNAL MEDICINE | Facility: OTHER | Age: 66
End: 2021-01-18
Attending: INTERNAL MEDICINE
Payer: COMMERCIAL

## 2021-01-18 VITALS
OXYGEN SATURATION: 95 % | BODY MASS INDEX: 35.84 KG/M2 | HEIGHT: 66 IN | SYSTOLIC BLOOD PRESSURE: 136 MMHG | HEART RATE: 74 BPM | TEMPERATURE: 97 F | WEIGHT: 223 LBS | DIASTOLIC BLOOD PRESSURE: 82 MMHG | RESPIRATION RATE: 16 BRPM

## 2021-01-18 DIAGNOSIS — E78.5 HYPERLIPIDEMIA, UNSPECIFIED HYPERLIPIDEMIA TYPE: Primary | ICD-10-CM

## 2021-01-18 DIAGNOSIS — R73.9 HYPERGLYCEMIA: ICD-10-CM

## 2021-01-18 DIAGNOSIS — I10 ESSENTIAL HYPERTENSION: ICD-10-CM

## 2021-01-18 DIAGNOSIS — I25.10 CORONARY ARTERY CALCIFICATION: ICD-10-CM

## 2021-01-18 LAB
ALBUMIN SERPL-MCNC: 4 G/DL (ref 3.5–5.7)
ALP SERPL-CCNC: 86 U/L (ref 34–104)
ALT SERPL W P-5'-P-CCNC: 37 U/L (ref 7–52)
ANION GAP SERPL CALCULATED.3IONS-SCNC: 7 MMOL/L (ref 3–14)
AST SERPL W P-5'-P-CCNC: 32 U/L (ref 13–39)
BASOPHILS # BLD AUTO: 0 10E9/L (ref 0–0.2)
BASOPHILS NFR BLD AUTO: 0.3 %
BILIRUB SERPL-MCNC: 0.5 MG/DL (ref 0.3–1)
BUN SERPL-MCNC: 12 MG/DL (ref 7–25)
CALCIUM SERPL-MCNC: 8.8 MG/DL (ref 8.6–10.3)
CHLORIDE SERPL-SCNC: 104 MMOL/L (ref 98–107)
CHOLEST SERPL-MCNC: 139 MG/DL
CO2 SERPL-SCNC: 25 MMOL/L (ref 21–31)
CREAT SERPL-MCNC: 0.92 MG/DL (ref 0.7–1.3)
DIFFERENTIAL METHOD BLD: NORMAL
EOSINOPHIL # BLD AUTO: 0.1 10E9/L (ref 0–0.7)
EOSINOPHIL NFR BLD AUTO: 1.3 %
ERYTHROCYTE [DISTWIDTH] IN BLOOD BY AUTOMATED COUNT: 12.8 % (ref 10–15)
GFR SERPL CREATININE-BSD FRML MDRD: 82 ML/MIN/{1.73_M2}
GLUCOSE SERPL-MCNC: 101 MG/DL (ref 70–105)
HBA1C MFR BLD: 6.3 % (ref 4–6)
HCT VFR BLD AUTO: 49.2 % (ref 40–53)
HDLC SERPL-MCNC: 44 MG/DL (ref 23–92)
HGB BLD-MCNC: 16 G/DL (ref 13.3–17.7)
IMM GRANULOCYTES # BLD: 0 10E9/L (ref 0–0.4)
IMM GRANULOCYTES NFR BLD: 0.3 %
LDLC SERPL CALC-MCNC: 75 MG/DL
LYMPHOCYTES # BLD AUTO: 2.1 10E9/L (ref 0.8–5.3)
LYMPHOCYTES NFR BLD AUTO: 27.1 %
MCH RBC QN AUTO: 30.1 PG (ref 26.5–33)
MCHC RBC AUTO-ENTMCNC: 32.5 G/DL (ref 31.5–36.5)
MCV RBC AUTO: 93 FL (ref 78–100)
MONOCYTES # BLD AUTO: 0.9 10E9/L (ref 0–1.3)
MONOCYTES NFR BLD AUTO: 11.6 %
NEUTROPHILS # BLD AUTO: 4.5 10E9/L (ref 1.6–8.3)
NEUTROPHILS NFR BLD AUTO: 59.4 %
NONHDLC SERPL-MCNC: 95 MG/DL
PLATELET # BLD AUTO: 209 10E9/L (ref 150–450)
POTASSIUM SERPL-SCNC: 4.4 MMOL/L (ref 3.5–5.1)
PROT SERPL-MCNC: 7 G/DL (ref 6.4–8.9)
RBC # BLD AUTO: 5.31 10E12/L (ref 4.4–5.9)
SODIUM SERPL-SCNC: 136 MMOL/L (ref 134–144)
TRIGL SERPL-MCNC: 99 MG/DL
WBC # BLD AUTO: 7.6 10E9/L (ref 4–11)

## 2021-01-18 PROCEDURE — 85025 COMPLETE CBC W/AUTO DIFF WBC: CPT | Mod: ZL | Performed by: INTERNAL MEDICINE

## 2021-01-18 PROCEDURE — 80053 COMPREHEN METABOLIC PANEL: CPT | Mod: ZL | Performed by: INTERNAL MEDICINE

## 2021-01-18 PROCEDURE — 83036 HEMOGLOBIN GLYCOSYLATED A1C: CPT | Mod: ZL | Performed by: INTERNAL MEDICINE

## 2021-01-18 PROCEDURE — G0463 HOSPITAL OUTPT CLINIC VISIT: HCPCS

## 2021-01-18 PROCEDURE — 80061 LIPID PANEL: CPT | Mod: ZL | Performed by: INTERNAL MEDICINE

## 2021-01-18 PROCEDURE — 36415 COLL VENOUS BLD VENIPUNCTURE: CPT | Mod: ZL | Performed by: INTERNAL MEDICINE

## 2021-01-18 PROCEDURE — 99214 OFFICE O/P EST MOD 30 MIN: CPT | Performed by: INTERNAL MEDICINE

## 2021-01-18 ASSESSMENT — PAIN SCALES - GENERAL: PAINLEVEL: NO PAIN (0)

## 2021-01-18 ASSESSMENT — ENCOUNTER SYMPTOMS
CONSTITUTIONAL NEGATIVE: 1
ALLERGIC/IMMUNOLOGIC NEGATIVE: 1
ENDOCRINE NEGATIVE: 1
HEMATOLOGIC/LYMPHATIC NEGATIVE: 1

## 2021-01-18 ASSESSMENT — MIFFLIN-ST. JEOR: SCORE: 1726.52

## 2021-01-18 NOTE — TELEPHONE ENCOUNTER
Patient is scheduled for appointment today.  This writer will discuss with patient at appointment.  Ashley Raymond LPN 1/18/2021   9:03 AM

## 2021-01-18 NOTE — NURSING NOTE
"Chief Complaint   Patient presents with     Recheck Medication       Initial /82 (BP Location: Right arm, Patient Position: Sitting, Cuff Size: Adult Regular)   Pulse 74   Temp 97  F (36.1  C) (Tympanic)   Resp 16   Ht 1.664 m (5' 5.51\")   Wt 101.2 kg (223 lb)   SpO2 95%   BMI 36.53 kg/m   Estimated body mass index is 36.53 kg/m  as calculated from the following:    Height as of this encounter: 1.664 m (5' 5.51\").    Weight as of this encounter: 101.2 kg (223 lb).  Medication Reconciliation: complete  Ashley Raymond LPN  "

## 2021-01-18 NOTE — LETTER
January 18, 2021      Cecilio Nolan  [address]  [place] [address]        Dear Bartolo,    Below are the results of your recent labs:    Results for orders placed or performed in visit on 01/18/21   Lipid Profile     Status: None   Result Value Ref Range    Cholesterol 139 <200 mg/dL    Triglycerides 99 <150 mg/dL    HDL Cholesterol 44 23 - 92 mg/dL    LDL Cholesterol Calculated 75 <100 mg/dL    Non HDL Cholesterol 95 <130 mg/dL   CBC and Differential     Status: None   Result Value Ref Range    WBC 7.6 4.0 - 11.0 10e9/L    RBC Count 5.31 4.4 - 5.9 10e12/L    Hemoglobin 16.0 13.3 - 17.7 g/dL    Hematocrit 49.2 40.0 - 53.0 %    MCV 93 78 - 100 fl    MCH 30.1 26.5 - 33.0 pg    MCHC 32.5 31.5 - 36.5 g/dL    RDW 12.8 10.0 - 15.0 %    Platelet Count 209 150 - 450 10e9/L    Diff Method Automated Method     % Neutrophils 59.4 %    % Lymphocytes 27.1 %    % Monocytes 11.6 %    % Eosinophils 1.3 %    % Basophils 0.3 %    % Immature Granulocytes 0.3 %    Absolute Neutrophil 4.5 1.6 - 8.3 10e9/L    Absolute Lymphocytes 2.1 0.8 - 5.3 10e9/L    Absolute Monocytes 0.9 0.0 - 1.3 10e9/L    Absolute Eosinophils 0.1 0.0 - 0.7 10e9/L    Absolute Basophils 0.0 0.0 - 0.2 10e9/L    Abs Immature Granulocytes 0.0 0 - 0.4 10e9/L   Comprehensive Metabolic Panel     Status: None   Result Value Ref Range    Sodium 136 134 - 144 mmol/L    Potassium 4.4 3.5 - 5.1 mmol/L    Chloride 104 98 - 107 mmol/L    Carbon Dioxide 25 21 - 31 mmol/L    Anion Gap 7 3 - 14 mmol/L    Glucose 101 70 - 105 mg/dL    Urea Nitrogen 12 7 - 25 mg/dL    Creatinine 0.92 0.70 - 1.30 mg/dL    GFR Estimate 82 >60 mL/min/[1.73_m2]    GFR Estimate If Black >90 >60 mL/min/[1.73_m2]    Calcium 8.8 8.6 - 10.3 mg/dL    Bilirubin Total 0.5 0.3 - 1.0 mg/dL    Albumin 4.0 3.5 - 5.7 g/dL    Protein Total 7.0 6.4 - 8.9 g/dL    Alkaline Phosphatase 86 34 - 104 U/L    ALT 37 7 - 52 U/L    AST 32 13 - 39 U/L   Hemoglobin A1c     Status: Abnormal   Result Value Ref Range    Hemoglobin A1C  6.3 (H) 4.0 - 6.0 %        Overall, your blood tests look fine.  Your cholesterol level looks great.  Your diabetes test is still a little bit on the high side so that is what you need to focus on through healthy diet, exercise and weight loss.  Assuming all goes well, return in 6 months for a physical.    Sincerely,        Benjamin Linda MD  Internal Medicine  [facility]

## 2021-01-18 NOTE — PROGRESS NOTES
Chief Complaint:  F/U on medications.    HPI: He is here for follow-up.  He has treated hypertension and is on 2 drug therapy.  His blood pressure is controlled.  He has hyperlipidemia that was previously not treated but now that he has diagnosed coronary artery calcification, he is taking rosuvastatin 20 mg daily.  He is tolerating this medication without difficulty.  He has not had his lipids rechecked since being on it.  He does not have any chest pains.  He also has a history of obesity and needs to really work hard on weight reduction.  He just retired last week so he tells me that he is committed to eating better, exercising and working on his weight reduction.    Medications are reconciled.  He has had his colonoscopy.  He does not need any medication refills.  He had questions about the Shingrix vaccine which I recommended that he get.    Past Medical History:   Diagnosis Date     Adenomatous colon polyp 2020    F/U due in 2025     Coronary artery calcification      Essential (primary) hypertension     No Comments Provided     Hyperlipidemia     No Comments Provided     Psoriasis     No Comments Provided     Transaminitis     negative liver biopsy       Past Surgical History:   Procedure Laterality Date     COLONOSCOPY  08/22/2010    WNL Q10yrs.     COLONOSCOPY N/A 7/30/2020    Procedure: COLONOSCOPY, WITH POLYPECTOMY AND BIOPSY;  Surgeon: Eliazar Oakes MD;  Location: [facility] OR     HERNIA REPAIR Right 2009     LIVER BIOPSY  1995       No Known Allergies    Current Outpatient Medications   Medication Sig Dispense Refill     adalimumab (HUMIRA PEN) 40 MG/0.8ML pen kit Inject 40 mg Subcutaneous every 14 days  1 each 11     aspirin 81 MG tablet Take 81 mg by mouth daily with food       lisinopril (ZESTRIL) 20 MG tablet TAKE 1 TABLET BY MOUTH DAILY 90 tablet 1     metoprolol succinate ER (TOPROL-XL) 50 MG 24 hr tablet TAKE 1 TABLET BY MOUTH DAILY 90 tablet 1     Multiple Vitamin (MULTI-VITAMINS) TABS Take 1 tablet  by mouth daily       rosuvastatin (CRESTOR) 20 MG tablet Take 1 tablet (20 mg) by mouth daily 90 tablet 3       Social History     Socioeconomic History     Marital status: Single     Spouse name: Not on file     Number of children: Not on file     Years of education: Not on file     Highest education level: Not on file   Occupational History     Not on file   Social Needs     Financial resource strain: Not on file     Food insecurity     Worry: Not on file     Inability: Not on file     Transportation needs     Medical: Not on file     Non-medical: Not on file   Tobacco Use     Smoking status: Former Smoker     Quit date: 1/15/2003     Years since quittin.0     Smokeless tobacco: Never Used   Substance and Sexual Activity     Alcohol use: Yes     Alcohol/week: 2.0 standard drinks     Frequency: 2-4 times a month     Drinks per session: 1 or 2     Comment: Occasional     Drug use: Yes     Comment: Drug use: NoMarijuana occasionally     Sexual activity: Not Currently   Lifestyle     Physical activity     Days per week: Not on file     Minutes per session: Not on file     Stress: Not on file   Relationships     Social connections     Talks on phone: Not on file     Gets together: Not on file     Attends Voodoo service: Not on file     Active member of club or organization: Not on file     Attends meetings of clubs or organizations: Not on file     Relationship status: Not on file     Intimate partner violence     Fear of current or ex partner: Not on file     Emotionally abused: Not on file     Physically abused: Not on file     Forced sexual activity: Not on file   Other Topics Concern     Parent/sibling w/ CABG, MI or angioplasty before 65F 55M? Not Asked   Social History Narrative    He is single, worked at [employer], retired 2021.  He lives in [place].       Review of Systems   Constitutional: Negative.    Endocrine: Negative.    Skin: Negative.    Allergic/Immunologic: Negative.    Hematological:  Negative.        Physical Exam  Vitals signs and nursing note reviewed.   Constitutional:       General: He is not in acute distress.     Appearance: Normal appearance. He is not ill-appearing, toxic-appearing or diaphoretic.   Neurological:      Mental Status: He is alert.         Assessment:      ICD-10-CM    1. Hyperlipidemia, unspecified hyperlipidemia type  E78.5 Lipid Profile   2. Coronary artery calcification  I25.10     I25.84    3. Essential hypertension  I10 CBC and Differential     Comprehensive Metabolic Panel   4. Hyperglycemia  R73.9 Hemoglobin A1c       Plan: Medications will continue without change.  Complete lab drawn and pending including lipid panel as well as liver function and A1c.  I will send him a letter with the results and any recommendation for change.  It is likely that I will have him back in 6 months for a physical.  Strongly encouraged him to work hard on weight reduction as well as a healthy diet to keep his risk factors minimized.

## 2021-05-11 DIAGNOSIS — I10 ESSENTIAL HYPERTENSION: ICD-10-CM

## 2021-05-11 RX ORDER — LISINOPRIL 20 MG/1
TABLET ORAL
Qty: 90 TABLET | Refills: 2 | Status: SHIPPED | OUTPATIENT
Start: 2021-05-11 | End: 2022-02-07

## 2021-05-11 NOTE — TELEPHONE ENCOUNTER
"Requested Prescriptions   Pending Prescriptions Disp Refills     lisinopril (ZESTRIL) 20 MG tablet [Pharmacy Med Name: LISINOPRIL 20 MG TABLET] 90 tablet 1     Sig: TAKE 1 TABLET BY MOUTH DAILY       ACE Inhibitors (Including Combos) Protocol Passed - 5/11/2021 12:09 AM        Passed - Blood pressure under 140/90 in past 12 months     BP Readings from Last 3 Encounters:   01/18/21 136/82   07/30/20 (!) 167/122   07/08/20 138/78                 Passed - Recent (12 mo) or future (30 days) visit within the authorizing provider's specialty     Patient has had an office visit with the authorizing provider or a provider within the authorizing providers department within the previous 12 mos or has a future within next 30 days. See \"Patient Info\" tab in inbasket, or \"Choose Columns\" in Meds & Orders section of the refill encounter.              Passed - Medication is active on med list        Passed - Patient is age 18 or older        Passed - Normal serum creatinine on file in past 12 months     Recent Labs   Lab Test 01/18/21  1257   CR 0.92       Ok to refill medication if creatinine is low          Passed - Normal serum potassium on file in past 12 months     Recent Labs   Lab Test 01/18/21  1257   POTASSIUM 4.4                LOV 1/18/21 Linda  Prescription approved per [facility] Refill Protocol.  Brenda J. Goodell, RN on 5/11/2021 at 9:48 AM    "

## 2021-06-25 ENCOUNTER — TELEPHONE (OUTPATIENT)
Dept: INTERNAL MEDICINE | Facility: OTHER | Age: 66
End: 2021-06-25

## 2021-06-25 DIAGNOSIS — L40.9 PSORIASIS: ICD-10-CM

## 2021-06-25 NOTE — TELEPHONE ENCOUNTER
Patient took last Humara and is out needs before 2 weeks--- He talked to the company and they also said they were getting a hold of you.Please call patient on monday

## 2021-06-28 NOTE — TELEPHONE ENCOUNTER
Spoke with patient, verified name/. Received request for refill. Confirmed pharmacy: [facility]. Writer attached pharmacy to refill request..

## 2021-06-28 NOTE — TELEPHONE ENCOUNTER
[facility]  sent Rx request for the following:         Requested Prescriptions   Pending Prescriptions Disp Refills     adalimumab (HUMIRA PEN) 40 MG/0.8ML pen kit 1 each 11     Sig: Inject 0.8 mLs (40 mg) Subcutaneous every 14 days     Last Prescription Date:   8/5/2019   Entered as Historical.   Last Fill Qty/Refills:         1 each , R-8/5/2019    Last Office Visit:              1/18/2022  Future Office visit:           none    Routing refill request to provider for review/approval because:  Drug not on the [facility], [facility] [place] or [facility] refill protocol or controlled substance  Drug not on the [facility] refill protocol         There is no refill protocol information for this order        Unable to complete prescription refill per RN Medication Refill Policy.................... Maureen Rodriguez RN ....................  6/28/2021   10:31 AM

## 2021-07-06 DIAGNOSIS — I10 ESSENTIAL HYPERTENSION: ICD-10-CM

## 2021-07-06 NOTE — LETTER
July 7, 2021      Cecilio Nolan     [facility] [address]        Dear Cecilio,     A refill request was received from your pharmacy for Metoprolol.    Additional refills require an office visit with Dr. Linda for annual review and labs.    Please call [phone] to schedule appointment.        Sincerely,      Refill Nurse

## 2021-07-07 RX ORDER — METOPROLOL SUCCINATE 50 MG/1
TABLET, EXTENDED RELEASE ORAL
Qty: 90 TABLET | Refills: 0 | Status: SHIPPED | OUTPATIENT
Start: 2021-07-07 | End: 2021-08-25

## 2021-07-07 NOTE — TELEPHONE ENCOUNTER
"Prescription approved per [facility] Refill Protocol.  LOV: 1/18/2021  Per result letter 1/18/2021  \" Assuming all goes well, return in 6 months for a physical.\"   letter and my chart message sent  Darlene Pedro RN on 7/7/2021 at 8:38 AM    "

## 2021-07-08 ENCOUNTER — TELEPHONE (OUTPATIENT)
Dept: INTERNAL MEDICINE | Facility: OTHER | Age: 66
End: 2021-07-08

## 2021-07-08 DIAGNOSIS — R73.9 HYPERGLYCEMIA: ICD-10-CM

## 2021-07-08 DIAGNOSIS — I25.10 CORONARY ARTERY CALCIFICATION: ICD-10-CM

## 2021-07-08 DIAGNOSIS — I10 ESSENTIAL HYPERTENSION: Primary | ICD-10-CM

## 2021-07-08 DIAGNOSIS — L40.9 PSORIASIS: ICD-10-CM

## 2021-07-08 NOTE — TELEPHONE ENCOUNTER
NITZA-pt would like to have labs prior to scheduled visit. Please call to advise. Thank you.  Kendal Lynn

## 2021-07-12 NOTE — TELEPHONE ENCOUNTER
Pt notified. He will get the labs before his physical.    Denver Mahoney, LPN on 7/12/2021 at 12:26 PM

## 2021-07-26 ENCOUNTER — OFFICE VISIT (OUTPATIENT)
Dept: INTERNAL MEDICINE | Facility: OTHER | Age: 66
End: 2021-07-26
Attending: INTERNAL MEDICINE
Payer: MEDICARE

## 2021-07-26 ENCOUNTER — LAB (OUTPATIENT)
Dept: LAB | Facility: OTHER | Age: 66
End: 2021-07-26
Attending: INTERNAL MEDICINE
Payer: COMMERCIAL

## 2021-07-26 ENCOUNTER — HOSPITAL ENCOUNTER (OUTPATIENT)
Dept: GENERAL RADIOLOGY | Facility: OTHER | Age: 66
End: 2021-07-26
Attending: INTERNAL MEDICINE
Payer: MEDICARE

## 2021-07-26 VITALS
DIASTOLIC BLOOD PRESSURE: 72 MMHG | TEMPERATURE: 98.3 F | OXYGEN SATURATION: 94 % | HEART RATE: 80 BPM | SYSTOLIC BLOOD PRESSURE: 134 MMHG | BODY MASS INDEX: 36.49 KG/M2 | WEIGHT: 240.8 LBS | RESPIRATION RATE: 20 BRPM | HEIGHT: 68 IN

## 2021-07-26 DIAGNOSIS — L40.9 PSORIASIS: ICD-10-CM

## 2021-07-26 DIAGNOSIS — E66.812 CLASS 2 OBESITY DUE TO EXCESS CALORIES WITHOUT SERIOUS COMORBIDITY WITH BODY MASS INDEX (BMI) OF 36.0 TO 36.9 IN ADULT: ICD-10-CM

## 2021-07-26 DIAGNOSIS — I10 ESSENTIAL HYPERTENSION: ICD-10-CM

## 2021-07-26 DIAGNOSIS — E78.5 HYPERLIPIDEMIA, UNSPECIFIED HYPERLIPIDEMIA TYPE: ICD-10-CM

## 2021-07-26 DIAGNOSIS — L40.9 PSORIASIS: Primary | ICD-10-CM

## 2021-07-26 DIAGNOSIS — I10 ESSENTIAL HYPERTENSION: Primary | ICD-10-CM

## 2021-07-26 DIAGNOSIS — R06.09 DOE (DYSPNEA ON EXERTION): ICD-10-CM

## 2021-07-26 DIAGNOSIS — R73.9 HYPERGLYCEMIA: ICD-10-CM

## 2021-07-26 DIAGNOSIS — N42.9 PROSTATE DISORDER: ICD-10-CM

## 2021-07-26 DIAGNOSIS — E66.09 CLASS 2 OBESITY DUE TO EXCESS CALORIES WITHOUT SERIOUS COMORBIDITY WITH BODY MASS INDEX (BMI) OF 36.0 TO 36.9 IN ADULT: ICD-10-CM

## 2021-07-26 DIAGNOSIS — I25.10 CORONARY ARTERY CALCIFICATION: ICD-10-CM

## 2021-07-26 LAB
ALBUMIN SERPL-MCNC: 3.9 G/DL (ref 3.5–5.7)
ALP SERPL-CCNC: 87 U/L (ref 34–104)
ALT SERPL W P-5'-P-CCNC: 34 U/L (ref 7–52)
ANION GAP SERPL CALCULATED.3IONS-SCNC: 7 MMOL/L (ref 3–14)
AST SERPL W P-5'-P-CCNC: 30 U/L (ref 13–39)
ATRIAL RATE - MUSE: 77 BPM
BILIRUB SERPL-MCNC: 0.6 MG/DL (ref 0.3–1)
BUN SERPL-MCNC: 15 MG/DL (ref 7–25)
CALCIUM SERPL-MCNC: 8.5 MG/DL (ref 8.6–10.3)
CHLORIDE BLD-SCNC: 106 MMOL/L (ref 98–107)
CHOLEST SERPL-MCNC: 131 MG/DL
CO2 SERPL-SCNC: 25 MMOL/L (ref 21–31)
CREAT SERPL-MCNC: 0.9 MG/DL (ref 0.7–1.3)
DIASTOLIC BLOOD PRESSURE - MUSE: NORMAL MMHG
ERYTHROCYTE [DISTWIDTH] IN BLOOD BY AUTOMATED COUNT: 12.8 % (ref 10–15)
FASTING STATUS PATIENT QL REPORTED: NORMAL
GFR SERPL CREATININE-BSD FRML MDRD: 88 ML/MIN/1.73M2
GLUCOSE BLD-MCNC: 113 MG/DL (ref 70–105)
HBA1C MFR BLD: 6.4 % (ref 4–6.2)
HCT VFR BLD AUTO: 46.2 % (ref 40–53)
HDLC SERPL-MCNC: 39 MG/DL (ref 23–92)
HGB BLD-MCNC: 15.2 G/DL (ref 13.3–17.7)
INTERPRETATION ECG - MUSE: NORMAL
LDLC SERPL CALC-MCNC: 74 MG/DL
MCH RBC QN AUTO: 30.9 PG (ref 26.5–33)
MCHC RBC AUTO-ENTMCNC: 32.9 G/DL (ref 31.5–36.5)
MCV RBC AUTO: 94 FL (ref 78–100)
NONHDLC SERPL-MCNC: 92 MG/DL
P AXIS - MUSE: 68 DEGREES
PLATELET # BLD AUTO: 180 10E3/UL (ref 150–450)
POTASSIUM BLD-SCNC: 3.9 MMOL/L (ref 3.5–5.1)
PR INTERVAL - MUSE: 160 MS
PROT SERPL-MCNC: 6.7 G/DL (ref 6.4–8.9)
PSA SERPL-MCNC: 0.16 UG/L (ref 0–4)
QRS DURATION - MUSE: 92 MS
QT - MUSE: 374 MS
QTC - MUSE: 423 MS
R AXIS - MUSE: 27 DEGREES
RBC # BLD AUTO: 4.92 10E6/UL (ref 4.4–5.9)
SODIUM SERPL-SCNC: 138 MMOL/L (ref 134–144)
SYSTOLIC BLOOD PRESSURE - MUSE: NORMAL MMHG
T AXIS - MUSE: 8 DEGREES
TRIGL SERPL-MCNC: 92 MG/DL
VENTRICULAR RATE- MUSE: 77 BPM
WBC # BLD AUTO: 6.8 10E3/UL (ref 4–11)

## 2021-07-26 PROCEDURE — G0439 PPPS, SUBSEQ VISIT: HCPCS | Performed by: INTERNAL MEDICINE

## 2021-07-26 PROCEDURE — 36415 COLL VENOUS BLD VENIPUNCTURE: CPT | Mod: ZL

## 2021-07-26 PROCEDURE — 82465 ASSAY BLD/SERUM CHOLESTEROL: CPT | Mod: ZL

## 2021-07-26 PROCEDURE — 71046 X-RAY EXAM CHEST 2 VIEWS: CPT

## 2021-07-26 PROCEDURE — 84153 ASSAY OF PSA TOTAL: CPT | Mod: ZL

## 2021-07-26 PROCEDURE — G0463 HOSPITAL OUTPT CLINIC VISIT: HCPCS | Mod: 25

## 2021-07-26 PROCEDURE — 99214 OFFICE O/P EST MOD 30 MIN: CPT | Mod: 25 | Performed by: INTERNAL MEDICINE

## 2021-07-26 PROCEDURE — 93000 ELECTROCARDIOGRAM COMPLETE: CPT | Performed by: INTERNAL MEDICINE

## 2021-07-26 PROCEDURE — 80053 COMPREHEN METABOLIC PANEL: CPT | Mod: ZL

## 2021-07-26 PROCEDURE — 93005 ELECTROCARDIOGRAM TRACING: CPT

## 2021-07-26 PROCEDURE — 83036 HEMOGLOBIN GLYCOSYLATED A1C: CPT | Mod: ZL

## 2021-07-26 PROCEDURE — 85027 COMPLETE CBC AUTOMATED: CPT | Mod: ZL

## 2021-07-26 ASSESSMENT — MIFFLIN-ST. JEOR: SCORE: 1841.76

## 2021-07-26 ASSESSMENT — PAIN SCALES - GENERAL: PAINLEVEL: SEVERE PAIN (6)

## 2021-07-26 NOTE — PROGRESS NOTES
SUBJECTIVE:   Cecilio Nolan is a 66 year old male who presents for Preventive Visit.      Patient has been advised of split billing requirements and indicates understanding: Yes   Are you in the first 12 months of your Medicare coverage?  No    HPI     He is in today for a wellness visit.  He tells me that in most respects he feels well up until the last couple of weeks when he started having some problems.  He has a history of psoriasis that is well controlled with Humira.  He is happy with the results.  He also has a history of hypertension and is on multidrug therapy with good control of his blood pressure and good tolerance of his medication.  He has a history of hyperlipidemia with coronary artery calcification seen on previous scanning.  He is on appropriate aspirin and statin therapy for this.    He has obesity.  His weight continues to go up ever since he retired.  We had a long discussion about this today especially in light of the fact that he has hyperglycemia and is certainly at risk for developing diabetes.    He has been having shortness of breath.  He gives [place] then [place] the hill.  Once he got to the top of the hill he could hardly catch his breath because he felt wheezy and short of breath.  No chest pain with this.  He also admits that he has been having some swelling in his lower extremities on occasion.  He does have the coronary artery calcification but no previous cardiac history.    He is also been having some left-sided back pain.  This has been for a few weeks.  It just feels stiff and sore.  It does not radiate down his leg at all.  No bowel or bladder symptoms with it.    Medications are reconciled.  Past medical history, past surgical history, family history and social histories are reviewed and updated.  Immunizations are up-to-date.  Colonoscopy up-to-date.    Do you feel safe in your environment? Yes    Have you ever done Advance  Care Planning? (For example, a Health Directive, POLST, or a discussion with a medical provider or your loved ones about your wishes): No, advance care planning information given to patient to review.  Patient plans to discuss their wishes with loved ones or provider.         Fall risk      Cognitive Screening   1) Repeat 3 items (Leader, Season, Table)    2) Clock draw: ABNORMAL   3) 3 item recall: Recalls 1 object   Results: ABNORMAL clock, 1-2 items recalled: PROBABLE COGNITIVE IMPAIRMENT, **INFORM PROVIDER**    Mini-CogTM Copyright S Diallo. Licensed by the author for use in [facility]; reprinted with permission (he@[facility]). All rights reserved.      Do you have sleep apnea, excessive snoring or daytime drowsiness?: yes    Reviewed and updated as needed this visit by clinical staff  Tobacco  Allergies  Meds  Problems  Med Hx  Surg Hx  Fam Hx  Soc Hx          Reviewed and updated as needed this visit by Provider  Tobacco  Allergies  Meds  Problems  Med Hx  Surg Hx  Fam Hx         Social History     Tobacco Use     Smoking status: Former Smoker     Quit date: 1/15/2003     Years since quittin.5     Smokeless tobacco: Never Used   Substance Use Topics     Alcohol use: Yes     Alcohol/week: 2.0 standard drinks     Comment: Occasional     If you drink alcohol do you typically have >3 drinks per day or >7 drinks per week? No    Alcohol Use 2021   Prescreen: >3 drinks/day or >7 drinks/week? No           Current Outpatient Medications   Medication     adalimumab (HUMIRA PEN) 40 MG/0.8ML pen kit     aspirin 81 MG tablet     lisinopril (ZESTRIL) 20 MG tablet     metoprolol succinate ER (TOPROL-XL) 50 MG 24 hr tablet     Multiple Vitamin (MULTI-VITAMINS) TABS     rosuvastatin (CRESTOR) 20 MG tablet     No current facility-administered medications for this visit.         Current providers sharing in care for this patient include:   Patient Care Team:  Benjamin Linda MD as PCP -  "General (Internal Medicine)  Benjamin Linda MD as Assigned PCP    The following health maintenance items are reviewed in Epic and correct as of today:  There are no preventive care reminders to display for this patient.  Lab work is in process          Review of Systems  Constitutional, HEENT, cardiovascular, pulmonary, GI, , musculoskeletal, neuro, skin, endocrine and psych systems are negative, except as otherwise noted.    OBJECTIVE:   /72 (BP Location: Right arm, Patient Position: Sitting, Cuff Size: Adult Regular)   Pulse 80   Temp 98.3  F (36.8  C) (Tympanic)   Resp 20   Ht 1.727 m (5' 8\")   Wt 109.2 kg (240 lb 12.8 oz)   SpO2 94%   BMI 36.61 kg/m   Estimated body mass index is 36.61 kg/m  as calculated from the following:    Height as of this encounter: 1.727 m (5' 8\").    Weight as of this encounter: 109.2 kg (240 lb 12.8 oz).  Physical Exam  GENERAL: healthy, alert and no distress  EYES: Eyes grossly normal to inspection, PERRL and conjunctivae and sclerae normal  HENT: ear canals and TM's normal, nose and mouth without ulcers or lesions  NECK: no adenopathy, no asymmetry, masses, or scars and thyroid normal to palpation  RESP: lungs clear to auscultation - no rales, rhonchi or wheezes  CV: regular rate and rhythm, normal S1 S2, no S3 or S4, no murmur, click or rub, 2+ pedal edema  ABDOMEN: soft, nontender, no hepatosplenomegaly, no masses and bowel sounds normal  : Normal male, no hernia.  Prostate high riding and not well palpated  MS: no gross musculoskeletal defects noted, no edema  SKIN: no suspicious lesions or rashes  NEURO: Normal strength and tone, mentation intact and speech normal  PSYCH: mentation appears normal, affect normal/bright        ASSESSMENT / PLAN:       ICD-10-CM    1. Psoriasis  L40.9    2. Class 2 obesity due to excess calories without serious comorbidity with body mass index (BMI) of 36.0 to 36.9 in adult  E66.09     Z68.36    3. Essential hypertension  I10    4. " "Hyperlipidemia, unspecified hyperlipidemia type  E78.5    5. Hyperglycemia  R73.9    6. Coronary artery calcification  I25.10     I25.84    7. ORTIZ (dyspnea on exertion)  R06.00        Patient has been advised of split billing requirements and indicates understanding: Yes  COUNSELING:    This patient is having increasing troubles and I think it is probably most likely just related to his poor diet and weight gain and lack of exercise.  We do need to rule out occult coronary disease etc.  Chest x-ray today was normal.  EKG today was normal.  His weight is up and he has fluid retention.  His lab work was basically normal other than for his hyperglycemia.  I had a long discussion with him today about the fact that he really needs to turn things around with his diet, exercise and weight.  We will get him scheduled for a stress echo to rule out ischemia, this will be a bicycle test.  I will want him to hold his metoprolol for 36 hours prior to this test.  Medications will continue without change.  In regards to his back pain, I am going to just have him wait and see how this goes over the next couple of weeks.  My expectation is that this is a temporary strain or irritation and it should improve without incident or issue.  If that is not the case, we may need to consider further investigation.    Reviewed preventive health counseling, as reflected in patient instructions       Regular exercise       Healthy diet/nutrition    Estimated body mass index is 36.61 kg/m  as calculated from the following:    Height as of this encounter: 1.727 m (5' 8\").    Weight as of this encounter: 109.2 kg (240 lb 12.8 oz).    Weight management plan: Discussed healthy diet and exercise guidelines    He reports that he quit smoking about 18 years ago. He has never used smokeless tobacco.      Appropriate preventive services were discussed with this patient, including applicable screening as appropriate for cardiovascular disease, diabetes, " osteopenia/osteoporosis, and glaucoma.  As appropriate for age/gender, discussed screening for colorectal cancer, prostate cancer, breast cancer, and cervical cancer. Checklist reviewing preventive services available has been given to the patient.    Reviewed patients plan of care and provided an AVS. The Basic Care Plan (routine screening as documented in Health Maintenance) for Cecilio meets the Care Plan requirement. This Care Plan has been established and reviewed with the Patient.    Counseling Resources:  ATP IV Guidelines  Pooled Cohorts Equation Calculator  Breast Cancer Risk Calculator  Breast Cancer: Medication to Reduce Risk  FRAX Risk Assessment  ICSI Preventive Guidelines  Dietary Guidelines for Americans, 2010  USDA's MyPlate  ASA Prophylaxis  Lung CA Screening    ROSARIO ALDRICH MD  [facility]    Identified Health Risks:

## 2021-08-06 ENCOUNTER — TELEPHONE (OUTPATIENT)
Dept: CARDIOLOGY | Facility: OTHER | Age: 66
End: 2021-08-06

## 2021-08-06 NOTE — TELEPHONE ENCOUNTER
Message left for reminder of appointment 8/10/21 to hold metoprolol 36 hours.  To call with any questions.  Our direct number [phone].

## 2021-08-10 ENCOUNTER — HOSPITAL ENCOUNTER (OUTPATIENT)
Dept: CARDIOLOGY | Facility: OTHER | Age: 66
Discharge: HOME | End: 2021-08-10
Attending: INTERNAL MEDICINE | Admitting: INTERNAL MEDICINE
Payer: MEDICARE

## 2021-08-10 DIAGNOSIS — R06.09 DOE (DYSPNEA ON EXERTION): ICD-10-CM

## 2021-08-10 DIAGNOSIS — R06.09 DOE (DYSPNEA ON EXERTION): Primary | ICD-10-CM

## 2021-08-10 DIAGNOSIS — I25.10 CORONARY ARTERY CALCIFICATION: ICD-10-CM

## 2021-08-10 PROCEDURE — 93016 CV STRESS TEST SUPVJ ONLY: CPT | Performed by: INTERNAL MEDICINE

## 2021-08-10 PROCEDURE — 255N000002 HC RX 255 OP 636: Performed by: INTERNAL MEDICINE

## 2021-08-10 PROCEDURE — C8928 TTE W OR W/O FOL W/CON,STRES: HCPCS | Performed by: INTERNAL MEDICINE

## 2021-08-10 PROCEDURE — 93018 CV STRESS TEST I&R ONLY: CPT | Performed by: INTERNAL MEDICINE

## 2021-08-10 PROCEDURE — 93017 CV STRESS TEST TRACING ONLY: CPT

## 2021-08-10 PROCEDURE — 93321 DOPPLER ECHO F-UP/LMTD STD: CPT | Mod: 26 | Performed by: INTERNAL MEDICINE

## 2021-08-10 RX ORDER — ALBUTEROL SULFATE 90 UG/1
2 AEROSOL, METERED RESPIRATORY (INHALATION) EVERY 4 HOURS PRN
Qty: 18 G | Refills: 3 | Status: SHIPPED | OUTPATIENT
Start: 2021-08-10 | End: 2023-09-28

## 2021-08-10 RX ADMIN — PERFLUTREN 4 ML: 6.52 INJECTION, SUSPENSION INTRAVENOUS at 11:25

## 2021-08-10 NOTE — PROGRESS NOTES
0950 The patient arrived for a stress echo.  The procedure, risks and benefits were discussed and the consent was signed.  The patient was prepped for the stress test, and an IV was placed per procedure.  The echo sonographer did the initial images with definity for image enhancement. Dr. Linda arrived, and the patient biked 6 minutes and 57seconds.  The patient tolerated the procedure.  Stress images were completed and the IV was removed per procedure.  The patient was released in stable condition.  The patient was instructed that the ordering MD will call with results in one to two days.  If you have not received your results within 3 days please call the ordering provider.  Please see the chart for complete test results.

## 2021-08-25 DIAGNOSIS — I10 ESSENTIAL HYPERTENSION: ICD-10-CM

## 2021-08-25 DIAGNOSIS — E78.5 HYPERLIPIDEMIA, UNSPECIFIED HYPERLIPIDEMIA TYPE: ICD-10-CM

## 2021-08-25 NOTE — TELEPHONE ENCOUNTER
[id number] [place] [facility] [address] Is requesting refills of the following:    metoprolol succinate ER (TOPROL-XL) 50 MG 24 hr tablet 90 tablet 0 7/7/2021  No   Sig: TAKE 1 TABLET BY MOUTH DAILY     [id number] [place] [facility] [address]       rosuvastatin (CRESTOR) 20 MG tablet [Pharmacy Med Name: ROSUVASTATIN CALCIUM 20 MG TAB] 90 tablet 3     Sig: TAKE 1 TABLET BY MOUTH EVERY DAY   Last Prescription Date:   7/22/20  Last Fill Qty/Refills:         90, R-3      Last Office Visit:              7/26/21   Future Office visit:           None    Unable to complete prescription refill per RN Medication Refill Policy. Heather Holly RN .............. 8/25/2021  4:49 PM

## 2021-08-26 RX ORDER — METOPROLOL SUCCINATE 50 MG/1
TABLET, EXTENDED RELEASE ORAL
Qty: 90 TABLET | Refills: 2 | Status: SHIPPED | OUTPATIENT
Start: 2021-08-26 | End: 2022-09-14

## 2021-08-26 RX ORDER — ROSUVASTATIN CALCIUM 20 MG/1
TABLET, COATED ORAL
Qty: 90 TABLET | Refills: 2 | Status: SHIPPED | OUTPATIENT
Start: 2021-08-26 | End: 2022-05-12

## 2021-10-09 ENCOUNTER — HEALTH MAINTENANCE LETTER (OUTPATIENT)
Age: 66
End: 2021-10-09

## 2021-11-16 ENCOUNTER — OFFICE VISIT (OUTPATIENT)
Dept: FAMILY MEDICINE | Facility: OTHER | Age: 66
End: 2021-11-16
Attending: PHYSICIAN ASSISTANT
Payer: MEDICARE

## 2021-11-16 ENCOUNTER — HOSPITAL ENCOUNTER (OUTPATIENT)
Dept: GENERAL RADIOLOGY | Facility: OTHER | Age: 66
End: 2021-11-16
Attending: NURSE PRACTITIONER
Payer: MEDICARE

## 2021-11-16 VITALS
HEART RATE: 98 BPM | DIASTOLIC BLOOD PRESSURE: 84 MMHG | BODY MASS INDEX: 36.52 KG/M2 | WEIGHT: 240.2 LBS | OXYGEN SATURATION: 93 % | TEMPERATURE: 98.3 F | RESPIRATION RATE: 18 BRPM | SYSTOLIC BLOOD PRESSURE: 134 MMHG

## 2021-11-16 DIAGNOSIS — M25.562 LEFT MEDIAL KNEE PAIN: Primary | ICD-10-CM

## 2021-11-16 DIAGNOSIS — E66.01 MORBID OBESITY (H): ICD-10-CM

## 2021-11-16 PROCEDURE — 73562 X-RAY EXAM OF KNEE 3: CPT | Mod: LT

## 2021-11-16 PROCEDURE — 99213 OFFICE O/P EST LOW 20 MIN: CPT | Performed by: NURSE PRACTITIONER

## 2021-11-16 PROCEDURE — G0463 HOSPITAL OUTPT CLINIC VISIT: HCPCS | Mod: 25

## 2021-11-16 PROCEDURE — G0463 HOSPITAL OUTPT CLINIC VISIT: HCPCS

## 2021-11-16 ASSESSMENT — PAIN SCALES - GENERAL: PAINLEVEL: MODERATE PAIN (5)

## 2021-11-16 NOTE — NURSING NOTE
"Chief Complaint   Patient presents with     Leg Pain      Leg pain has been going on for about a week on and off. His left leg that he is having pain is appears to be more swollen than the right leg. He does point to a spot on his knee but he states that the pain has been up is leg and down by his ankle as well. He describes the pain as a sharp pain.     Initial There were no vitals taken for this visit. Estimated body mass index is 36.61 kg/m  as calculated from the following:    Height as of 7/26/21: 1.727 m (5' 8\").    Weight as of 7/26/21: 109.2 kg (240 lb 12.8 oz).     FOOD SECURITY SCREENING QUESTIONS  Hunger Vital Signs:  Within the past 12 months we worried whether our food would run out before we got money to buy more. Never  Within the past 12 months the food we bought just didn't last and we didn't have money to get more. Never      Medication Reconciliation: Complete      Elgin Doe LPN   "

## 2021-11-16 NOTE — PROGRESS NOTES
ASSESSMENT/PLAN:    I have reviewed the nursing notes.  I have reviewed the findings, diagnosis, plan and need for follow up with the patient.    1. Left medial knee pain  - XR Knee Left 3 Views  Xray obtained today to rule out fracture, loose body, severe arthritis or degenerative changes, effusion.   Normal xray, reviewed in office.   At this time, symptoms consistent with possible nerve impingement or tendinitis. Recommended conservative treatment including nsaids alternating with tylenol, dosing discussed. Ice/rest/elevation and compression. Patient agreeable to purchasing a compression sleeve/brace at drug store and follow up with PCP if symptoms persist and do not improve with these measures.   I discussed there are no signs of DVT, septic joint/infection, or gout.     Discussed warning signs/symptoms indicative of need to f/u    Follow up if symptoms persist or worsen or concerns    I explained my diagnostic considerations and recommendations to the patient, who voiced understanding and agreement with the treatment plan. All questions were answered. We discussed potential side effects of any prescribed or recommended therapies, as well as expectations for response to treatments.    Danuta Chavez NP  11/16/2021  11:51 AM    HPI:  Cecilio Nolan is a 66 year old male who presents to [facility] today for concerns of leg pain that has been ongoing for about 10 days. Left leg that is painful, sometimes sharp/ shooting other times just feels sore. The pain is present with walking but is actually worse at rest. It starts in the medial aspect of the left knee and shoots to ankle at times, about 2-3x per day varying in duration. He has not experienced anything like this before. Nothing seems to be helping, has been taking tylenol arthritis strength, 3 at a time. Has not tried nsaids. No history of CKD or ulcers. Is able to take nsaids to his knowledge. No known history of blood clots. Non smoker. No cough/chest  pain/shortness of breath. Is on humira for psoriasis - immune suppressed. No trauma or falls. No fever/chills. Knee does not become red or swollen .      Past Medical History:   Diagnosis Date     Adenomatous colon polyp     F/U due in      Coronary artery calcification      Essential (primary) hypertension     No Comments Provided     Hyperlipidemia     No Comments Provided     Psoriasis     No Comments Provided     Transaminitis     negative liver biopsy     Past Surgical History:   Procedure Laterality Date     COLONOSCOPY  2010    WNL Q10yrs.     COLONOSCOPY N/A 2020    Procedure: COLONOSCOPY, WITH POLYPECTOMY AND BIOPSY;  Surgeon: Eliazar Oakes MD;  Location: [facility] OR     HERNIA REPAIR Right 2009     LIVER BIOPSY       Social History     Tobacco Use     Smoking status: Former Smoker     Quit date: 1/15/2003     Years since quittin.8     Smokeless tobacco: Never Used   Substance Use Topics     Alcohol use: Yes     Alcohol/week: 2.0 standard drinks     Comment: Occasional     Current Outpatient Medications   Medication Sig Dispense Refill     adalimumab (HUMIRA PEN) 40 MG/0.8ML pen kit Inject 0.8 mLs (40 mg) Subcutaneous every 14 days 1 each 11     albuterol (PROAIR HFA/PROVENTIL HFA/VENTOLIN HFA) 108 (90 Base) MCG/ACT inhaler Inhale 2 puffs into the lungs every 4 hours as needed for shortness of breath / dyspnea or wheezing 18 g 3     aspirin 81 MG tablet Take 81 mg by mouth daily with food       lisinopril (ZESTRIL) 20 MG tablet TAKE 1 TABLET BY MOUTH DAILY 90 tablet 2     metoprolol succinate ER (TOPROL-XL) 50 MG 24 hr tablet TAKE 1 TABLET BY MOUTH DAILY 90 tablet 2     Multiple Vitamin (MULTI-VITAMINS) TABS Take 1 tablet by mouth daily       rosuvastatin (CRESTOR) 20 MG tablet TAKE 1 TABLET BY MOUTH EVERY DAY 90 tablet 2     No Known Allergies  Past medical history, past surgical history, current medications and allergies reviewed and accurate to the best of my knowledge.       ROS:  Refer to HPI    /84   Pulse 98   Temp 98.3  F (36.8  C) (Tympanic)   Resp 18   Wt 109 kg (240 lb 3.2 oz)   SpO2 93%   BMI 36.52 kg/m      EXAM:  General Appearance: Well appearing 66 year old male, appropriate appearance for age. No acute distress   Respiratory: normal chest wall and respirations.  Normal effort.  Clear to auscultation bilaterally, no wheezing, crackles or rhonchi. No increased work of breathing.  No cough appreciated.  Cardiac: RRR with no murmurs  Musculoskeletal:  Equal movement of bilateral upper extremities.  Equal movement of bilateral lower extremities.  Normal gait.    Gait: normal gait with no signs of guarding or compensation. Patient is able to get up and go from a seated position, in order to ambulate.  Appearance of the RIGHT knee: Skin is clean, dry, and non-ecchymotic. Effusion none. Tenderness to palpation medial hamstring.  Patellar tracking is normal. ROM: flexion full, extension full. Stable to varus, valgus, Lachman, A&P drawer ligamentous stressing. Adela negative. Extension strength 5/5. Neurovascular status, distal pulses and sensation intact.    Appearance of the LEFT knee: Skin is clean, dry, and non-ecchymotic. Effusion none. Tenderness to palpation none.  Patellar tracking is normal. ROM: flexion full, extension full.  Stable to varus, valgus, Lachman, A&P drawer ligamentous stressing. Adela negative. Extension strength 5/5. Neurovascular status, distal pulses and sensation intact.    Psychological: normal affect, alert, oriented, and pleasant.     Results for orders placed or performed in visit on 11/16/21   XR Knee Left 3 Views     Status: None    Narrative    PROCEDURE:  XR KNEE LEFT 3 VIEWS    HISTORY: atraumatic medial knee pain that radiates up and down the  leg; worse with sitting and standing still x 10 days; Left medial knee  pain    COMPARISON:  None.    TECHNIQUE:  3 views of the left knee were obtained.    FINDINGS:  No fracture or  dislocation is identified. The joint spaces  are preserved.  There is no knee effusion      Impression    IMPRESSION: Normal left knee      DIOGO MCKEON MD         SYSTEM ID:  [id number]

## 2021-11-16 NOTE — PATIENT INSTRUCTIONS
At this time, your knee pain is most likely related to a tendinopathy / inflammation.     I recommend rest, elevation, compression with ace bandage wrap or knee brace. Nsaids, recommend 600 mg of ibuprofen every 8 hours. May continue to alternate with tylenol 650 or 1000 mg every 8 hours.     Follow up with PCP if symptoms persist or worsen despite treatment plan.

## 2022-02-06 DIAGNOSIS — I10 ESSENTIAL HYPERTENSION: ICD-10-CM

## 2022-02-07 RX ORDER — LISINOPRIL 20 MG/1
TABLET ORAL
Qty: 90 TABLET | Refills: 2 | Status: SHIPPED | OUTPATIENT
Start: 2022-02-07 | End: 2022-08-17

## 2022-02-07 NOTE — TELEPHONE ENCOUNTER
Prescription refilled per RN Medication Refill Policy..................Lisa Ramos RN 2/7/2022 11:31 AM

## 2022-06-04 ENCOUNTER — OFFICE VISIT (OUTPATIENT)
Dept: FAMILY MEDICINE | Facility: OTHER | Age: 67
End: 2022-06-04
Attending: NURSE PRACTITIONER
Payer: MEDICARE

## 2022-06-04 VITALS
OXYGEN SATURATION: 98 % | BODY MASS INDEX: 37.46 KG/M2 | SYSTOLIC BLOOD PRESSURE: 130 MMHG | TEMPERATURE: 97.6 F | WEIGHT: 246.4 LBS | DIASTOLIC BLOOD PRESSURE: 74 MMHG | HEART RATE: 84 BPM | RESPIRATION RATE: 15 BRPM

## 2022-06-04 DIAGNOSIS — S30.861A TICK BITE OF ABDOMEN, INITIAL ENCOUNTER: Primary | ICD-10-CM
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
## 2022-06-04 PROCEDURE — G0463 HOSPITAL OUTPT CLINIC VISIT: HCPCS

## 2022-06-04 PROCEDURE — 99212 OFFICE O/P EST SF 10 MIN: CPT | Performed by: NURSE PRACTITIONER

## 2022-06-04 RX ORDER — DOXYCYCLINE 100 MG/1
200 CAPSULE ORAL DAILY
Qty: 2 CAPSULE | Refills: 0 | Status: SHIPPED | OUTPATIENT
Start: 2022-06-04 | End: 2022-06-05

## 2022-06-04 ASSESSMENT — PAIN SCALES - GENERAL: PAINLEVEL: MILD PAIN (2)

## 2022-06-04 NOTE — PROGRESS NOTES
HPI:    Cecilio Nolan is a 67 year old male  who presents to [facility] today for tick bite on left abdomen. Red round lesion, regular tick he pulled off two days ago.     ROS:  Refer to HPI    /74 (BP Location: Right arm, Patient Position: Sitting, Cuff Size: Adult Large)   Pulse 84   Temp 97.6  F (36.4  C) (Tympanic)   Resp 15   Wt 111.8 kg (246 lb 6.4 oz)   SpO2 98%   BMI 37.46 kg/m      EXAM:  General Appearance: Well appearing male, appropriate appearance for age. No acute distress  Respiratory: normal chest wall and respirations.  Normal effort.  Clear to auscultation bilaterally, no wheezing, crackles or rhonchi.  No increased work of breathing.  No cough appreciated.  Cardiac: RRR with no murmurs  Musculoskeletal:  Equal movement of bilateral upper extremities.  Equal movement of bilateral lower extremities.  Normal gait.    Dermatological: round red lesion, left abdomen, central black, soft scab.  Psychological: normal affect, alert, oriented, and pleasant.     Diagnostics:  No results found for any visits on 06/04/22.      ASSESSMENT/PLAN:  Cecilio was seen today for insect bites.    Diagnoses and all orders for this visit:    Tick bite of abdomen, initial encounter  -     doxycycline hyclate (VIBRAMYCIN) 100 MG capsule; Take 2 capsules (200 mg) by mouth daily for 1 day      May use over-the-counter Tylenol or ibuprofen PRN for discomfort, swelling, or fever.  Wash with soap and water. Monitor for changing symptoms.  Discussed warning signs/symptoms indicative of need to follow-up.    Follow up with PCP or ED if symptoms persist or worsen or concerns.    I explained my diagnostic considerations and recommendations to the patient, who voiced understanding and agreement with the treatment plan. All questions were answered. We discussed potential side effects of any prescribed or recommended therapies, as well as expectations for response to treatments.    AMELIA Ramirez, AGNP-C  [facility]  2022 3:08 PM        Total time spent with this patient was 10  minutes which included chart review, visualization and interpretation of labs/images, time spent with the patient and documentation.    Past Medical History:   Diagnosis Date     Adenomatous colon polyp     F/U due in      Coronary artery calcification      Essential (primary) hypertension     No Comments Provided     Hyperlipidemia     No Comments Provided     Psoriasis     No Comments Provided     Transaminitis     negative liver biopsy     Past Surgical History:   Procedure Laterality Date     COLONOSCOPY  2010    WNL Q10yrs.     COLONOSCOPY N/A 2020    Procedure: COLONOSCOPY, WITH POLYPECTOMY AND BIOPSY;  Surgeon: Eliazar Oakes MD;  Location: [facility] OR     HERNIA REPAIR Right 2009     LIVER BIOPSY       Social History     Tobacco Use     Smoking status: Former Smoker     Quit date: 1/15/2003     Years since quittin.3     Smokeless tobacco: Never Used   Substance Use Topics     Alcohol use: Yes     Alcohol/week: 2.0 standard drinks     Comment: Occasional     Current Outpatient Medications   Medication Sig Dispense Refill     adalimumab (HUMIRA PEN) 40 MG/0.8ML pen kit Inject 0.8 mLs (40 mg) Subcutaneous every 14 days 1 each 11     albuterol (PROAIR HFA/PROVENTIL HFA/VENTOLIN HFA) 108 (90 Base) MCG/ACT inhaler Inhale 2 puffs into the lungs every 4 hours as needed for shortness of breath / dyspnea or wheezing 18 g 3     aspirin 81 MG tablet Take 81 mg by mouth daily with food       lisinopril (ZESTRIL) 20 MG tablet TAKE 1 TABLET BY MOUTH DAILY 90 tablet 2     metoprolol succinate ER (TOPROL-XL) 50 MG 24 hr tablet TAKE 1 TABLET BY MOUTH DAILY 90 tablet 2     Multiple Vitamin (MULTI-VITAMINS) TABS Take 1 tablet by mouth daily       rosuvastatin (CRESTOR) 20 MG tablet TAKE 1 TABLET BY MOUTH EVERY DAY 90 tablet 0     No Known Allergies    Past medical history, past surgical history, current medications and allergies  reviewed and accurate to the best of my knowledge.

## 2022-06-04 NOTE — NURSING NOTE
"Chief Complaint   Patient presents with     Insect Bites     abdomen     Patient presents to clinic with a tick bite to abdomen that he noticed on 6/1 (about).    Initial /74 (BP Location: Right arm, Patient Position: Sitting, Cuff Size: Adult Large)   Pulse 84   Temp 97.6  F (36.4  C) (Tympanic)   Resp 15   Wt 111.8 kg (246 lb 6.4 oz)   SpO2 98%   BMI 37.46 kg/m   Estimated body mass index is 37.46 kg/m  as calculated from the following:    Height as of 7/26/21: 1.727 m (5' 8\").    Weight as of this encounter: 111.8 kg (246 lb 6.4 oz).  Medication Reconciliation: complete      FOOD SECURITY SCREENING QUESTIONS:    The next two questions are to help us understand your food security.  If you are feeling you need any assistance in this area, we have resources available to support you today.    Hunger Vital Signs:  Within the past 12 months we worried whether our food would run out before we got money to buy more. Never  Within the past 12 months the food we bought just didn't last and we didn't have money to get more. Never  Yuliya Steen LPN,LPN on 6/4/2022 at 1:53 PM      Yuliya Steen LPN  "

## 2022-07-12 ENCOUNTER — VIRTUAL VISIT (OUTPATIENT)
Dept: INTERNAL MEDICINE | Facility: OTHER | Age: 67
End: 2022-07-12
Attending: NURSE PRACTITIONER
Payer: COMMERCIAL

## 2022-07-12 DIAGNOSIS — Z86.16 HISTORY OF 2019 NOVEL CORONAVIRUS DISEASE (COVID-19): Chronic | ICD-10-CM

## 2022-07-12 DIAGNOSIS — L40.9 PSORIASIS: ICD-10-CM

## 2022-07-12 PROBLEM — E66.9 OBESITY: Status: RESOLVED | Noted: 2018-02-01 | Resolved: 2022-07-12

## 2022-07-12 PROCEDURE — 99212 OFFICE O/P EST SF 10 MIN: CPT | Mod: 95 | Performed by: NURSE PRACTITIONER

## 2022-07-12 ASSESSMENT — ACTIVITIES OF DAILY LIVING (ADL)
WALKING_OR_CLIMBING_STAIRS_DIFFICULTY: NO
CONCENTRATING,_REMEMBERING_OR_MAKING_DECISIONS_DIFFICULTY: NO
DIFFICULTY_COMMUNICATING: NO
HEARING_DIFFICULTY_OR_DEAF: NO
DOING_ERRANDS_INDEPENDENTLY_DIFFICULTY: NO
WEAR_GLASSES_OR_BLIND: NO
DRESSING/BATHING_DIFFICULTY: NO
CHANGE_IN_FUNCTIONAL_STATUS_SINCE_ONSET_OF_CURRENT_ILLNESS/INJURY: NO
DIFFICULTY_EATING/SWALLOWING: NO
FALL_HISTORY_WITHIN_LAST_SIX_MONTHS: NO

## 2022-07-12 ASSESSMENT — ENCOUNTER SYMPTOMS: SHORTNESS OF BREATH: 1

## 2022-07-12 NOTE — PROGRESS NOTES
"Bartolo is a 67 year old who is being evaluated via a billable telephone visit.      What phone number would you like to be contacted at? [phone]  How would you like to obtain your AVS? MyChart    Assessment & Plan     Psoriasis  Refill sent.  - adalimumab (HUMIRA PEN) 40 MG/0.8ML pen kit  Dispense: 1 each; Refill: 11    History of 2019 novel coronavirus disease (COVID-19)  Does not need to quarantine any longer. Advised to monitor symptoms for any worsening and RTC if this happens.    Prescription drug management  15 minutes spent on the date of the encounter doing chart review, history and exam, documentation and further activities per the note     BMI:   Estimated body mass index is 37.46 kg/m  as calculated from the following:    Height as of 7/26/21: 1.727 m (5' 8\").    Weight as of 6/4/22: 111.8 kg (246 lb 6.4 oz).     Return if symptoms worsen or fail to improve.    Geraldine Daugherty NP  [facility]    Subjective   Bartolo is a 67 year old male, presenting for the following health issues: Recheck Medication    History of Present Illness       Reason for visit:  Med refill        Medication Followup of Humira    Taking Medication as prescribed: yes    Side Effects:  None    Medication Helping Symptoms:  yes    Review of Systems   Constitutional:        Recent COVID infection, reports improving symptoms   Respiratory: Positive for shortness of breath (due to COVID ).    Skin:        Psoriasis well managed with use of Humira   All other systems reviewed and are negative.         Objective       Vitals:  No vitals were obtained today due to virtual visit.    Physical Exam   healthy, alert and no distress  PSYCH: Alert and oriented times 3; coherent speech, normal   rate and volume, able to articulate logical thoughts, able   to abstract reason, no tangential thoughts, no hallucinations   or delusions  His affect is normal and pleasant  RESP: No cough, no audible wheezing, able to talk in full " sentences  Remainder of exam unable to be completed due to telephone visits        Phone call duration: 7 minutes    Nursing Notes:   Geraldine Daugherty NP  7/12/2022 11:00 AM  Signed  Has COVID, needs refill of Humira        .  ..

## 2022-07-26 NOTE — NURSING NOTE
Detail Level: Simple Patient Information     Patient Name MRN Sex Cecilio Contreras [id number] Male 1955      Nursing Note by Amber Giang at 2017  6:15 PM     Author:  Amber Giang Service:  (none) Author Type:  (none)     Filed:  2017  6:45 PM Encounter Date:  2017 Status:  Signed     :  Amber Giang            Patient presents today with tick bites, one on his back and one behind right knee.  He is not sure how long they were stuck to him.  The spot on his back is red and painful.   Amber Giang LPN..............2017 6:36 PM           Additional Notes: Patient consent was obtained to proceed with the visit and recommended plan of care after discussion of all risks and benefits, including the risks of COVID-19 exposure.

## 2022-07-31 ENCOUNTER — MYC MEDICAL ADVICE (OUTPATIENT)
Dept: INTERNAL MEDICINE | Facility: OTHER | Age: 67
End: 2022-07-31

## 2022-08-06 DIAGNOSIS — E78.5 HYPERLIPIDEMIA, UNSPECIFIED HYPERLIPIDEMIA TYPE: ICD-10-CM

## 2022-08-08 RX ORDER — ROSUVASTATIN CALCIUM 20 MG/1
TABLET, COATED ORAL
Qty: 90 TABLET | Refills: 0 | Status: SHIPPED | OUTPATIENT
Start: 2022-08-08 | End: 2022-09-14

## 2022-08-08 NOTE — TELEPHONE ENCOUNTER
"  Last Prescription Date: 5/12/22  Last Qty/Refills: 90 / 0  Last Office Visit: 6/4/22  Future Office Visit: 8/17/22     Requested Prescriptions   Pending Prescriptions Disp Refills     rosuvastatin (CRESTOR) 20 MG tablet [Pharmacy Med Name: ROSUVASTATIN CALCIUM 20 MG TAB] 90 tablet 0     Sig: TAKE 1 TABLET BY MOUTH EVERY DAY       Statins Protocol Failed - 8/6/2022  7:36 AM        Failed - LDL on file in past 12 months     Recent Labs   Lab Test 07/26/21  0842   LDL 74             Passed - No abnormal creatine kinase in past 12 months     No lab results found.             Passed - Recent (12 mo) or future (30 days) visit within the authorizing provider's specialty     Patient has had an office visit with the authorizing provider or a provider within the authorizing providers department within the previous 12 mos or has a future within next 30 days. See \"Patient Info\" tab in inbasket, or \"Choose Columns\" in Meds & Orders section of the refill encounter.              Passed - Medication is active on med list        Passed - Patient is age 18 or older             "

## 2022-08-14 ASSESSMENT — ENCOUNTER SYMPTOMS
SHORTNESS OF BREATH: 0
ABDOMINAL PAIN: 0
HEMATOCHEZIA: 0
HEMATURIA: 0
JOINT SWELLING: 0
ARTHRALGIAS: 0
DYSURIA: 0
FREQUENCY: 0
MYALGIAS: 1
NAUSEA: 0
HEARTBURN: 1
SORE THROAT: 1
WEAKNESS: 1
COUGH: 1
PARESTHESIAS: 0
PALPITATIONS: 0

## 2022-08-14 ASSESSMENT — ACTIVITIES OF DAILY LIVING (ADL): CURRENT_FUNCTION: NO ASSISTANCE NEEDED

## 2022-08-17 ENCOUNTER — OFFICE VISIT (OUTPATIENT)
Dept: INTERNAL MEDICINE | Facility: OTHER | Age: 67
End: 2022-08-17
Attending: INTERNAL MEDICINE
Payer: COMMERCIAL

## 2022-08-17 VITALS
OXYGEN SATURATION: 93 % | SYSTOLIC BLOOD PRESSURE: 150 MMHG | HEART RATE: 98 BPM | WEIGHT: 245 LBS | RESPIRATION RATE: 16 BRPM | DIASTOLIC BLOOD PRESSURE: 90 MMHG | HEIGHT: 66 IN | TEMPERATURE: 96.5 F | BODY MASS INDEX: 39.37 KG/M2

## 2022-08-17 DIAGNOSIS — I10 PRIMARY HYPERTENSION: ICD-10-CM

## 2022-08-17 DIAGNOSIS — R39.9 LOWER URINARY TRACT SYMPTOMS: ICD-10-CM

## 2022-08-17 DIAGNOSIS — L40.9 PSORIASIS: ICD-10-CM

## 2022-08-17 DIAGNOSIS — E66.01 MORBID OBESITY (H): ICD-10-CM

## 2022-08-17 DIAGNOSIS — I25.10 CORONARY ARTERY CALCIFICATION: ICD-10-CM

## 2022-08-17 DIAGNOSIS — E78.5 HYPERLIPIDEMIA, UNSPECIFIED HYPERLIPIDEMIA TYPE: Primary | ICD-10-CM

## 2022-08-17 DIAGNOSIS — R73.9 HYPERGLYCEMIA: ICD-10-CM
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
## 2022-08-17 PROBLEM — S30.861A TICK BITE OF ABDOMEN, INITIAL ENCOUNTER: Status: RESOLVED | Noted: 2022-06-04 | Resolved: 2022-08-17

## 2022-08-17 LAB
ALBUMIN SERPL-MCNC: 4.1 G/DL (ref 3.5–5.7)
ALP SERPL-CCNC: 96 U/L (ref 34–104)
ALT SERPL W P-5'-P-CCNC: 45 U/L (ref 7–52)
ANION GAP SERPL CALCULATED.3IONS-SCNC: 8 MMOL/L (ref 3–14)
AST SERPL W P-5'-P-CCNC: 34 U/L (ref 13–39)
BILIRUB SERPL-MCNC: 0.5 MG/DL (ref 0.3–1)
BUN SERPL-MCNC: 16 MG/DL (ref 7–25)
CALCIUM SERPL-MCNC: 9.3 MG/DL (ref 8.6–10.3)
CHLORIDE BLD-SCNC: 103 MMOL/L (ref 98–107)
CHOLEST SERPL-MCNC: 153 MG/DL
CO2 SERPL-SCNC: 26 MMOL/L (ref 21–31)
CREAT SERPL-MCNC: 0.92 MG/DL (ref 0.7–1.3)
ERYTHROCYTE [DISTWIDTH] IN BLOOD BY AUTOMATED COUNT: 13 % (ref 10–15)
FASTING STATUS PATIENT QL REPORTED: YES
GFR SERPL CREATININE-BSD FRML MDRD: >90 ML/MIN/1.73M2
GLUCOSE BLD-MCNC: 122 MG/DL (ref 70–105)
HBA1C MFR BLD: 6.8 % (ref 4–6.2)
HCT VFR BLD AUTO: 46.8 % (ref 40–53)
HDLC SERPL-MCNC: 48 MG/DL (ref 23–92)
HGB BLD-MCNC: 15.8 G/DL (ref 13.3–17.7)
LDLC SERPL CALC-MCNC: 83 MG/DL
MCH RBC QN AUTO: 31.3 PG (ref 26.5–33)
MCHC RBC AUTO-ENTMCNC: 33.8 G/DL (ref 31.5–36.5)
MCV RBC AUTO: 93 FL (ref 78–100)
NONHDLC SERPL-MCNC: 105 MG/DL
PLATELET # BLD AUTO: 189 10E3/UL (ref 150–450)
POTASSIUM BLD-SCNC: 4.1 MMOL/L (ref 3.5–5.1)
PROT SERPL-MCNC: 6.8 G/DL (ref 6.4–8.9)
RBC # BLD AUTO: 5.05 10E6/UL (ref 4.4–5.9)
SODIUM SERPL-SCNC: 137 MMOL/L (ref 134–144)
TRIGL SERPL-MCNC: 109 MG/DL
WBC # BLD AUTO: 7.8 10E3/UL (ref 4–11)

## 2022-08-17 PROCEDURE — 99213 OFFICE O/P EST LOW 20 MIN: CPT | Mod: 25 | Performed by: INTERNAL MEDICINE

## 2022-08-17 PROCEDURE — 80053 COMPREHEN METABOLIC PANEL: CPT | Mod: ZL | Performed by: INTERNAL MEDICINE

## 2022-08-17 PROCEDURE — 36415 COLL VENOUS BLD VENIPUNCTURE: CPT | Mod: ZL | Performed by: INTERNAL MEDICINE

## 2022-08-17 PROCEDURE — G0439 PPPS, SUBSEQ VISIT: HCPCS | Performed by: INTERNAL MEDICINE

## 2022-08-17 PROCEDURE — 80061 LIPID PANEL: CPT | Mod: ZL | Performed by: INTERNAL MEDICINE

## 2022-08-17 PROCEDURE — 85014 HEMATOCRIT: CPT | Mod: ZL | Performed by: INTERNAL MEDICINE

## 2022-08-17 PROCEDURE — 83036 HEMOGLOBIN GLYCOSYLATED A1C: CPT | Mod: ZL | Performed by: INTERNAL MEDICINE

## 2022-08-17 PROCEDURE — G0463 HOSPITAL OUTPT CLINIC VISIT: HCPCS

## 2022-08-17 RX ORDER — LOSARTAN POTASSIUM AND HYDROCHLOROTHIAZIDE 25; 100 MG/1; MG/1
1 TABLET ORAL DAILY
Qty: 90 TABLET | Refills: 3 | Status: SHIPPED | OUTPATIENT
Start: 2022-08-17 | End: 2023-09-28

## 2022-08-17 ASSESSMENT — ACTIVITIES OF DAILY LIVING (ADL): CURRENT_FUNCTION: NO ASSISTANCE NEEDED

## 2022-08-17 ASSESSMENT — PAIN SCALES - GENERAL: PAINLEVEL: NO PAIN (0)

## 2022-08-17 NOTE — PATIENT INSTRUCTIONS
I changed your blood pressure pill.  Stop the lisinopril and start Hyzaar 1 daily in its place.    Blood tests today, I will send you a MyChart letter with the results.    Work very hard on diet, exercise and weight loss.  Limit your sodium intake.    Look into getting the shingles vaccine.    Return in 4 weeks for a recheck on your blood pressure.

## 2022-08-17 NOTE — LETTER
August 17, 2022      Cecilio Nolan  [address]  [place] [address]        Dear Bartolo,    Below are the results of your recent labs:    Results for orders placed or performed in visit on 08/17/22   Hemoglobin A1c     Status: Abnormal   Result Value Ref Range    Hemoglobin A1C 6.8 (H) 4.0 - 6.2 %   CBC W PLT No Diff     Status: Normal   Result Value Ref Range    WBC Count 7.8 4.0 - 11.0 10e3/uL    RBC Count 5.05 4.40 - 5.90 10e6/uL    Hemoglobin 15.8 13.3 - 17.7 g/dL    Hematocrit 46.8 40.0 - 53.0 %    MCV 93 78 - 100 fL    MCH 31.3 26.5 - 33.0 pg    MCHC 33.8 31.5 - 36.5 g/dL    RDW 13.0 10.0 - 15.0 %    Platelet Count 189 150 - 450 10e3/uL   Lipid Panel     Status: None   Result Value Ref Range    Cholesterol 153 <200 mg/dL    Triglycerides 109 <150 mg/dL    Direct Measure HDL 48 23 - 92 mg/dL    LDL Cholesterol Calculated 83 <=100 mg/dL    Non HDL Cholesterol 105 <130 mg/dL    Patient Fasting > 8hrs? Yes     Narrative    Cholesterol  Desirable:  <200 mg/dL    Triglycerides  Normal:  Less than 150 mg/dL  Borderline High:  150-199 mg/dL  High:  200-499 mg/dL  Very High:  Greater than or equal to 500 mg/dL    Direct Measure HDL  Female:  Greater than or equal to 50 mg/dL   Male:  Greater than or equal to 40 mg/dL    LDL Cholesterol  Desirable:  <100mg/dL  Above Desirable:  100-129 mg/dL   Borderline High:  130-159 mg/dL   High:  160-189 mg/dL   Very High:  >= 190 mg/dL    Non HDL Cholesterol  Desirable:  130 mg/dL  Above Desirable:  130-159 mg/dL  Borderline High:  160-189 mg/dL  High:  190-219 mg/dL  Very High:  Greater than or equal to 220 mg/dL   Comprehensive metabolic panel     Status: Abnormal   Result Value Ref Range    Sodium 137 134 - 144 mmol/L    Potassium 4.1 3.5 - 5.1 mmol/L    Chloride 103 98 - 107 mmol/L    Carbon Dioxide (CO2) 26 21 - 31 mmol/L    Anion Gap 8 3 - 14 mmol/L    Urea Nitrogen 16 7 - 25 mg/dL    Creatinine 0.92 0.70 - 1.30 mg/dL    Calcium 9.3 8.6 - 10.3 mg/dL    Glucose 122 (H) 70 - 105  mg/dL    Alkaline Phosphatase 96 34 - 104 U/L    AST 34 13 - 39 U/L    ALT 45 7 - 52 U/L    Protein Total 6.8 6.4 - 8.9 g/dL    Albumin 4.1 3.5 - 5.7 g/dL    Bilirubin Total 0.5 0.3 - 1.0 mg/dL    GFR Estimate >90 >60 mL/min/1.73m2        Your blood tests look fine except for your diabetes test, the A1c, which continues to go higher.  If this goes any higher than it is currently, we will need to start you on treatment for your diabetes.  You absolutely must follow a healthy diet, increase your exercise and get your weight down.  We can discuss this further when you return in 1 month.    Sincerely,        Benjamin Linda MD  Internal Medicine  [facility]

## 2022-08-17 NOTE — PROGRESS NOTES
SUBJECTIVE:   Cecilio Nolan is a 67 year old male who presents for Preventive Visit.    He is in today for Medicare wellness visit.  He tells me that he feels fine and has no complaints or concerns.  I questioned him about his memory because he did not recall any items and failed the clock drawing, he tells me that he does not have any problems in daily life with his memory.  He has a history of psoriasis.  He is currently on Humira every 2 weeks and is doing very well with that.  His psoriasis is well controlled and he has no side effects from it.    He does have a history of hypertension and hyperlipidemia.  He has associated coronary artery calcification.  He does not check his blood pressure outside of the clinic, it is high today.  He tolerates his statin without difficulty.  He has not done anything to improve his weight, he is actually up about 5 pounds again from last year despite trying to do a little bit of walking.  Last year we did a stress test because of dyspnea on exertion, his stress test was normal.    In addition to his obesity, he has a history of hyperglycemia and is due for recheck of that.  He has urinary frequency with nocturia 2-3 times but he blames that on the fact that he does drink a lot of water.  PSA last year was normal.  Medications are reconciled.  Past medical history, past surgical history, family history and social histories are reviewed and updated.  Colonoscopy is up-to-date.  Immunizations are reviewed, I recommended that he get the Shingrix vaccine.  He tells me that he has a hard time affording that.      Patient has been advised of split billing requirements and indicates understanding: Yes  Are you in the first 12 months of your Medicare coverage?  No    Healthy Habits:     In general, how would you rate your overall health?  Fair    Frequency of exercise:  2-3 days/week    Duration of exercise:  15-30 minutes    Do you usually eat at least 4 servings of fruit and  "vegetables a day, include whole grains    & fiber and avoid regularly eating high fat or \"junk\" foods?  No    Taking medications regularly:  Yes    Medication side effects:  None    Ability to successfully perform activities of daily living:  No assistance needed    Home Safety:  No safety concerns identified    Hearing Impairment:  No hearing concerns    In the past 6 months, have you been bothered by leaking of urine?  No    In general, how would you rate your overall mental or emotional health?  Fair      PHQ-2 Total Score: 0    Additional concerns today:  No    Do you feel safe in your environment? Yes    Have you ever done Advance Care Planning? (For example, a Health Directive, POLST, or a discussion with a medical provider or your loved ones about your wishes): No, advance care planning information given to patient to review.  Patient declined advance care planning discussion at this time.       Fall risk  Fallen 2 or more times in the past year?: No  Any fall with injury in the past year?: No    Cognitive Screening   1) Repeat 3 items (Leader, Season, Table)    2) Clock draw: ABNORMAL Abnormal  3) 3 item recall: Recalls NO objects   Results: 0 items recalled: PROBABLE COGNITIVE IMPAIRMENT, **INFORM PROVIDER**    Mini-CogTM Copyright ELLY Landrum. Licensed by the author for use in [facility]; reprinted with permission (he@.[facility]). All rights reserved.      Do you have sleep apnea, excessive snoring or daytime drowsiness?: no    Reviewed and updated as needed this visit by clinical staff   Tobacco  Allergies  Meds  Problems  Med Hx  Surg Hx  Fam Hx            Reviewed and updated as needed this visit by Provider   Tobacco  Allergies  Meds  Problems  Med Hx  Surg Hx  Fam Hx           Social History     Tobacco Use     Smoking status: Former Smoker     Quit date: 1/15/2003     Years since quittin.6     Smokeless tobacco: Never Used   Substance Use Topics     Alcohol use: Yes     " "Alcohol/week: 2.0 standard drinks     Comment: Occasional     If you drink alcohol do you typically have >3 drinks per day or >7 drinks per week? No    Alcohol Use 8/17/2022   Prescreen: >3 drinks/day or >7 drinks/week? -   Prescreen: >3 drinks/day or >7 drinks/week? No           Current Outpatient Medications   Medication     adalimumab (HUMIRA PEN) 40 MG/0.8ML pen kit     albuterol (PROAIR HFA/PROVENTIL HFA/VENTOLIN HFA) 108 (90 Base) MCG/ACT inhaler     aspirin 81 MG tablet     lisinopril (ZESTRIL) 20 MG tablet     metoprolol succinate ER (TOPROL-XL) 50 MG 24 hr tablet     Multiple Vitamin (MULTI-VITAMINS) TABS     rosuvastatin (CRESTOR) 20 MG tablet     No current facility-administered medications for this visit.         Current providers sharing in care for this patient include:   Patient Care Team:  Benjamin Linda MD as PCP - General (Internal Medicine)  Benjamin Linda MD as Assigned PCP    The following health maintenance items are reviewed in Epic and correct as of today:  Health Maintenance Due   Topic Date Due     ZOSTER IMMUNIZATION (1 of 2) 05/12/2016     Lab work is in process          Review of Systems  Constitutional, HEENT, cardiovascular, pulmonary, GI, , musculoskeletal, neuro, skin, endocrine and psych systems are negative, except as otherwise noted.    OBJECTIVE:   BP (!) 150/90   Pulse 98   Temp (!) 96.5  F (35.8  C) (Temporal)   Resp 16   Ht 1.68 m (5' 6.14\")   Wt 111.1 kg (245 lb)   SpO2 93%   BMI 39.37 kg/m   Estimated body mass index is 39.37 kg/m  as calculated from the following:    Height as of this encounter: 1.68 m (5' 6.14\").    Weight as of this encounter: 111.1 kg (245 lb).  Physical Exam  GENERAL: healthy, alert and no distress  EYES: Eyes grossly normal to inspection, PERRL and conjunctivae and sclerae normal  HENT: ear canals and TM's normal, nose and mouth without ulcers or lesions  NECK: no adenopathy, no asymmetry, masses, or scars and thyroid normal to " "palpation  RESP: lungs clear to auscultation - no rales, rhonchi or wheezes  CV: regular rate and rhythm, normal S1 S2, no S3 or S4, no murmur, click or rub, peripheral pulses strong.  1-2+ pretibial edema  ABDOMEN: soft, nontender, no hepatosplenomegaly, no masses and bowel sounds normal  : Normal male, no hernia.  Prostate normal  MS: no gross musculoskeletal defects noted  SKIN: no suspicious lesions or rashes  NEURO: Normal strength and tone, mentation intact and speech normal  PSYCH: mentation appears normal, affect normal/bright        ASSESSMENT / PLAN:       ICD-10-CM    1. Hyperlipidemia, unspecified hyperlipidemia type  E78.5 Comprehensive metabolic panel     Lipid Panel   2. Hyperglycemia  R73.9 Hemoglobin A1c   3. Primary hypertension  I10    4. Coronary artery calcification  I25.10     I25.84    5. Psoriasis  L40.9 CBC W PLT No Diff   6. Lower urinary tract symptoms  R39.9    7. Morbid obesity (H)  E66.01        Patient has been advised of split billing requirements and indicates understanding: Yes    COUNSELING:    His exam today is remarkable for his weight, his blood pressure being elevated and his peripheral edema.  I elected to stop his lisinopril and start Hyzaar 1 daily.  Encouraged him to work very hard on diet, exercise and weight loss as well as sodium restriction.  Complete lab pending, I will send him a letter with the results.  Encouraged him to look into getting the Shingrix vaccine.  I want him to return in 4 weeks for recheck on his blood pressure as well as his peripheral edema.  We will likely need to do a basic metabolic panel at that visit.    Reviewed preventive health counseling, as reflected in patient instructions       Regular exercise       Healthy diet/nutrition    Estimated body mass index is 39.37 kg/m  as calculated from the following:    Height as of this encounter: 1.68 m (5' 6.14\").    Weight as of this encounter: 111.1 kg (245 lb).    Weight management plan: Discussed " healthy diet and exercise guidelines    He reports that he quit smoking about 19 years ago. He has never used smokeless tobacco.      Appropriate preventive services were discussed with this patient, including applicable screening as appropriate for cardiovascular disease, diabetes, osteopenia/osteoporosis, and glaucoma.  As appropriate for age/gender, discussed screening for colorectal cancer, prostate cancer, breast cancer, and cervical cancer. Checklist reviewing preventive services available has been given to the patient.    Reviewed patients plan of care and provided an AVS. The Basic Care Plan (routine screening as documented in Health Maintenance) for Cecilio meets the Care Plan requirement. This Care Plan has been established and reviewed with the Patient.    Counseling Resources:  ATP IV Guidelines  Pooled Cohorts Equation Calculator  Breast Cancer Risk Calculator  Breast Cancer: Medication to Reduce Risk  FRAX Risk Assessment  ICSI Preventive Guidelines  Dietary Guidelines for Americans, 2010  USDA's MyPlate  ASA Prophylaxis  Lung CA Screening    ROSARIO ALDRICH MD  [facility]    Identified Health Risks:

## 2022-09-14 ENCOUNTER — OFFICE VISIT (OUTPATIENT)
Dept: INTERNAL MEDICINE | Facility: OTHER | Age: 67
End: 2022-09-14
Attending: INTERNAL MEDICINE
Payer: COMMERCIAL

## 2022-09-14 VITALS
WEIGHT: 242.8 LBS | RESPIRATION RATE: 18 BRPM | OXYGEN SATURATION: 94 % | BODY MASS INDEX: 39.02 KG/M2 | DIASTOLIC BLOOD PRESSURE: 82 MMHG | HEART RATE: 84 BPM | SYSTOLIC BLOOD PRESSURE: 118 MMHG | TEMPERATURE: 96.4 F

## 2022-09-14 DIAGNOSIS — R73.9 HYPERGLYCEMIA: ICD-10-CM

## 2022-09-14 DIAGNOSIS — E78.5 HYPERLIPIDEMIA, UNSPECIFIED HYPERLIPIDEMIA TYPE: ICD-10-CM

## 2022-09-14 DIAGNOSIS — I10 ESSENTIAL HYPERTENSION: Primary | ICD-10-CM

## 2022-09-14 LAB
ANION GAP SERPL CALCULATED.3IONS-SCNC: 8 MMOL/L (ref 3–14)
BUN SERPL-MCNC: 19 MG/DL (ref 7–25)
CALCIUM SERPL-MCNC: 9.6 MG/DL (ref 8.6–10.3)
CHLORIDE BLD-SCNC: 99 MMOL/L (ref 98–107)
CO2 SERPL-SCNC: 29 MMOL/L (ref 21–31)
CREAT SERPL-MCNC: 0.9 MG/DL (ref 0.7–1.3)
GFR SERPL CREATININE-BSD FRML MDRD: >90 ML/MIN/1.73M2
GLUCOSE BLD-MCNC: 124 MG/DL (ref 70–105)
HOLD SPECIMEN: NORMAL
POTASSIUM BLD-SCNC: 3.5 MMOL/L (ref 3.5–5.1)
SODIUM SERPL-SCNC: 136 MMOL/L (ref 134–144)

## 2022-09-14 PROCEDURE — G0463 HOSPITAL OUTPT CLINIC VISIT: HCPCS

## 2022-09-14 PROCEDURE — 36415 COLL VENOUS BLD VENIPUNCTURE: CPT | Mod: ZL | Performed by: INTERNAL MEDICINE

## 2022-09-14 PROCEDURE — 99213 OFFICE O/P EST LOW 20 MIN: CPT | Performed by: INTERNAL MEDICINE

## 2022-09-14 PROCEDURE — 80048 BASIC METABOLIC PNL TOTAL CA: CPT | Mod: ZL | Performed by: INTERNAL MEDICINE

## 2022-09-14 RX ORDER — ROSUVASTATIN CALCIUM 20 MG/1
20 TABLET, COATED ORAL DAILY
Qty: 90 TABLET | Refills: 3 | Status: SHIPPED | OUTPATIENT
Start: 2022-09-14 | End: 2023-09-28

## 2022-09-14 RX ORDER — METOPROLOL SUCCINATE 50 MG/1
50 TABLET, EXTENDED RELEASE ORAL DAILY
Qty: 90 TABLET | Refills: 3 | Status: SHIPPED | OUTPATIENT
Start: 2022-09-14 | End: 2023-09-28

## 2022-09-14 ASSESSMENT — ENCOUNTER SYMPTOMS
CARDIOVASCULAR NEGATIVE: 1
RESPIRATORY NEGATIVE: 1

## 2022-09-14 ASSESSMENT — PATIENT HEALTH QUESTIONNAIRE - PHQ9: SUM OF ALL RESPONSES TO PHQ QUESTIONS 1-9: 0

## 2022-09-14 ASSESSMENT — PAIN SCALES - GENERAL: PAINLEVEL: NO PAIN (0)

## 2022-09-14 NOTE — LETTER
September 14, 2022      Cecilio Nolan  [address]  [place] [address]        Dear Bartolo,    Below are the results of your recent labs:    Results for orders placed or performed in visit on 09/14/22   Basic Metabolic Panel     Status: Abnormal   Result Value Ref Range    Sodium 136 134 - 144 mmol/L    Potassium 3.5 3.5 - 5.1 mmol/L    Chloride 99 98 - 107 mmol/L    Carbon Dioxide (CO2) 29 21 - 31 mmol/L    Anion Gap 8 3 - 14 mmol/L    Urea Nitrogen 19 7 - 25 mg/dL    Creatinine 0.90 0.70 - 1.30 mg/dL    Calcium 9.6 8.6 - 10.3 mg/dL    Glucose 124 (H) 70 - 105 mg/dL    GFR Estimate >90 >60 mL/min/1.73m2   Extra Tube     Status: None    Narrative    The following orders were created for panel order Extra Tube.  Procedure                               Abnormality         Status                     ---------                               -----------         ------                     Extra Serum Separator Tu...[[id number]]                      Final result                 Please view results for these tests on the individual orders.   Extra Serum Separator Tube (SST)     Status: None   Result Value Ref Range    Hold Specimen          Your recent blood test looks fine.    Sincerely,        Benjamin Linda MD  Internal Medicine  [facility]

## 2022-09-14 NOTE — PROGRESS NOTES
ICD-10-CM    1. Essential hypertension  I10 metoprolol succinate ER (TOPROL XL) 50 MG 24 hr tablet     Basic Metabolic Panel   2. Hyperglycemia  R73.9    3. Hyperlipidemia, unspecified hyperlipidemia type  E78.5 rosuvastatin (CRESTOR) 20 MG tablet     His blood pressure is now well controlled but his edema is still somewhat persistent, likely related to his diet and weight.  Encouraged continued efforts at exercise, healthy diet and weight loss.  Basic metabolic panel pending, I will send him a letter with the results.  Recheck in 3 months on his blood pressure, weight, peripheral edema and diabetes.      Paco Mcfarland is a 67 year old, presenting for the following health issues:  Follow Up      He was here for a physical 1 month ago.  His weight was high and his blood pressure was high.  He also had peripheral edema and his A1c was up to 6.8%.  I encouraged diet and exercise with weight loss and I changed his lisinopril to Hyzaar 1 daily.  He is here today for follow-up.  No issues with the new medication.  He tells me that he is trying to work hard on diet and weight loss.    History of Present Illness       Hypertension: He presents for follow up of hypertension.  He does not check blood pressure  regularly outside of the clinic. Outpatient blood pressures have not been over 140/90. He follows a low salt diet.           Current Outpatient Medications   Medication     adalimumab (HUMIRA PEN) 40 MG/0.8ML pen kit     albuterol (PROAIR HFA/PROVENTIL HFA/VENTOLIN HFA) 108 (90 Base) MCG/ACT inhaler     aspirin 81 MG tablet     losartan-hydrochlorothiazide (HYZAAR) 100-25 MG tablet     metoprolol succinate ER (TOPROL XL) 50 MG 24 hr tablet     Multiple Vitamin (MULTI-VITAMINS) TABS     rosuvastatin (CRESTOR) 20 MG tablet     No current facility-administered medications for this visit.         Review of Systems   Respiratory: Negative.    Cardiovascular: Negative.             Objective    /82   Pulse 84    Temp (!) 96.4  F (35.8  C) (Temporal)   Resp 18   Wt 110.1 kg (242 lb 12.8 oz)   SpO2 94%   BMI 39.02 kg/m    Body mass index is 39.02 kg/m .  Physical Exam  Vitals and nursing note reviewed.   Constitutional:       General: He is not in acute distress.     Appearance: Normal appearance. He is not ill-appearing, toxic-appearing or diaphoretic.   Musculoskeletal:      Right lower leg: Edema present.      Left lower leg: Edema present.      Comments: Persistent 1-2+ pretibial edema   Neurological:      Mental Status: He is alert.

## 2022-09-14 NOTE — NURSING NOTE
"Chief Complaint   Patient presents with     Follow Up       Initial /82   Pulse 84   Temp (!) 96.4  F (35.8  C) (Temporal)   Resp 18   Wt 110.1 kg (242 lb 12.8 oz)   SpO2 94%   BMI 39.02 kg/m   Estimated body mass index is 39.02 kg/m  as calculated from the following:    Height as of 8/17/22: 1.68 m (5' 6.14\").    Weight as of this encounter: 110.1 kg (242 lb 12.8 oz).  FOOD SECURITY SCREENING QUESTIONS  Hunger Vital Signs:  Within the past 12 months we worried whether our food would run out before we got money to buy more. Never  Within the past 12 months the food we bought just didn't last and we didn't have money to get more. Never        Denver Pitts, LPN    "

## 2022-09-17 ENCOUNTER — HEALTH MAINTENANCE LETTER (OUTPATIENT)
Age: 67
End: 2022-09-17

## 2022-12-14 ENCOUNTER — OFFICE VISIT (OUTPATIENT)
Dept: INTERNAL MEDICINE | Facility: OTHER | Age: 67
End: 2022-12-14
Attending: INTERNAL MEDICINE
Payer: COMMERCIAL

## 2022-12-14 VITALS
HEIGHT: 66 IN | WEIGHT: 243.8 LBS | TEMPERATURE: 97.6 F | DIASTOLIC BLOOD PRESSURE: 89 MMHG | OXYGEN SATURATION: 98 % | HEART RATE: 93 BPM | SYSTOLIC BLOOD PRESSURE: 138 MMHG | RESPIRATION RATE: 20 BRPM | BODY MASS INDEX: 39.18 KG/M2

## 2022-12-14 DIAGNOSIS — I10 ESSENTIAL HYPERTENSION: ICD-10-CM

## 2022-12-14 DIAGNOSIS — E78.5 HYPERLIPIDEMIA, UNSPECIFIED HYPERLIPIDEMIA TYPE: ICD-10-CM

## 2022-12-14 DIAGNOSIS — R73.9 HYPERGLYCEMIA: Primary | ICD-10-CM

## 2022-12-14 LAB — HBA1C MFR BLD: 7 % (ref 4–6.2)

## 2022-12-14 PROCEDURE — 83036 HEMOGLOBIN GLYCOSYLATED A1C: CPT | Mod: ZL | Performed by: INTERNAL MEDICINE

## 2022-12-14 PROCEDURE — G0463 HOSPITAL OUTPT CLINIC VISIT: HCPCS

## 2022-12-14 PROCEDURE — 99213 OFFICE O/P EST LOW 20 MIN: CPT | Performed by: INTERNAL MEDICINE

## 2022-12-14 PROCEDURE — 36415 COLL VENOUS BLD VENIPUNCTURE: CPT | Mod: ZL | Performed by: INTERNAL MEDICINE

## 2022-12-14 ASSESSMENT — ENCOUNTER SYMPTOMS
CONSTITUTIONAL NEGATIVE: 1
RESPIRATORY NEGATIVE: 1
CARDIOVASCULAR NEGATIVE: 1

## 2022-12-14 ASSESSMENT — PAIN SCALES - GENERAL: PAINLEVEL: NO PAIN (0)

## 2022-12-14 NOTE — LETTER
December 14, 2022      Cecilio Nolan  [address]  [place] [address]        Dear Bartolo,    Below are the results of your recent labs:    Results for orders placed or performed in visit on 12/14/22   Hemoglobin A1c     Status: Abnormal   Result Value Ref Range    Hemoglobin A1C 7.0 (H) 4.0 - 6.2 %        Your A1c has gone even higher.  You are now definitely diabetic and you do need treatment.  I have sent a prescription to your pharmacy to take 1 daily.  I want you to return in 3 months for a recheck.  You need to work very hard on a healthy diet, exercise and weight loss in the interim.    Sincerely,        Benjamin Linda MD  Internal Medicine  [facility]

## 2022-12-14 NOTE — NURSING NOTE
"Chief Complaint   Patient presents with     RECHECK     3-month follow up       Initial /89 (BP Location: Right arm, Patient Position: Sitting, Cuff Size: Adult Large)   Pulse 93   Temp 97.6  F (36.4  C) (Tympanic)   Resp 20   Ht 1.68 m (5' 6.14\")   Wt 110.6 kg (243 lb 12.8 oz)   SpO2 98%   BMI 39.18 kg/m   Estimated body mass index is 39.18 kg/m  as calculated from the following:    Height as of this encounter: 1.68 m (5' 6.14\").    Weight as of this encounter: 110.6 kg (243 lb 12.8 oz).      Medication Reconciliation: complete    FOOD SECURITY SCREENING QUESTIONS:      The next two questions are to help us understand your food security.  If you are feeling you need any assistance in this area, we have resources available to support you today.    Hunger Vital Signs:  Within the past 12 months we worried whether our food would run out before we got money to buy more. Never  Within the past 12 months the food we bought just didn't last and we didn't have money to get more. Never    Carla Doe LPN....................  12/14/2022   7:50 AM    "

## 2022-12-14 NOTE — PROGRESS NOTES
ICD-10-CM    1. Hyperglycemia  R73.9 Hemoglobin A1c      2. Essential hypertension  I10       3. Hyperlipidemia, unspecified hyperlipidemia type  E78.5         Once again encouraged him to work on healthy diet and weight loss.  Medications will continue without change.  Lab is pending in the form of an A1c, I will send him a MyChart letter with the results including any recommendations of treatment as indicated and when he needs to follow-up.    His A1c has now gone up to 7.  I have started him on metformin 500 mg daily and have recommended a 3-month follow-up.      Paco Mcfarland is a 67 year old accompanied by his self, presenting for the following health issues:  RECHECK (3-month follow up)      He is here for follow-up.  He has obesity.  This has resulted in worsening control of his blood pressure so we had to change blood pressure medications.  I added a diuretic to his blood pressure medications because he had peripheral edema.  The edema has improved and his blood pressure is reasonably controlled.  His A1c has been high and he is approaching where he needs to start treatment for diabetes.  He currently has no complaints or concerns other than the fact that he continues to struggle with his weight.    History of Present Illness       Hypertension: He presents for follow up of hypertension.  He does not check blood pressure  regularly outside of the clinic. Outpatient blood pressures have not been over 140/90. He does not follow a low salt diet.     Vascular Disease:  He presents for follow up of vascular disease.  He never takes nitroglycerin. He takes daily aspirin.          Current Outpatient Medications   Medication     adalimumab (HUMIRA PEN) 40 MG/0.8ML pen kit     albuterol (PROAIR HFA/PROVENTIL HFA/VENTOLIN HFA) 108 (90 Base) MCG/ACT inhaler     aspirin 81 MG tablet     losartan-hydrochlorothiazide (HYZAAR) 100-25 MG tablet     metoprolol succinate ER (TOPROL XL) 50 MG 24 hr tablet     Multiple  "Vitamin (MULTI-VITAMINS) TABS     rosuvastatin (CRESTOR) 20 MG tablet     No current facility-administered medications for this visit.         Review of Systems   Constitutional: Negative.    Respiratory: Negative.    Cardiovascular: Negative.             Objective    /89 (BP Location: Right arm, Patient Position: Sitting, Cuff Size: Adult Large)   Pulse 93   Temp 97.6  F (36.4  C) (Tympanic)   Resp 20   Ht 1.68 m (5' 6.14\")   Wt 110.6 kg (243 lb 12.8 oz)   SpO2 98%   BMI 39.18 kg/m    Body mass index is 39.18 kg/m .  Physical Exam  Vitals and nursing note reviewed.   Constitutional:       General: He is not in acute distress.     Appearance: Normal appearance. He is not ill-appearing, toxic-appearing or diaphoretic.   Neurological:      Mental Status: He is alert.                            "

## 2023-02-24 ENCOUNTER — OFFICE VISIT (OUTPATIENT)
Dept: INTERNAL MEDICINE | Facility: OTHER | Age: 68
End: 2023-02-24
Attending: INTERNAL MEDICINE
Payer: COMMERCIAL

## 2023-02-24 VITALS
TEMPERATURE: 97.6 F | DIASTOLIC BLOOD PRESSURE: 74 MMHG | RESPIRATION RATE: 16 BRPM | WEIGHT: 245 LBS | OXYGEN SATURATION: 91 % | HEART RATE: 95 BPM | BODY MASS INDEX: 39.37 KG/M2 | SYSTOLIC BLOOD PRESSURE: 122 MMHG

## 2023-02-24 DIAGNOSIS — E11.9 TYPE 2 DIABETES MELLITUS WITHOUT COMPLICATION, WITHOUT LONG-TERM CURRENT USE OF INSULIN (H): Primary | ICD-10-CM

## 2023-02-24 DIAGNOSIS — R06.02 SHORTNESS OF BREATH: ICD-10-CM

## 2023-02-24 PROCEDURE — 99213 OFFICE O/P EST LOW 20 MIN: CPT | Performed by: STUDENT IN AN ORGANIZED HEALTH CARE EDUCATION/TRAINING PROGRAM

## 2023-02-24 PROCEDURE — 93010 ELECTROCARDIOGRAM REPORT: CPT | Performed by: STUDENT IN AN ORGANIZED HEALTH CARE EDUCATION/TRAINING PROGRAM

## 2023-02-24 PROCEDURE — G0463 HOSPITAL OUTPT CLINIC VISIT: HCPCS

## 2023-02-24 PROCEDURE — 93005 ELECTROCARDIOGRAM TRACING: CPT | Performed by: STUDENT IN AN ORGANIZED HEALTH CARE EDUCATION/TRAINING PROGRAM

## 2023-02-24 ASSESSMENT — PAIN SCALES - GENERAL: PAINLEVEL: MODERATE PAIN (5)

## 2023-02-24 NOTE — PROGRESS NOTES
Assessment & Plan         ICD-10-CM    1. Type 2 diabetes mellitus without complication, without long-term current use of insulin (H)  E11.9 Hemoglobin A1c     Basic Metabolic Panel      2. Shortness of breath  R06.02         Type 2 diabetes: He is on metformin once daily.  Most recent hemoglobin A1c was 7.0 in December.  I have placed a standing lab order for him to have his labs drawn mid March and will contact him with results.  Likely can continue metformin on his own as he has made lifestyle changes.    Shortness of breath: EKG is negative for acute ischemia or arrhythmia.  Patient thinks that it may be due to a recent cold. Discussed performing repeat stress testing (normal in 2021) versus zio patch versus monitoring at home.  He would like to continue monitoring  symptoms at home.  He will let me know if he is having chest pain or worsening shortness of breath.      Follow-up with me in the summer for diabetes recheck, sooner if needed      No follow-ups on file.    Tony Sow MD  [facility]      Subjective   Bartolo is a 68 year old, presenting for the following health issues:  RECHECK (Regarding shortness of breath  and est care)      History of Present Illness     Asthma:  He presents for follow up of asthma.  He has some cough, some wheezing, and some shortness of breath. He is using a relief medication a few times a month. He does not have a controller medication. Patient is aware of the following triggers: none and cold air. The patient has not had a visit to the Emergency Room, Urgent Care or Hospital due to asthma since the last clinic visit.     He eats 0-1 servings of fruits and vegetables daily.He consumes 0 sweetened beverage(s) daily.He exercises with enough effort to increase his heart rate 9 or less minutes per day.  He exercises with enough effort to increase his heart rate 4 days per week.   He is taking medications regularly.       Est care and shortness of breath at  times     Foot with occasional shooting pain.  Started 3-4 days ago.   Left foot.   No trauma.     No neuropathy from DM.       Occasional coughing.   Shortness of breath with coughing.   Heart will race at times.   Rarely happens.  Inhaler seemed to help  Feels like he is out of shape.   4 brothers with heart problems.  No chest pain.    On humira for psoriasis.     Stress test negative in 2021. Concern for exercise induced asthma.           Review of Systems         Objective    /74 (BP Location: Right arm, Patient Position: Sitting, Cuff Size: Adult Large)   Pulse 95   Temp 97.6  F (36.4  C) (Temporal)   Resp 16   Wt 111.1 kg (245 lb)   SpO2 91%   BMI 39.37 kg/m    Body mass index is 39.37 kg/m .  Physical Exam   General: Pleasant 68-year-old man sitting clinic no acute distress  Pulmonary: Clear to auscultation no crackles rales or wheezes  CV: Regular rate and rhythm no peripheral edema appreciated

## 2023-02-24 NOTE — NURSING NOTE
"Chief Complaint   Patient presents with     RECHECK     Regarding shortness of breath  and est care       Medication reconciliation completed.    FOOD SECURITY SCREENING QUESTIONS:    The next two questions are to help us understand your food security.  If you are feeling you need any assistance in this area, we have resources available to support you today.    Hunger Vital Signs:  Within the past 12 months we worried whether our food would run out before we got money to buy more. Never  Within the past 12 months the food we bought just didn't last and we didn't have money to get more. Never    Initial /74 (BP Location: Right arm, Patient Position: Sitting, Cuff Size: Adult Large)   Pulse 95   Temp 97.6  F (36.4  C) (Temporal)   Resp 16   Wt 111.1 kg (245 lb)   SpO2 91%   BMI 39.37 kg/m   Estimated body mass index is 39.37 kg/m  as calculated from the following:    Height as of 12/14/22: 1.68 m (5' 6.14\").    Weight as of this encounter: 111.1 kg (245 lb).       Keisha Gee LPN .......  2/24/2023  3:42 PM  "

## 2023-02-27 LAB
ATRIAL RATE - MUSE: 79 BPM
DIASTOLIC BLOOD PRESSURE - MUSE: NORMAL MMHG
INTERPRETATION ECG - MUSE: NORMAL
P AXIS - MUSE: 66 DEGREES
PR INTERVAL - MUSE: 176 MS
QRS DURATION - MUSE: 110 MS
QT - MUSE: 382 MS
QTC - MUSE: 438 MS
R AXIS - MUSE: 46 DEGREES
SYSTOLIC BLOOD PRESSURE - MUSE: NORMAL MMHG
T AXIS - MUSE: 19 DEGREES
VENTRICULAR RATE- MUSE: 79 BPM

## 2023-03-24 ENCOUNTER — OFFICE VISIT (OUTPATIENT)
Dept: FAMILY MEDICINE | Facility: OTHER | Age: 68
End: 2023-03-24
Attending: NURSE PRACTITIONER
Payer: COMMERCIAL

## 2023-03-24 VITALS
BODY MASS INDEX: 39.52 KG/M2 | WEIGHT: 245.9 LBS | RESPIRATION RATE: 20 BRPM | OXYGEN SATURATION: 92 % | HEART RATE: 76 BPM | DIASTOLIC BLOOD PRESSURE: 86 MMHG | SYSTOLIC BLOOD PRESSURE: 138 MMHG | TEMPERATURE: 96.3 F

## 2023-03-24 DIAGNOSIS — R10.9 LEFT LATERAL ABDOMINAL PAIN: ICD-10-CM

## 2023-03-24 DIAGNOSIS — S76.212A STRAIN OF GROIN, LEFT, INITIAL ENCOUNTER: Primary | ICD-10-CM

## 2023-03-24 LAB
ALBUMIN UR-MCNC: NEGATIVE MG/DL
APPEARANCE UR: CLEAR
BASOPHILS # BLD AUTO: 0 10E3/UL (ref 0–0.2)
BASOPHILS NFR BLD AUTO: 0 %
BILIRUB UR QL STRIP: NEGATIVE
COLOR UR AUTO: NORMAL
CRP SERPL-MCNC: <3 MG/L
EOSINOPHIL # BLD AUTO: 0.2 10E3/UL (ref 0–0.7)
EOSINOPHIL NFR BLD AUTO: 2 %
ERYTHROCYTE [DISTWIDTH] IN BLOOD BY AUTOMATED COUNT: 13.1 % (ref 10–15)
GLUCOSE UR STRIP-MCNC: NEGATIVE MG/DL
HCT VFR BLD AUTO: 46.8 % (ref 40–53)
HGB BLD-MCNC: 15.7 G/DL (ref 13.3–17.7)
HGB UR QL STRIP: NEGATIVE
IMM GRANULOCYTES # BLD: 0 10E3/UL
IMM GRANULOCYTES NFR BLD: 0 %
KETONES UR STRIP-MCNC: NEGATIVE MG/DL
LEUKOCYTE ESTERASE UR QL STRIP: NEGATIVE
LYMPHOCYTES # BLD AUTO: 2.6 10E3/UL (ref 0.8–5.3)
LYMPHOCYTES NFR BLD AUTO: 29 %
MCH RBC QN AUTO: 31.4 PG (ref 26.5–33)
MCHC RBC AUTO-ENTMCNC: 33.5 G/DL (ref 31.5–36.5)
MCV RBC AUTO: 94 FL (ref 78–100)
MONOCYTES # BLD AUTO: 1.3 10E3/UL (ref 0–1.3)
MONOCYTES NFR BLD AUTO: 15 %
NEUTROPHILS # BLD AUTO: 4.7 10E3/UL (ref 1.6–8.3)
NEUTROPHILS NFR BLD AUTO: 54 %
NITRATE UR QL: NEGATIVE
NRBC # BLD AUTO: 0 10E3/UL
NRBC BLD AUTO-RTO: 0 /100
PH UR STRIP: 5 [PH] (ref 5–9)
PLATELET # BLD AUTO: 202 10E3/UL (ref 150–450)
RBC # BLD AUTO: 5 10E6/UL (ref 4.4–5.9)
SP GR UR STRIP: 1.02 (ref 1–1.03)
UROBILINOGEN UR STRIP-MCNC: NORMAL MG/DL
WBC # BLD AUTO: 8.9 10E3/UL (ref 4–11)

## 2023-03-24 PROCEDURE — 81003 URINALYSIS AUTO W/O SCOPE: CPT | Mod: ZL | Performed by: NURSE PRACTITIONER

## 2023-03-24 PROCEDURE — 36415 COLL VENOUS BLD VENIPUNCTURE: CPT | Mod: ZL | Performed by: NURSE PRACTITIONER

## 2023-03-24 PROCEDURE — 99213 OFFICE O/P EST LOW 20 MIN: CPT | Performed by: NURSE PRACTITIONER

## 2023-03-24 PROCEDURE — G0463 HOSPITAL OUTPT CLINIC VISIT: HCPCS

## 2023-03-24 PROCEDURE — 86140 C-REACTIVE PROTEIN: CPT | Mod: ZL | Performed by: NURSE PRACTITIONER

## 2023-03-24 PROCEDURE — 85025 COMPLETE CBC W/AUTO DIFF WBC: CPT | Mod: ZL | Performed by: NURSE PRACTITIONER

## 2023-03-24 ASSESSMENT — PAIN SCALES - GENERAL: PAINLEVEL: EXTREME PAIN (8)

## 2023-03-24 NOTE — PROGRESS NOTES
ASSESSMENT/PLAN:     I have reviewed the nursing notes.  I have reviewed the findings, diagnosis, plan and need for follow up with the patient.        1. Left lateral abdominal pain    - CBC and Differential  - CRP inflammation  - UA reflex to Microscopic and Culture    2. Strain of groin, left, initial encounter    Acute onset of left lateral lower abdominal, groin, and hip pain today without fall or injury.  Initially suspected possible infection etiology, however normal CBC, normal CRP, and normal urinalysis.   After evaluation today, more likely strain from shoveling snow and activity.  No palpable tenderness on exam.  No fevers or systemic symptoms.  No current pain during visit and exam.    Recommend symptomatic treatment at this time with Tylenol, Aspirin, ice, heat, and topical pain reliever.    Discussed warning signs/symptoms indicative of need to f/u including, fevers, vomiting, worsening pain, etc   Follow up if symptoms persist or worsen or concerns      I explained my diagnostic considerations and recommendations to the patient, who voiced understanding and agreement with the treatment plan. All questions were answered. We discussed potential side effects of any prescribed or recommended therapies, as well as expectations for response to treatments.    Tania Cabello NP  [facility]      SUBJECTIVE:   Cecilio Nolan is a 68 year old male who presents to clinic today for the following health issues:  Left lower side pain    HPI  Left sided abdominal pain started this morning.  Pain is diffuse along the left lateral lower area of abdomen, groin, and hip.  Pain has been sharp, currently no pain.  Pain was radiating down his left leg.  No back pain.  Denies falls or injury.   Shoveling snow.  No hematuria, frequency, or dysuria.  No fevers, chills, or sweats.    No nausea or vomiting.  No diarrhea or constipated.  Last bowel movement this morning, formed, no blood.   Patient has  not tried any OTC medications today for pain.  No heat or ice tried.      Past Medical History:   Diagnosis Date     Adenomatous colon polyp     F/U due in      Coronary artery calcification      Essential (primary) hypertension     No Comments Provided     Hyperlipidemia     No Comments Provided     Obesity 2018    Overview:  BMI: 34     Psoriasis     No Comments Provided     Transaminitis     negative liver biopsy     Past Surgical History:   Procedure Laterality Date     COLONOSCOPY  2010    WNL Q10yrs.     COLONOSCOPY N/A 2020    Procedure: COLONOSCOPY, WITH POLYPECTOMY AND BIOPSY;  Surgeon: Eliazar Oakes MD;  Location: [facility] OR     HERNIA REPAIR Right 2009     LIVER BIOPSY       Social History     Tobacco Use     Smoking status: Former     Types: Cigarettes     Quit date: 1/15/2003     Years since quittin.2     Smokeless tobacco: Never   Substance Use Topics     Alcohol use: Yes     Alcohol/week: 2.0 standard drinks     Comment: Occasional     Current Outpatient Medications   Medication Sig Dispense Refill     adalimumab (HUMIRA PEN) 40 MG/0.8ML pen kit Inject 0.8 mLs (40 mg) Subcutaneous every 14 days 1 each 11     albuterol (PROAIR HFA/PROVENTIL HFA/VENTOLIN HFA) 108 (90 Base) MCG/ACT inhaler Inhale 2 puffs into the lungs every 4 hours as needed for shortness of breath / dyspnea or wheezing 18 g 3     aspirin 81 MG tablet Take 81 mg by mouth daily with food       losartan-hydrochlorothiazide (HYZAAR) 100-25 MG tablet Take 1 tablet by mouth daily 90 tablet 3     metFORMIN (GLUCOPHAGE) 500 MG tablet Take 1 tablet (500 mg) by mouth daily (with breakfast) 90 tablet 3     metoprolol succinate ER (TOPROL XL) 50 MG 24 hr tablet Take 1 tablet (50 mg) by mouth daily 90 tablet 3     Multiple Vitamin (MULTI-VITAMINS) TABS Take 1 tablet by mouth daily       rosuvastatin (CRESTOR) 20 MG tablet Take 1 tablet (20 mg) by mouth daily 90 tablet 3     No Known Allergies      Past medical history,  past surgical history, current medications and allergies reviewed and accurate to the best of my knowledge.        OBJECTIVE:     /86 (BP Location: Right arm, Patient Position: Sitting, Cuff Size: Adult Large)   Pulse 76   Temp (!) 96.3  F (35.7  C) (Tympanic)   Resp 20   Wt 111.5 kg (245 lb 14.4 oz)   SpO2 92%   BMI 39.52 kg/m    Body mass index is 39.52 kg/m .     Physical Exam  General Appearance: Well appearing adult male, non ill appearance, appropriate appearance for age. No acute distress  Respiratory: normal chest wall and respirations.  Normal effort.  Clear to auscultation bilaterally, no wheezing, crackles or rhonchi.  No increased work of breathing.  No cough appreciated.  Cardiac: RRR with no murmurs  Abdomen: soft, nontender, no rigidity, no rebound tenderness or guarding, normal bowel sounds present  :  No suprapubic tenderness to palpation.  No CVA or flank  tenderness to palpation.    Musculoskeletal:  Equal movement of bilateral upper extremities.  Equal movement of bilateral lower extremities.  Normal ROM of left hip without producible pain.  Normal gait. Changes positions without difficulty.  Able to get on and off exam table without difficulty.    Dermatological:  Abdomen and lower back without noted rash, erythema, or bruising   Psychological: normal affect, alert, oriented, and pleasant.       Labs:  Results for orders placed or performed in visit on 03/24/23   CRP inflammation     Status: Normal   Result Value Ref Range    CRP Inflammation <3.00 <5.00 mg/L   UA reflex to Microscopic and Culture     Status: Normal    Specimen: Urine, Midstream   Result Value Ref Range    Color Urine Light Yellow Colorless, Straw, Light Yellow, Yellow    Appearance Urine Clear Clear    Glucose Urine Negative Negative mg/dL    Bilirubin Urine Negative Negative    Ketones Urine Negative Negative mg/dL    Specific Gravity Urine 1.018 1.000 - 1.030    Blood Urine Negative Negative    pH Urine 5.0 5.0  - 9.0    Protein Albumin Urine Negative Negative mg/dL    Urobilinogen Urine Normal Normal, 2.0 mg/dL    Nitrite Urine Negative Negative    Leukocyte Esterase Urine Negative Negative    Narrative    Microscopic not indicated   CBC with platelets and differential     Status: None   Result Value Ref Range    WBC Count 8.9 4.0 - 11.0 10e3/uL    RBC Count 5.00 4.40 - 5.90 10e6/uL    Hemoglobin 15.7 13.3 - 17.7 g/dL    Hematocrit 46.8 40.0 - 53.0 %    MCV 94 78 - 100 fL    MCH 31.4 26.5 - 33.0 pg    MCHC 33.5 31.5 - 36.5 g/dL    RDW 13.1 10.0 - 15.0 %    Platelet Count 202 150 - 450 10e3/uL    % Neutrophils 54 %    % Lymphocytes 29 %    % Monocytes 15 %    % Eosinophils 2 %    % Basophils 0 %    % Immature Granulocytes 0 %    NRBCs per 100 WBC 0 <1 /100    Absolute Neutrophils 4.7 1.6 - 8.3 10e3/uL    Absolute Lymphocytes 2.6 0.8 - 5.3 10e3/uL    Absolute Monocytes 1.3 0.0 - 1.3 10e3/uL    Absolute Eosinophils 0.2 0.0 - 0.7 10e3/uL    Absolute Basophils 0.0 0.0 - 0.2 10e3/uL    Absolute Immature Granulocytes 0.0 <=0.4 10e3/uL    Absolute NRBCs 0.0 10e3/uL   CBC and Differential     Status: None    Narrative    The following orders were created for panel order CBC and Differential.  Procedure                               Abnormality         Status                     ---------                               -----------         ------                     CBC with platelets and d...[[id number]]                      Final result                 Please view results for these tests on the individual orders.

## 2023-03-28 ENCOUNTER — LAB (OUTPATIENT)
Dept: LAB | Facility: OTHER | Age: 68
End: 2023-03-28
Attending: STUDENT IN AN ORGANIZED HEALTH CARE EDUCATION/TRAINING PROGRAM
Payer: MEDICARE

## 2023-03-28 DIAGNOSIS — E11.9 TYPE 2 DIABETES MELLITUS WITHOUT COMPLICATION, WITHOUT LONG-TERM CURRENT USE OF INSULIN (H): ICD-10-CM

## 2023-03-28 LAB
ANION GAP SERPL CALCULATED.3IONS-SCNC: 8 MMOL/L (ref 7–15)
BUN SERPL-MCNC: 16.9 MG/DL (ref 8–23)
CALCIUM SERPL-MCNC: 9.5 MG/DL (ref 8.8–10.2)
CHLORIDE SERPL-SCNC: 101 MMOL/L (ref 98–107)
CREAT SERPL-MCNC: 0.92 MG/DL (ref 0.67–1.17)
DEPRECATED HCO3 PLAS-SCNC: 29 MMOL/L (ref 22–29)
GFR SERPL CREATININE-BSD FRML MDRD: 90 ML/MIN/1.73M2
GLUCOSE SERPL-MCNC: 145 MG/DL (ref 70–99)
HBA1C MFR BLD: 6.9 % (ref 4–6.2)
HOLD SPECIMEN: NORMAL
POTASSIUM SERPL-SCNC: 3.9 MMOL/L (ref 3.4–5.3)
SODIUM SERPL-SCNC: 138 MMOL/L (ref 136–145)

## 2023-03-28 PROCEDURE — 82310 ASSAY OF CALCIUM: CPT | Mod: ZL

## 2023-03-28 PROCEDURE — 36415 COLL VENOUS BLD VENIPUNCTURE: CPT | Mod: ZL

## 2023-03-28 PROCEDURE — 83036 HEMOGLOBIN GLYCOSYLATED A1C: CPT | Mod: ZL

## 2023-05-12 DIAGNOSIS — L40.9 PSORIASIS: ICD-10-CM

## 2023-05-15 NOTE — TELEPHONE ENCOUNTER
[facility] sent Rx request for the following:      Requested Prescriptions   Pending Prescriptions Disp Refills     adalimumab (HUMIRA PEN) 40 MG/0.8ML pen kit 1 each 11     Sig: Inject 0.8 mLs (40 mg) Subcutaneous every 14 days   Last Prescription Date:   7/12/22  Last Fill Qty/Refills:         1, R-11    Last Office Visit:              2/24/23   Future Office visit:           None    Heather Holly RN .............. 5/15/2023  2:41 PM

## 2023-06-14 ENCOUNTER — TELEPHONE (OUTPATIENT)
Dept: INTERNAL MEDICINE | Facility: OTHER | Age: 68
End: 2023-06-14
Payer: COMMERCIAL

## 2023-06-14 NOTE — TELEPHONE ENCOUNTER
Gianni from [facility] assist called to get clarification on the prescription for Humira. The fax received did not provide clarification. Needs clarification on what the fax sent back means. Please all.    Maite Brewster on 6/14/2023 at 4:11 PM

## 2023-06-14 NOTE — TELEPHONE ENCOUNTER
Returned call to [facility] to discuss the clarification for Humira prescription.  Pharmacist stated that in the past, patient has used the citrate free Humira and this prescription is the regular.  Pharmacist inquiring if provider wanted to change or it should be the citrate free.  Please advise.    Marilin Piña LPN on 6/14/2023 at 4:47 PM

## 2023-06-15 NOTE — TELEPHONE ENCOUNTER
Please keep the citrate free as it has been previously.  Further refills need to come from dermatology.    Tony Sow MD on 6/15/2023 at 12:13 PM

## 2023-06-20 NOTE — TELEPHONE ENCOUNTER
Call returned to D[facility] Joshi and Dr. Sow comments were relayed to them.    Iam Noonan .......  6/20/2023  8:44 AM

## 2023-07-30 ENCOUNTER — HEALTH MAINTENANCE LETTER (OUTPATIENT)
Age: 68
End: 2023-07-30

## 2023-07-31 ENCOUNTER — TELEPHONE (OUTPATIENT)
Dept: INTERNAL MEDICINE | Facility: OTHER | Age: 68
End: 2023-07-31
Payer: COMMERCIAL

## 2023-07-31 DIAGNOSIS — I10 PRIMARY HYPERTENSION: Primary | ICD-10-CM

## 2023-07-31 DIAGNOSIS — E11.9 TYPE 2 DIABETES MELLITUS WITHOUT COMPLICATION, WITHOUT LONG-TERM CURRENT USE OF INSULIN (H): ICD-10-CM

## 2023-07-31 DIAGNOSIS — E78.5 HYPERLIPIDEMIA, UNSPECIFIED HYPERLIPIDEMIA TYPE: ICD-10-CM

## 2023-07-31 NOTE — TELEPHONE ENCOUNTER
Reason for call: Request a lab order.    Order requested for what kind of lab? Labs before his med check on 9/28/23.     Who is your PCP? [facility]    Preferred method for responding to this message: Telephone Call    Phone number patient can be reached at: Cell number on file:    Telephone Information:   Mobile [phone]       If we cannot reach you directly, may we leave a detailed response at the number you provided? Yes    Please call when order is placed. Patient will then schedule his labs.       Aleisha Ha on 7/31/2023 at 10:03 AM

## 2023-07-31 NOTE — TELEPHONE ENCOUNTER
Provider is back in office 8-9, request is not for/until 9/28/23 so will route to PCP for consideration.   Cathy Elmore LPN...................7/31/2023   11:04 AM

## 2023-08-01 DIAGNOSIS — L40.9 PSORIASIS: ICD-10-CM

## 2023-08-01 NOTE — TELEPHONE ENCOUNTER
Reason for call: Medication or medication refill    Name of medication requested: humira pen    Are you out of the medication? NO    What pharmacy do you use? [email or web address] in [place], [place]    Preferred method for responding to this message: Telephone Call    Phone number patient can be reached at: Cell number on file:    Telephone Information:   Mobile [phone]       If we cannot reach you directly, may we leave a detailed response at the number you provided? Yes      Patient stated the pharmacy told him they have yet to hear back about this order.      Tennille Chisholm on 8/1/2023 at 12:24 PM

## 2023-08-01 NOTE — TELEPHONE ENCOUNTER
"No previous requests noted.    Requested Prescriptions   Pending Prescriptions Disp Refills    adalimumab (HUMIRA PEN) 40 MG/0.8ML pen kit 1 each 8     Sig: Inject 0.8 mLs (40 mg) Subcutaneous every 14 days   Last Prescription Date:   5/15/23  Last Fill Qty/Refills:         1, R-8    Last Office Visit:              2/24/23   Future Office visit:             Next 5 appointments (look out 90 days)      Sep 28, 2023 12:40 PM  SHORT with Tony Sow MD  [facility] ([facility] ) [address]  [facility] [place] [address] [phone]          Per 6/14/23 telephone encounter, further refills to come from dermatology. Pharmacy was notified.    Called and spoke to Patient after verifying last name and date of birth. Pt states, \"I have never seen a dermatologist in my life. I have always gotten prescriptions from Dr. Linda.\"    Routing to covering provider for refill consideration, as PCP/provider is out of clinic >48 hours or Pt is completely out of medication and provider is out of the clinic today.    Heather Holly RN .............. 8/1/2023  12:45 PM      "

## 2023-08-02 PROBLEM — E11.9 TYPE 2 DIABETES MELLITUS WITHOUT COMPLICATION, WITHOUT LONG-TERM CURRENT USE OF INSULIN (H): Status: ACTIVE | Noted: 2023-08-02

## 2023-08-02 NOTE — TELEPHONE ENCOUNTER
Orders signed. Please come for fasting blood work 1-2 days prior to appt/     Tony Sow MD on 8/2/2023 at 4:40 PM

## 2023-08-03 NOTE — TELEPHONE ENCOUNTER
After verifying patient's name and date of birth, patient was given the below information.  Patient understood.    Keisha Gee LPN....8/3/2023 9:40 AM

## 2023-09-12 ENCOUNTER — TELEPHONE (OUTPATIENT)
Dept: INTERNAL MEDICINE | Facility: OTHER | Age: 68
End: 2023-09-12
Payer: COMMERCIAL

## 2023-09-12 DIAGNOSIS — L40.9 PSORIASIS: ICD-10-CM

## 2023-09-12 NOTE — TELEPHONE ENCOUNTER
The pharmacy would like a 3 month supply of RX - Humira.    Fax#   [phone]        Soha Doe on 9/12/2023 at 12:35 PM

## 2023-09-28 ENCOUNTER — LAB (OUTPATIENT)
Dept: LAB | Facility: OTHER | Age: 68
End: 2023-09-28
Attending: STUDENT IN AN ORGANIZED HEALTH CARE EDUCATION/TRAINING PROGRAM
Payer: COMMERCIAL

## 2023-09-28 ENCOUNTER — OFFICE VISIT (OUTPATIENT)
Dept: INTERNAL MEDICINE | Facility: OTHER | Age: 68
End: 2023-09-28
Attending: STUDENT IN AN ORGANIZED HEALTH CARE EDUCATION/TRAINING PROGRAM
Payer: MEDICARE

## 2023-09-28 VITALS
WEIGHT: 244.8 LBS | HEIGHT: 66 IN | RESPIRATION RATE: 18 BRPM | OXYGEN SATURATION: 93 % | SYSTOLIC BLOOD PRESSURE: 154 MMHG | BODY MASS INDEX: 39.34 KG/M2 | DIASTOLIC BLOOD PRESSURE: 82 MMHG | TEMPERATURE: 98.1 F | HEART RATE: 92 BPM

## 2023-09-28 DIAGNOSIS — I10 PRIMARY HYPERTENSION: ICD-10-CM

## 2023-09-28 DIAGNOSIS — E78.5 HYPERLIPIDEMIA, UNSPECIFIED HYPERLIPIDEMIA TYPE: ICD-10-CM

## 2023-09-28 DIAGNOSIS — R73.9 HYPERGLYCEMIA: ICD-10-CM

## 2023-09-28 DIAGNOSIS — E11.9 TYPE 2 DIABETES MELLITUS WITHOUT COMPLICATION, WITHOUT LONG-TERM CURRENT USE OF INSULIN (H): Primary | ICD-10-CM

## 2023-09-28 DIAGNOSIS — R06.09 DOE (DYSPNEA ON EXERTION): ICD-10-CM

## 2023-09-28 DIAGNOSIS — I10 ESSENTIAL HYPERTENSION: ICD-10-CM

## 2023-09-28 DIAGNOSIS — L40.9 PSORIASIS: ICD-10-CM

## 2023-09-28 DIAGNOSIS — E11.9 TYPE 2 DIABETES MELLITUS WITHOUT COMPLICATION, WITHOUT LONG-TERM CURRENT USE OF INSULIN (H): ICD-10-CM

## 2023-09-28 DIAGNOSIS — E66.01 MORBID OBESITY (H): ICD-10-CM

## 2023-09-28 LAB
ANION GAP SERPL CALCULATED.3IONS-SCNC: 9 MMOL/L (ref 7–15)
BUN SERPL-MCNC: 14.7 MG/DL (ref 8–23)
CALCIUM SERPL-MCNC: 8.8 MG/DL (ref 8.8–10.2)
CHLORIDE SERPL-SCNC: 104 MMOL/L (ref 98–107)
CHOLEST SERPL-MCNC: 142 MG/DL
CREAT SERPL-MCNC: 0.94 MG/DL (ref 0.67–1.17)
CREAT UR-MCNC: 303.9 MG/DL
EGFRCR SERPLBLD CKD-EPI 2021: 88 ML/MIN/1.73M2
GLUCOSE SERPL-MCNC: 134 MG/DL (ref 70–99)
HBA1C MFR BLD: 6.9 % (ref 4–6.2)
HCO3 SERPL-SCNC: 27 MMOL/L (ref 22–29)
HDLC SERPL-MCNC: 50 MG/DL
LDLC SERPL CALC-MCNC: 71 MG/DL
MICROALBUMIN UR-MCNC: 18.2 MG/L
MICROALBUMIN/CREAT UR: 5.99 MG/G CR (ref 0–17)
NONHDLC SERPL-MCNC: 92 MG/DL
POTASSIUM SERPL-SCNC: 3.9 MMOL/L (ref 3.4–5.3)
SODIUM SERPL-SCNC: 140 MMOL/L (ref 135–145)
TRIGL SERPL-MCNC: 103 MG/DL

## 2023-09-28 PROCEDURE — G0008 ADMIN INFLUENZA VIRUS VAC: HCPCS

## 2023-09-28 PROCEDURE — 99214 OFFICE O/P EST MOD 30 MIN: CPT | Performed by: STUDENT IN AN ORGANIZED HEALTH CARE EDUCATION/TRAINING PROGRAM

## 2023-09-28 PROCEDURE — 83036 HEMOGLOBIN GLYCOSYLATED A1C: CPT | Mod: ZL

## 2023-09-28 PROCEDURE — 82570 ASSAY OF URINE CREATININE: CPT | Mod: ZL

## 2023-09-28 PROCEDURE — 80048 BASIC METABOLIC PNL TOTAL CA: CPT | Mod: ZL

## 2023-09-28 PROCEDURE — G0463 HOSPITAL OUTPT CLINIC VISIT: HCPCS | Mod: 25

## 2023-09-28 PROCEDURE — 36415 COLL VENOUS BLD VENIPUNCTURE: CPT | Mod: ZL

## 2023-09-28 PROCEDURE — 80061 LIPID PANEL: CPT | Mod: ZL

## 2023-09-28 RX ORDER — LANCETS
EACH MISCELLANEOUS
Qty: 100 EACH | Refills: 6 | Status: SHIPPED | OUTPATIENT
Start: 2023-09-28

## 2023-09-28 RX ORDER — METOPROLOL SUCCINATE 50 MG/1
50 TABLET, EXTENDED RELEASE ORAL DAILY
Qty: 90 TABLET | Refills: 4 | Status: SHIPPED | OUTPATIENT
Start: 2023-09-28 | End: 2024-01-04

## 2023-09-28 RX ORDER — ALBUTEROL SULFATE 90 UG/1
2 AEROSOL, METERED RESPIRATORY (INHALATION) EVERY 4 HOURS PRN
Qty: 18 G | Refills: 3 | Status: SHIPPED | OUTPATIENT
Start: 2023-09-28 | End: 2024-01-04

## 2023-09-28 RX ORDER — LOSARTAN POTASSIUM AND HYDROCHLOROTHIAZIDE 25; 100 MG/1; MG/1
1 TABLET ORAL DAILY
Qty: 90 TABLET | Refills: 3 | Status: SHIPPED | OUTPATIENT
Start: 2023-09-28 | End: 2024-01-04

## 2023-09-28 RX ORDER — ROSUVASTATIN CALCIUM 20 MG/1
20 TABLET, COATED ORAL DAILY
Qty: 90 TABLET | Refills: 4 | Status: SHIPPED | OUTPATIENT
Start: 2023-09-28 | End: 2024-01-04

## 2023-09-28 ASSESSMENT — PAIN SCALES - GENERAL: PAINLEVEL: NO PAIN (0)

## 2023-09-28 NOTE — PROGRESS NOTES
Assessment & Plan         ICD-10-CM    1. Type 2 diabetes mellitus without complication, without long-term current use of insulin (H)  E11.9 blood glucose monitoring (NO BRAND SPECIFIED) meter device kit     thin (NO BRAND SPECIFIED) lancets     blood glucose (NO BRAND SPECIFIED) test strip      2. Psoriasis  L40.9 adalimumab (HUMIRA PEN) 40 MG/0.8ML pen kit      3. Hyperlipidemia, unspecified hyperlipidemia type  E78.5 rosuvastatin (CRESTOR) 20 MG tablet      4. Essential hypertension  I10 metoprolol succinate ER (TOPROL XL) 50 MG 24 hr tablet      5. Hyperglycemia  R73.9 metFORMIN (GLUCOPHAGE) 500 MG tablet      6. ORTIZ (dyspnea on exertion)  R06.09 albuterol (PROAIR HFA/PROVENTIL HFA/VENTOLIN HFA) 108 (90 Base) MCG/ACT inhaler      7. Primary hypertension  I10 losartan-hydrochlorothiazide (HYZAAR) 100-25 MG tablet      8. Morbid obesity (H)  E66.01         Type 2 diabetes: A1c drawn this morning was 6.9.  Unchanged from previous.  Remains on metformin.  This was refilled.  With his obesity, BMI 39.51 he would be an excellent candidate for GLP-1 but he declines this therapy today and would like to continue metformin monotherapy.  He is working on reducing his sweets.  He was given a prescription for a glucometer to check his blood sugars as needed.    Psoriasis: Refill of his Humira was given he is only getting 1 month supply at a time and would like 3.    Hypertension: On losartan, hydrochlorothiazide and metoprolol.  Blood pressure remains elevated today on recheck, but has some life stressors right now and he would like to recheck this at follow-up visit and make medication changes at that time if he cannot get it down.    Reviewed and refilled all of his medications.  Flu shot given today.  Follow-up with me in 3 months for Medicare wellness exam, diabetes follow-up.      Tony Sow MD  [facility]      Subjective   Bartolo is a 68 year old, presenting for the following health  "issues:  Recheck Medication      History of Present Illness       Diabetes:   He presents for follow up of diabetes.    He is not checking blood glucose.         He has no concerns regarding his diabetes at this time.  He is having weight gain.  The patient has not had a diabetic eye exam in the last 12 months.          Hypertension: He presents for follow up of hypertension.  He does not check blood pressure  regularly outside of the clinic. Outpatient blood pressures have not been over 140/90. He follows a low salt diet. He consumes 1 sweetened beverage(s) daily.He exercises with enough effort to increase his heart rate 9 or less minutes per day.  He exercises with enough effort to increase his heart rate 3 or less days per week.   He is taking medications regularly.         Medication recheck  How many servings of fruits and vegetables do you eat daily?  2-3  On average, how many sweetened beverages do you drink each day (Examples: soda, juice, sweet tea, etc.  Do NOT count diet or artificially sweetened beverages)?   1  How many days per week do you exercise enough to make your heart beat faster? 3 or less  How many minutes a day do you exercise enough to make your heart beat faster? 9 or less  How many days per week do you miss taking your medication? 0        Needs refills of humeria for psoriasis.   Having difficulty getting supply.     Blood sugars, not checking at home.  Reviewed his laboratory results including his BMP, microalbumin and hemoglobin A1c which were drawn this morning prior to our visit and are normal today.    Blood pressure high.   Having stress today. Loss of cousin. Stress with brother.       Review of Systems         Objective    BP (!) 154/82 (BP Location: Right arm, Patient Position: Sitting, Cuff Size: Adult Large)   Pulse 92   Temp 98.1  F (36.7  C) (Temporal)   Resp 18   Ht 1.676 m (5' 6\")   Wt 111 kg (244 lb 12.8 oz)   SpO2 93%   BMI 39.51 kg/m    Body mass index is 39.51 " kg/m .  Physical Exam     Skin/MSK: Diabetic foot exam was performed with intact sensation to monofilament testing. No wounds or callous appreciated.

## 2023-09-28 NOTE — NURSING NOTE
"Chief Complaint   Patient presents with    Recheck Medication       Medication reconciliation completed.    FOOD SECURITY SCREENING QUESTIONS:    The next two questions are to help us understand your food security.  If you are feeling you need any assistance in this area, we have resources available to support you today.    Hunger Vital Signs:  Within the past 12 months we worried whether our food would run out before we got money to buy more. Never  Within the past 12 months the food we bought just didn't last and we didn't have money to get more. Never    Initial BP (!) 154/82 (BP Location: Right arm, Patient Position: Sitting, Cuff Size: Adult Large)   Pulse 92   Temp 98.1  F (36.7  C) (Temporal)   Resp 18   Ht 1.676 m (5' 6\")   Wt 111 kg (244 lb 12.8 oz)   SpO2 93%   BMI 39.51 kg/m   Estimated body mass index is 39.51 kg/m  as calculated from the following:    Height as of this encounter: 1.676 m (5' 6\").    Weight as of this encounter: 111 kg (244 lb 12.8 oz).       Keisha Gee LPN .......  9/28/2023  12:32 PM    "

## 2023-10-08 ENCOUNTER — HEALTH MAINTENANCE LETTER (OUTPATIENT)
Age: 68
End: 2023-10-08

## 2023-12-28 ASSESSMENT — ENCOUNTER SYMPTOMS
ABDOMINAL PAIN: 0
HEMATOCHEZIA: 0
NERVOUS/ANXIOUS: 1
COUGH: 1
HEMATURIA: 0

## 2023-12-28 ASSESSMENT — ACTIVITIES OF DAILY LIVING (ADL): CURRENT_FUNCTION: NO ASSISTANCE NEEDED

## 2024-01-02 ENCOUNTER — TELEPHONE (OUTPATIENT)
Dept: INTERNAL MEDICINE | Facility: OTHER | Age: 69
End: 2024-01-02
Payer: COMMERCIAL

## 2024-01-02 DIAGNOSIS — E11.9 TYPE 2 DIABETES MELLITUS WITHOUT COMPLICATION, WITHOUT LONG-TERM CURRENT USE OF INSULIN (H): Primary | ICD-10-CM

## 2024-01-02 DIAGNOSIS — Z12.5 ENCOUNTER FOR SCREENING FOR MALIGNANT NEOPLASM OF PROSTATE: ICD-10-CM

## 2024-01-02 DIAGNOSIS — Z13.220 ENCOUNTER FOR SCREENING FOR LIPID DISORDER: ICD-10-CM

## 2024-01-02 DIAGNOSIS — Z00.00 MEDICARE ANNUAL WELLNESS VISIT, SUBSEQUENT: ICD-10-CM

## 2024-01-02 NOTE — PROGRESS NOTES
Patient is coming in for lab only appointment on 1/4 and needs lab orders placed.  Thank you,  Lab

## 2024-01-02 NOTE — TELEPHONE ENCOUNTER
After verifying last name and  patient notifed of the below information.   Quynh Carter LPN on 2024 at 12:02 PM

## 2024-01-02 NOTE — TELEPHONE ENCOUNTER
Patient coming in for lab only appointment 1/04/2024 prior to seeing PCP MARITZA on the same date. GI Lab is requesting orders be put in.     Amanda Holcomb LPN on 1/2/2024 at 9:37 AM   Ext. [phone]

## 2024-01-04 ENCOUNTER — LAB (OUTPATIENT)
Dept: LAB | Facility: OTHER | Age: 69
End: 2024-01-04
Attending: STUDENT IN AN ORGANIZED HEALTH CARE EDUCATION/TRAINING PROGRAM
Payer: MEDICARE

## 2024-01-04 ENCOUNTER — OFFICE VISIT (OUTPATIENT)
Dept: INTERNAL MEDICINE | Facility: OTHER | Age: 69
End: 2024-01-04
Attending: STUDENT IN AN ORGANIZED HEALTH CARE EDUCATION/TRAINING PROGRAM
Payer: MEDICARE

## 2024-01-04 VITALS
WEIGHT: 243 LBS | RESPIRATION RATE: 20 BRPM | OXYGEN SATURATION: 94 % | TEMPERATURE: 96.3 F | HEIGHT: 66 IN | HEART RATE: 94 BPM | DIASTOLIC BLOOD PRESSURE: 72 MMHG | SYSTOLIC BLOOD PRESSURE: 126 MMHG | BODY MASS INDEX: 39.05 KG/M2

## 2024-01-04 DIAGNOSIS — E78.5 HYPERLIPIDEMIA, UNSPECIFIED HYPERLIPIDEMIA TYPE: ICD-10-CM

## 2024-01-04 DIAGNOSIS — Z00.00 MEDICARE ANNUAL WELLNESS VISIT, SUBSEQUENT: Primary | ICD-10-CM

## 2024-01-04 DIAGNOSIS — R06.09 DOE (DYSPNEA ON EXERTION): ICD-10-CM

## 2024-01-04 DIAGNOSIS — I10 ESSENTIAL HYPERTENSION: ICD-10-CM

## 2024-01-04 DIAGNOSIS — I10 PRIMARY HYPERTENSION: ICD-10-CM

## 2024-01-04 DIAGNOSIS — Z12.5 ENCOUNTER FOR SCREENING FOR MALIGNANT NEOPLASM OF PROSTATE: ICD-10-CM

## 2024-01-04 DIAGNOSIS — Z13.220 ENCOUNTER FOR SCREENING FOR LIPID DISORDER: ICD-10-CM

## 2024-01-04 DIAGNOSIS — E11.9 TYPE 2 DIABETES MELLITUS WITHOUT COMPLICATION, WITHOUT LONG-TERM CURRENT USE OF INSULIN (H): ICD-10-CM

## 2024-01-04 DIAGNOSIS — Z00.00 MEDICARE ANNUAL WELLNESS VISIT, SUBSEQUENT: ICD-10-CM

## 2024-01-04 DIAGNOSIS — L40.9 PSORIASIS: ICD-10-CM

## 2024-01-04 DIAGNOSIS — R73.9 HYPERGLYCEMIA: ICD-10-CM

## 2024-01-04 LAB
ALBUMIN SERPL BCG-MCNC: 4 G/DL (ref 3.5–5.2)
ALP SERPL-CCNC: 119 U/L (ref 40–150)
ALT SERPL W P-5'-P-CCNC: 61 U/L (ref 0–70)
ANION GAP SERPL CALCULATED.3IONS-SCNC: 10 MMOL/L (ref 7–15)
AST SERPL W P-5'-P-CCNC: 41 U/L (ref 0–45)
BASOPHILS # BLD AUTO: 0 10E3/UL (ref 0–0.2)
BASOPHILS NFR BLD AUTO: 0 %
BILIRUB SERPL-MCNC: 0.5 MG/DL
BUN SERPL-MCNC: 16.6 MG/DL (ref 8–23)
CALCIUM SERPL-MCNC: 9.3 MG/DL (ref 8.8–10.2)
CHLORIDE SERPL-SCNC: 101 MMOL/L (ref 98–107)
CHOLEST SERPL-MCNC: 131 MG/DL
CREAT SERPL-MCNC: 1 MG/DL (ref 0.67–1.17)
DEPRECATED HCO3 PLAS-SCNC: 27 MMOL/L (ref 22–29)
EGFRCR SERPLBLD CKD-EPI 2021: 81 ML/MIN/1.73M2
EOSINOPHIL # BLD AUTO: 0.2 10E3/UL (ref 0–0.7)
EOSINOPHIL NFR BLD AUTO: 2 %
ERYTHROCYTE [DISTWIDTH] IN BLOOD BY AUTOMATED COUNT: 12.6 % (ref 10–15)
FASTING STATUS PATIENT QL REPORTED: YES
GLUCOSE SERPL-MCNC: 145 MG/DL (ref 70–99)
HBA1C MFR BLD: 6.9 % (ref 4–6.2)
HCT VFR BLD AUTO: 47.9 % (ref 40–53)
HDLC SERPL-MCNC: 49 MG/DL
HGB BLD-MCNC: 16.2 G/DL (ref 13.3–17.7)
HOLD SPECIMEN: NORMAL
IMM GRANULOCYTES # BLD: 0 10E3/UL
IMM GRANULOCYTES NFR BLD: 0 %
LDLC SERPL CALC-MCNC: 67 MG/DL
LYMPHOCYTES # BLD AUTO: 2.4 10E3/UL (ref 0.8–5.3)
LYMPHOCYTES NFR BLD AUTO: 27 %
MCH RBC QN AUTO: 31.4 PG (ref 26.5–33)
MCHC RBC AUTO-ENTMCNC: 33.8 G/DL (ref 31.5–36.5)
MCV RBC AUTO: 93 FL (ref 78–100)
MONOCYTES # BLD AUTO: 1.1 10E3/UL (ref 0–1.3)
MONOCYTES NFR BLD AUTO: 12 %
NEUTROPHILS # BLD AUTO: 5.3 10E3/UL (ref 1.6–8.3)
NEUTROPHILS NFR BLD AUTO: 59 %
NONHDLC SERPL-MCNC: 82 MG/DL
NRBC # BLD AUTO: 0 10E3/UL
NRBC BLD AUTO-RTO: 0 /100
PLATELET # BLD AUTO: 179 10E3/UL (ref 150–450)
POTASSIUM SERPL-SCNC: 3.9 MMOL/L (ref 3.4–5.3)
PROT SERPL-MCNC: 6.7 G/DL (ref 6.4–8.3)
PSA SERPL DL<=0.01 NG/ML-MCNC: 0.26 NG/ML (ref 0–4.5)
RBC # BLD AUTO: 5.16 10E6/UL (ref 4.4–5.9)
SODIUM SERPL-SCNC: 138 MMOL/L (ref 135–145)
TRIGL SERPL-MCNC: 77 MG/DL
WBC # BLD AUTO: 9 10E3/UL (ref 4–11)

## 2024-01-04 PROCEDURE — G0463 HOSPITAL OUTPT CLINIC VISIT: HCPCS

## 2024-01-04 PROCEDURE — 36415 COLL VENOUS BLD VENIPUNCTURE: CPT | Mod: ZL

## 2024-01-04 PROCEDURE — 85004 AUTOMATED DIFF WBC COUNT: CPT | Mod: ZL

## 2024-01-04 PROCEDURE — G0463 HOSPITAL OUTPT CLINIC VISIT: HCPCS | Mod: 25

## 2024-01-04 PROCEDURE — G0103 PSA SCREENING: HCPCS | Mod: ZL

## 2024-01-04 PROCEDURE — 80061 LIPID PANEL: CPT | Mod: ZL

## 2024-01-04 PROCEDURE — 99207 PR FOOT EXAM NO CHARGE: CPT | Performed by: STUDENT IN AN ORGANIZED HEALTH CARE EDUCATION/TRAINING PROGRAM

## 2024-01-04 PROCEDURE — 80053 COMPREHEN METABOLIC PANEL: CPT | Mod: ZL

## 2024-01-04 PROCEDURE — 90480 ADMN SARSCOV2 VAC 1/ONLY CMP: CPT

## 2024-01-04 PROCEDURE — 83036 HEMOGLOBIN GLYCOSYLATED A1C: CPT | Mod: ZL

## 2024-01-04 PROCEDURE — G0439 PPPS, SUBSEQ VISIT: HCPCS | Performed by: STUDENT IN AN ORGANIZED HEALTH CARE EDUCATION/TRAINING PROGRAM

## 2024-01-04 PROCEDURE — 99213 OFFICE O/P EST LOW 20 MIN: CPT | Mod: 25 | Performed by: STUDENT IN AN ORGANIZED HEALTH CARE EDUCATION/TRAINING PROGRAM

## 2024-01-04 RX ORDER — METOPROLOL SUCCINATE 50 MG/1
50 TABLET, EXTENDED RELEASE ORAL DAILY
Qty: 90 TABLET | Refills: 4 | Status: SHIPPED | OUTPATIENT
Start: 2024-01-04

## 2024-01-04 RX ORDER — ALBUTEROL SULFATE 90 UG/1
2 AEROSOL, METERED RESPIRATORY (INHALATION) EVERY 4 HOURS PRN
Qty: 18 G | Refills: 4 | Status: SHIPPED | OUTPATIENT
Start: 2024-01-04 | End: 2024-07-02

## 2024-01-04 RX ORDER — LOSARTAN POTASSIUM AND HYDROCHLOROTHIAZIDE 25; 100 MG/1; MG/1
1 TABLET ORAL DAILY
Qty: 90 TABLET | Refills: 4 | Status: SHIPPED | OUTPATIENT
Start: 2024-01-04

## 2024-01-04 RX ORDER — ROSUVASTATIN CALCIUM 20 MG/1
20 TABLET, COATED ORAL DAILY
Qty: 90 TABLET | Refills: 4 | Status: SHIPPED | OUTPATIENT
Start: 2024-01-04

## 2024-01-04 ASSESSMENT — ENCOUNTER SYMPTOMS
HEMATURIA: 0
HEMATOCHEZIA: 0
NERVOUS/ANXIOUS: 1
ABDOMINAL PAIN: 0
COUGH: 1

## 2024-01-04 ASSESSMENT — ACTIVITIES OF DAILY LIVING (ADL): CURRENT_FUNCTION: NO ASSISTANCE NEEDED

## 2024-01-04 ASSESSMENT — PAIN SCALES - GENERAL: PAINLEVEL: NO PAIN (0)

## 2024-01-04 NOTE — PROGRESS NOTES
"SUBJECTIVE:   Bartolo is a 68 year old, presenting for the following:  Medicare Visit (Annual & DM check )        1/4/2024     7:57 AM   Additional Questions   Roomed by Kaylee Holcomb LPN       Are you in the first 12 months of your Medicare coverage?  No    Healthy Habits:     In general, how would you rate your overall health?  Fair    Frequency of exercise:  2-3 days/week    Duration of exercise:  Less than 15 minutes    Do you usually eat at least 4 servings of fruit and vegetables a day, include whole grains    & fiber and avoid regularly eating high fat or \"junk\" foods?  No    Taking medications regularly:  Yes    Medication side effects:  None    Ability to successfully perform activities of daily living:  No assistance needed    Home Safety:  No safety concerns identified    Hearing Impairment:  No hearing concerns    In the past 6 months, have you been bothered by leaking of urine?  No    In general, how would you rate your overall mental or emotional health?  Fair    Additional concerns today:  No      Has some skin lesions he would like to look at.     Diabetes: Has been focusing on being more active, walking his dog and trying to lose weight.  He will ever keep sweets in the house.  No low blood sugars.    Wt Readings from Last 5 Encounters:   01/04/24 110.2 kg (243 lb)   09/28/23 111 kg (244 lb 12.8 oz)   03/24/23 111.5 kg (245 lb 14.4 oz)   02/24/23 111.1 kg (245 lb)   12/14/22 110.6 kg (243 lb 12.8 oz)     He unfortunately lost his brother last year and is dealing with some issues with his estate.          Today's PHQ-2 Score:       1/4/2024     7:53 AM   PHQ-2 ( 1999 [employer])   Q1: Little interest or pleasure in doing things 0   Q2: Feeling down, depressed or hopeless 1   PHQ-2 Score 1   Q1: Little interest or pleasure in doing things Not at all   Q2: Feeling down, depressed or hopeless Several days   PHQ-2 Score 1           Have you ever done Advance Care Planning? (For example, a Health Directive, " POLST, or a discussion with a medical provider or your loved ones about your wishes): No, advance care planning information given to patient to review.  Patient plans to discuss their wishes with loved ones or provider.         Fall risk  Fallen 2 or more times in the past year?: No  Any fall with injury in the past year?: No    Cognitive Screening   1) Repeat 3 items (Leader, Season, Table)    2) Clock draw: ABNORMAL    3) 3 item recall: Recalls 2 objects   Results: ABNORMAL clock, 1-2 items recalled: PROBABLE COGNITIVE IMPAIRMENT, **INFORM PROVIDER**    Mini-CogTM Copyright S Diallo. Licensed by the author for use in [facility]; reprinted with permission (he@[facility]). All rights reserved.      Do you have sleep apnea, excessive snoring or daytime drowsiness? : no    Reviewed and updated as needed this visit by clinical staff   Tobacco  Allergies  Meds              Reviewed and updated as needed this visit by Provider                 Social History     Tobacco Use    Smoking status: Former     Types: Cigarettes     Quit date: 1/15/2003     Years since quittin.9    Smokeless tobacco: Never   Substance Use Topics    Alcohol use: Yes     Alcohol/week: 2.0 standard drinks of alcohol     Comment: Occasional             2023    10:07 AM   Alcohol Use   Prescreen: >3 drinks/day or >7 drinks/week? No     Do you have a current opioid prescription? No  Do you use any other controlled substances or medications that are not prescribed by a provider? Cannabis    Current providers sharing in care for this patient include:   Patient Care Team:  Tony Sow MD as PCP - General (Internal Medicine)  Tony Sow MD as Assigned PCP    The following health maintenance items are reviewed in Epic and correct as of today:  Health Maintenance   Topic Date Due    EYE EXAM  Never done    RSV VACCINE (Pregnancy & 60+) (1 - 1-dose 60+ series) Never done    MEDICARE ANNUAL WELLNESS VISIT  2023     "COVID-19 Vaccine (6 - 2023-24 season) 09/01/2023    ZOSTER IMMUNIZATION (2 of 2) 11/24/2023    A1C  04/04/2024    MICROALBUMIN  09/28/2024    DIABETIC FOOT EXAM  09/28/2024    BMP  01/04/2025    LIPID  01/04/2025    FALL RISK ASSESSMENT  01/04/2025    DTAP/TDAP/TD IMMUNIZATION (2 - Td or Tdap) 05/04/2027    ADVANCE CARE PLANNING  08/17/2027    COLORECTAL CANCER SCREENING  07/30/2030    HEPATITIS C SCREENING  Completed    PHQ-2 (once per calendar year)  Completed    INFLUENZA VACCINE  Completed    Pneumococcal Vaccine: 65+ Years  Completed    AORTIC ANEURYSM SCREENING (SYSTEM ASSIGNED)  Completed    IPV IMMUNIZATION  Aged Out    HPV IMMUNIZATION  Aged Out    MENINGITIS IMMUNIZATION  Aged Out    RSV MONOCLONAL ANTIBODY  Aged Out     Lab work is in process      Review of Systems   HENT:  Positive for congestion.    Respiratory:  Positive for cough.    Cardiovascular:  Positive for chest pain.   Gastrointestinal:  Negative for abdominal pain and hematochezia.   Genitourinary:  Negative for hematuria, impotence and penile discharge.   Psychiatric/Behavioral:  The patient is nervous/anxious.          OBJECTIVE:   /72   Pulse 94   Temp (!) 96.3  F (35.7  C) (Temporal)   Resp 20   Ht 1.676 m (5' 6\")   Wt 110.2 kg (243 lb)   SpO2 94%   BMI 39.22 kg/m   Estimated body mass index is 39.22 kg/m  as calculated from the following:    Height as of this encounter: 1.676 m (5' 6\").    Weight as of this encounter: 110.2 kg (243 lb).  Physical Exam  Vitals and nursing note reviewed.   Constitutional:       General: He is not in acute distress.     Appearance: He is well-developed. He is not diaphoretic.   HENT:      Head: Normocephalic and atraumatic.      Right Ear: Tympanic membrane, ear canal and external ear normal.      Left Ear: Tympanic membrane, ear canal and external ear normal.      Mouth/Throat:      Mouth: Mucous membranes are moist.      Pharynx: Oropharynx is clear.   Eyes:      General: No scleral " icterus.     Conjunctiva/sclera: Conjunctivae normal.   Cardiovascular:      Rate and Rhythm: Normal rate and regular rhythm.      Heart sounds: No murmur heard.  Pulmonary:      Effort: Pulmonary effort is normal.      Breath sounds: Normal breath sounds.      Comments: Scattered end expiratory wheeze  Abdominal:      Palpations: Abdomen is soft. There is no mass.      Tenderness: There is no abdominal tenderness.   Musculoskeletal:         General: No deformity.      Cervical back: Neck supple.   Lymphadenopathy:      Cervical: No cervical adenopathy.   Skin:     General: Skin is warm and dry.      Coloration: Skin is not jaundiced.      Findings: No rash.      Comments: Multiple seborrheic keratoses, cherry angiomas and occasional dermatofibroma's on the back and chest.  No atypical nevi appreciated.   Neurological:      Mental Status: He is alert and oriented to person, place, and time. Mental status is at baseline.   Psychiatric:         Mood and Affect: Mood normal.         Behavior: Behavior normal.         Thought Content: Thought content normal.           Diagnostic Test Results:  Labs reviewed in Epic    ASSESSMENT / PLAN:       ICD-10-CM    1. Type 2 diabetes mellitus without complication, without long-term current use of insulin (H)  E11.9       2. Psoriasis  L40.9       3. ORTIZ (dyspnea on exertion)  R06.09 albuterol (PROAIR HFA/PROVENTIL HFA/VENTOLIN HFA) 108 (90 Base) MCG/ACT inhaler      4. Primary hypertension  I10 losartan-hydrochlorothiazide (HYZAAR) 100-25 MG tablet      5. Hyperglycemia  R73.9 metFORMIN (GLUCOPHAGE) 500 MG tablet      6. Essential hypertension  I10 metoprolol succinate ER (TOPROL XL) 50 MG 24 hr tablet      7. Hyperlipidemia, unspecified hyperlipidemia type  E78.5 rosuvastatin (CRESTOR) 20 MG tablet      8. Medicare annual wellness visit, subsequent  Z00.00 FOOT EXAM        Medicare wellness exam: Updated past medical history surgical history social history and medications.  His  "cholesterol is under very good control appreciate him coming in for labs early.  Continue his rosuvastatin.  He is due for colonoscopy in July 2025.  He will get his second shingles vaccine and RSV vaccine at the pharmacy.  COVID-19 booster was given today in clinic.      Type 2 diabetes: He will continue to work on being active and weight loss.  Declines GLP-1 therapy.  Continue metformin 500 mg daily could increase to twice daily in the future.    Hypertension: Blood pressure under better control today than his last visit with increased life stressors.      follow-up with me in 6 months for diabetes follow-up.    Patient has been advised of split billing requirements and indicates understanding: Yes      COUNSELING:  Reviewed preventive health counseling, as reflected in patient instructions      BMI:   Estimated body mass index is 39.22 kg/m  as calculated from the following:    Height as of this encounter: 1.676 m (5' 6\").    Weight as of this encounter: 110.2 kg (243 lb).   Weight management plan: Discussed healthy diet and exercise guidelines      He reports that he quit smoking about 20 years ago. His smoking use included cigarettes. He has never used smokeless tobacco.      Appropriate preventive services were discussed with this patient, including applicable screening as appropriate for fall prevention, nutrition, physical activity, Tobacco-use cessation, weight loss and cognition.  Checklist reviewing preventive services available has been given to the patient.      Tony Sow MD  [facility]    Identified Health Risks:  I have reviewed Opioid Use Disorder and Substance Use Disorder risk factors and made any needed referrals.   "

## 2024-01-04 NOTE — NURSING NOTE
"Chief Complaint   Patient presents with    Medicare Visit     Annual & DM check        Initial /72   Pulse 94   Temp (!) 96.3  F (35.7  C) (Temporal)   Resp 20   Ht 1.676 m (5' 6\")   Wt 110.2 kg (243 lb)   SpO2 94%   BMI 39.22 kg/m   Estimated body mass index is 39.22 kg/m  as calculated from the following:    Height as of this encounter: 1.676 m (5' 6\").    Weight as of this encounter: 110.2 kg (243 lb).  Medication Review: complete    The next two questions are to help us understand your food security.  If you are feeling you need any assistance in this area, we have resources available to support you today.          12/28/2023   SDOH- Food Insecurity   Within the past 12 months, did you worry that your food would run out before you got money to buy more? N   Within the past 12 months, did the food you bought just not last and you didn t have money to get more? N         Health Care Directive:  Patient does not have a Health Care Directive or Living Will: Discussed advance care planning with patient; information given to patient to review.    Amanda Holcomb LPN      "

## 2024-01-09 ENCOUNTER — DOCUMENTATION ONLY (OUTPATIENT)
Dept: OTHER | Facility: CLINIC | Age: 69
End: 2024-01-09
Payer: COMMERCIAL

## 2024-02-01 ENCOUNTER — TRANSFERRED RECORDS (OUTPATIENT)
Dept: HEALTH INFORMATION MANAGEMENT | Facility: OTHER | Age: 69
End: 2024-02-01
Payer: COMMERCIAL

## 2024-02-01 LAB — RETINOPATHY: NEGATIVE

## 2024-05-04 ENCOUNTER — HEALTH MAINTENANCE LETTER (OUTPATIENT)
Age: 69
End: 2024-05-04

## 2024-06-26 ENCOUNTER — DOCUMENTATION ONLY (OUTPATIENT)
Dept: INTERNAL MEDICINE | Facility: OTHER | Age: 69
End: 2024-06-26
Payer: COMMERCIAL

## 2024-06-26 DIAGNOSIS — E11.9 TYPE 2 DIABETES MELLITUS WITHOUT COMPLICATION, WITHOUT LONG-TERM CURRENT USE OF INSULIN (H): Primary | ICD-10-CM

## 2024-06-26 DIAGNOSIS — I10 ESSENTIAL HYPERTENSION: ICD-10-CM

## 2024-06-26 DIAGNOSIS — E66.01 MORBID OBESITY (H): ICD-10-CM

## 2024-07-02 ENCOUNTER — LAB (OUTPATIENT)
Dept: LAB | Facility: OTHER | Age: 69
End: 2024-07-02
Attending: STUDENT IN AN ORGANIZED HEALTH CARE EDUCATION/TRAINING PROGRAM
Payer: COMMERCIAL

## 2024-07-02 ENCOUNTER — OFFICE VISIT (OUTPATIENT)
Dept: INTERNAL MEDICINE | Facility: OTHER | Age: 69
End: 2024-07-02
Attending: STUDENT IN AN ORGANIZED HEALTH CARE EDUCATION/TRAINING PROGRAM
Payer: MEDICARE

## 2024-07-02 VITALS
WEIGHT: 253 LBS | OXYGEN SATURATION: 94 % | DIASTOLIC BLOOD PRESSURE: 78 MMHG | HEART RATE: 78 BPM | HEIGHT: 66 IN | SYSTOLIC BLOOD PRESSURE: 138 MMHG | TEMPERATURE: 97.4 F | RESPIRATION RATE: 20 BRPM | BODY MASS INDEX: 40.66 KG/M2

## 2024-07-02 DIAGNOSIS — E66.01 MORBID OBESITY (H): ICD-10-CM

## 2024-07-02 DIAGNOSIS — E11.9 TYPE 2 DIABETES MELLITUS WITHOUT COMPLICATION, WITHOUT LONG-TERM CURRENT USE OF INSULIN (H): Primary | ICD-10-CM

## 2024-07-02 DIAGNOSIS — J45.990 EXERCISE-INDUCED ASTHMA: ICD-10-CM

## 2024-07-02 DIAGNOSIS — I10 PRIMARY HYPERTENSION: ICD-10-CM

## 2024-07-02 DIAGNOSIS — E11.9 TYPE 2 DIABETES MELLITUS WITHOUT COMPLICATION, WITHOUT LONG-TERM CURRENT USE OF INSULIN (H): ICD-10-CM

## 2024-07-02 DIAGNOSIS — I10 ESSENTIAL HYPERTENSION: ICD-10-CM

## 2024-07-02 LAB
ALBUMIN SERPL BCG-MCNC: 4.1 G/DL (ref 3.5–5.2)
ALBUMIN UR-MCNC: 10 MG/DL
ALP SERPL-CCNC: 103 U/L (ref 40–150)
ALT SERPL W P-5'-P-CCNC: 55 U/L (ref 0–70)
ANION GAP SERPL CALCULATED.3IONS-SCNC: 8 MMOL/L (ref 7–15)
APPEARANCE UR: CLEAR
AST SERPL W P-5'-P-CCNC: 42 U/L (ref 0–45)
BILIRUB SERPL-MCNC: 0.5 MG/DL
BILIRUB UR QL STRIP: NEGATIVE
BUN SERPL-MCNC: 14.3 MG/DL (ref 8–23)
CALCIUM SERPL-MCNC: 8.9 MG/DL (ref 8.8–10.2)
CHLORIDE SERPL-SCNC: 102 MMOL/L (ref 98–107)
CHOLEST SERPL-MCNC: 125 MG/DL
COLOR UR AUTO: YELLOW
CREAT SERPL-MCNC: 0.88 MG/DL (ref 0.67–1.17)
CREAT UR-MCNC: 234.2 MG/DL
DEPRECATED HCO3 PLAS-SCNC: 29 MMOL/L (ref 22–29)
EGFRCR SERPLBLD CKD-EPI 2021: >90 ML/MIN/1.73M2
ERYTHROCYTE [DISTWIDTH] IN BLOOD BY AUTOMATED COUNT: 12.7 % (ref 10–15)
FASTING STATUS PATIENT QL REPORTED: YES
FASTING STATUS PATIENT QL REPORTED: YES
GLUCOSE SERPL-MCNC: 158 MG/DL (ref 70–99)
GLUCOSE UR STRIP-MCNC: NEGATIVE MG/DL
HBA1C MFR BLD: 7.4 % (ref 4–6.2)
HCT VFR BLD AUTO: 46.6 % (ref 40–53)
HDLC SERPL-MCNC: 51 MG/DL
HGB BLD-MCNC: 15.4 G/DL (ref 13.3–17.7)
HGB UR QL STRIP: NEGATIVE
KETONES UR STRIP-MCNC: NEGATIVE MG/DL
LDLC SERPL CALC-MCNC: 57 MG/DL
LEUKOCYTE ESTERASE UR QL STRIP: NEGATIVE
MCH RBC QN AUTO: 31.8 PG (ref 26.5–33)
MCHC RBC AUTO-ENTMCNC: 33 G/DL (ref 31.5–36.5)
MCV RBC AUTO: 96 FL (ref 78–100)
MICROALBUMIN UR-MCNC: 25.1 MG/L
MICROALBUMIN/CREAT UR: 10.72 MG/G CR (ref 0–17)
MUCOUS THREADS #/AREA URNS LPF: PRESENT /LPF
NITRATE UR QL: NEGATIVE
NONHDLC SERPL-MCNC: 74 MG/DL
PH UR STRIP: 5.5 [PH] (ref 5–9)
PLATELET # BLD AUTO: 186 10E3/UL (ref 150–450)
POTASSIUM SERPL-SCNC: 3.7 MMOL/L (ref 3.4–5.3)
PROT SERPL-MCNC: 6.9 G/DL (ref 6.4–8.3)
RBC # BLD AUTO: 4.85 10E6/UL (ref 4.4–5.9)
RBC URINE: 1 /HPF
SODIUM SERPL-SCNC: 139 MMOL/L (ref 135–145)
SP GR UR STRIP: 1.02 (ref 1–1.03)
TRIGL SERPL-MCNC: 85 MG/DL
TSH SERPL DL<=0.005 MIU/L-ACNC: 2.52 UIU/ML (ref 0.3–4.2)
UROBILINOGEN UR STRIP-MCNC: NORMAL MG/DL
WBC # BLD AUTO: 7.2 10E3/UL (ref 4–11)
WBC URINE: 1 /HPF

## 2024-07-02 PROCEDURE — 81003 URINALYSIS AUTO W/O SCOPE: CPT | Mod: ZL

## 2024-07-02 PROCEDURE — 80053 COMPREHEN METABOLIC PANEL: CPT | Mod: ZL

## 2024-07-02 PROCEDURE — 85027 COMPLETE CBC AUTOMATED: CPT | Mod: ZL

## 2024-07-02 PROCEDURE — G0463 HOSPITAL OUTPT CLINIC VISIT: HCPCS

## 2024-07-02 PROCEDURE — 82043 UR ALBUMIN QUANTITATIVE: CPT | Mod: ZL

## 2024-07-02 PROCEDURE — G2211 COMPLEX E/M VISIT ADD ON: HCPCS | Performed by: STUDENT IN AN ORGANIZED HEALTH CARE EDUCATION/TRAINING PROGRAM

## 2024-07-02 PROCEDURE — 36415 COLL VENOUS BLD VENIPUNCTURE: CPT | Mod: ZL

## 2024-07-02 PROCEDURE — 84443 ASSAY THYROID STIM HORMONE: CPT | Mod: ZL

## 2024-07-02 PROCEDURE — 83036 HEMOGLOBIN GLYCOSYLATED A1C: CPT | Mod: ZL

## 2024-07-02 PROCEDURE — 99214 OFFICE O/P EST MOD 30 MIN: CPT | Performed by: STUDENT IN AN ORGANIZED HEALTH CARE EDUCATION/TRAINING PROGRAM

## 2024-07-02 PROCEDURE — 80061 LIPID PANEL: CPT | Mod: ZL

## 2024-07-02 RX ORDER — ALBUTEROL SULFATE 90 UG/1
1-2 AEROSOL, METERED RESPIRATORY (INHALATION) EVERY 4 HOURS PRN
Qty: 18 G | Refills: 11 | Status: SHIPPED | OUTPATIENT
Start: 2024-07-02 | End: 2025-07-02

## 2024-07-02 ASSESSMENT — PAIN SCALES - GENERAL: PAINLEVEL: NO PAIN (0)

## 2024-07-02 NOTE — NURSING NOTE
"Chief Complaint   Patient presents with    Diabetes       Initial /78   Pulse 78   Temp 97.4  F (36.3  C) (Temporal)   Resp 20   Ht 1.676 m (5' 6\")   Wt 114.8 kg (253 lb)   SpO2 94%   BMI 40.84 kg/m   Estimated body mass index is 40.84 kg/m  as calculated from the following:    Height as of this encounter: 1.676 m (5' 6\").    Weight as of this encounter: 114.8 kg (253 lb).  Medication Review: complete    The next two questions are to help us understand your food security.  If you are feeling you need any assistance in this area, we have resources available to support you today.          6/27/2024   SDOH- Food Insecurity   Within the past 12 months, did you worry that your food would run out before you got money to buy more? N   Within the past 12 months, did the food you bought just not last and you didn t have money to get more? N            Health Care Directive:  Patient has a Health Care Directive on file      Amanda Holcomb LPN      "

## 2024-07-02 NOTE — PROGRESS NOTES
Assessment & Plan         ICD-10-CM    1. Type 2 diabetes mellitus without complication, without long-term current use of insulin (H)  E11.9 Basic Metabolic Panel     Hemoglobin A1c     Albumin Random Urine Quantitative with Creat Ratio      2. Morbid obesity (H)  E66.01       3. Primary hypertension  I10       4. Exercise-induced asthma  J45.990 albuterol (PROAIR HFA/PROVENTIL HFA/VENTOLIN HFA) 108 (90 Base) MCG/ACT inhaler        Type 2 diabetes: We discussed increasing his regimen with increase of hemoglobin A1c to 7.4 but he would like to work on diet and exercise over the next 3 months and if hemoglobin A1c continues to climb then we will adjust his metformin at that point.    Morbid obesity: BMI 40.84 contributes to diabetes and hypertension.    Hypertension: Blood pressure 138/78 today he is going to work on diet and exercise and losing 3 pounds between now and his next visit otherwise will change his metoprolol to atenolol or increase his hydrochlorothiazide dosing for better blood pressure management.    Exercise-induced asthma, needs refill of his inhaler.  Not in exacerbation currently.      Labs were ordered for next diabetes follow-up in 3 months.  Lab appointment prior to regular appointment.      The longitudinal plan of care for the diagnosis(es)/condition(s) as documented were addressed during this visit. Due to the added complexity in care, I will continue to support Bartolo in the subsequent management and with ongoing continuity of care.      No follow-ups on file.    Tony Sow MD  [facility]      Subjective   Bartolo is a 69 year old, presenting for the following health issues:  Diabetes      7/2/2024     7:51 AM   Additional Questions   Roomed by Kaylee Holcomb LPN     History of Present Illness       Diabetes:   He presents for follow up of diabetes.  He is checking home blood glucose a few times a month.   He checks blood glucose before meals.  Blood glucose is never over  "200 and never under 70.  When his blood glucose is low, the patient is asymptomatic for confusion, blurred vision, lethargy and reports not feeling dizzy, shaky, or weak.   He has no concerns regarding his diabetes at this time.   He is not experiencing numbness or burning in feet, excessive thirst, blurry vision, weight changes or redness, sores or blisters on feet.             He had a family reunion last weekend. A little too much good food.       Blood pressure on higher end of normal    Needs a new inhaler.   Has exercise induced asthma.           Objective    /78   Pulse 78   Temp 97.4  F (36.3  C) (Temporal)   Resp 20   Ht 1.676 m (5' 6\")   Wt 114.8 kg (253 lb)   SpO2 94%   BMI 40.84 kg/m    Body mass index is 40.84 kg/m .  Physical Exam   General: Pleasant 69-year-old man sitting clinic no acute distress    Labs reviewed with the patient in clinic today.    Lab on 07/02/2024   Component Date Value Ref Range Status    Color Urine 07/02/2024 Yellow  Colorless, Straw, Light Yellow, Yellow Final    Appearance Urine 07/02/2024 Clear  Clear Final    Glucose Urine 07/02/2024 Negative  Negative mg/dL Final    Bilirubin Urine 07/02/2024 Negative  Negative Final    Ketones Urine 07/02/2024 Negative  Negative mg/dL Final    Specific Gravity Urine 07/02/2024 1.023  1.000 - 1.030 Final    Blood Urine 07/02/2024 Negative  Negative Final    pH Urine 07/02/2024 5.5  5.0 - 9.0 Final    Protein Albumin Urine 07/02/2024 10 (A)  Negative mg/dL Final    Urobilinogen Urine 07/02/2024 Normal  Normal, 2.0 mg/dL Final    Nitrite Urine 07/02/2024 Negative  Negative Final    Leukocyte Esterase Urine 07/02/2024 Negative  Negative Final    RBC Urine 07/02/2024 1  <=2 /HPF Final    WBC Urine 07/02/2024 1  <=5 /HPF Final    Mucus Urine 07/02/2024 Present (A)  None Seen /LPF Final    TSH 07/02/2024 2.52  0.30 - 4.20 uIU/mL Final    Hemoglobin A1C 07/02/2024 7.4 (H)  4.0 - 6.2 % Final    WBC Count 07/02/2024 7.2  4.0 - 11.0 " 10e3/uL Final    RBC Count 07/02/2024 4.85  4.40 - 5.90 10e6/uL Final    Hemoglobin 07/02/2024 15.4  13.3 - 17.7 g/dL Final    Hematocrit 07/02/2024 46.6  40.0 - 53.0 % Final    MCV 07/02/2024 96  78 - 100 fL Final    MCH 07/02/2024 31.8  26.5 - 33.0 pg Final    MCHC 07/02/2024 33.0  31.5 - 36.5 g/dL Final    RDW 07/02/2024 12.7  10.0 - 15.0 % Final    Platelet Count 07/02/2024 186  150 - 450 10e3/uL Final    Creatinine Urine mg/dL 07/02/2024 234.2  mg/dL Final    The reference ranges have not been established in urine creatinine. The results should be integrated into the clinical context for interpretation.    Albumin Urine mg/L 07/02/2024 25.1  mg/L Final    The reference ranges have not been established in urine albumin. The results should be integrated into the clinical context for interpretation.    Albumin Urine mg/g Cr 07/02/2024 10.72  0.00 - 17.00 mg/g Cr Final    Microalbuminuria is defined as an albumin:creatinine ratio of 17 to 299 for males and 25 to 299 for females. A ratio of albumin:creatinine of 300 or higher is indicative of overt proteinuria.  Due to biologic variability, positive results should be confirmed by a second, first-morning random or 24-hour timed urine specimen. If there is discrepancy, a third specimen is recommended. When 2 out of 3 results are in the microalbuminuria range, this is evidence for incipient nephropathy and warrants increased efforts at glucose control, blood pressure control, and institution of therapy with an angiotensin-converting-enzyme (ACE) inhibitor (if the patient can tolerate it).      Cholesterol 07/02/2024 125  <200 mg/dL Final    Triglycerides 07/02/2024 85  <150 mg/dL Final    Direct Measure HDL 07/02/2024 51  >=40 mg/dL Final    LDL Cholesterol Calculated 07/02/2024 57  <=100 mg/dL Final    Non HDL Cholesterol 07/02/2024 74  <130 mg/dL Final    Patient Fasting > 8hrs? 07/02/2024 Yes   Final    Sodium 07/02/2024 139  135 - 145 mmol/L Final    Potassium  07/02/2024 3.7  3.4 - 5.3 mmol/L Final    Carbon Dioxide (CO2) 07/02/2024 29  22 - 29 mmol/L Final    Anion Gap 07/02/2024 8  7 - 15 mmol/L Final    Urea Nitrogen 07/02/2024 14.3  8.0 - 23.0 mg/dL Final    Creatinine 07/02/2024 0.88  0.67 - 1.17 mg/dL Final    GFR Estimate 07/02/2024 >90  >60 mL/min/1.73m2 Final    eGFR calculated using 2021 CKD-EPI equation.    Calcium 07/02/2024 8.9  8.8 - 10.2 mg/dL Final    Chloride 07/02/2024 102  98 - 107 mmol/L Final    Glucose 07/02/2024 158 (H)  70 - 99 mg/dL Final    Alkaline Phosphatase 07/02/2024 103  40 - 150 U/L Final    AST 07/02/2024 42  0 - 45 U/L Final    Reference intervals for this test were updated on 6/12/2023 to more accurately reflect our healthy population. There may be differences in the flagging of prior results with similar values performed with this method. Interpretation of those prior results can be made in the context of the updated reference intervals.    ALT 07/02/2024 55  0 - 70 U/L Final    Reference intervals for this test were updated on 6/12/2023 to more accurately reflect our healthy population. There may be differences in the flagging of prior results with similar values performed with this method. Interpretation of those prior results can be made in the context of the updated reference intervals.      Protein Total 07/02/2024 6.9  6.4 - 8.3 g/dL Final    Albumin 07/02/2024 4.1  3.5 - 5.2 g/dL Final    Bilirubin Total 07/02/2024 0.5  <=1.2 mg/dL Final    Patient Fasting > 8hrs? 07/02/2024 Yes   Final                 Signed Electronically by: Tony Sow MD

## 2024-07-08 ENCOUNTER — MYC MEDICAL ADVICE (OUTPATIENT)
Dept: INTERNAL MEDICINE | Facility: OTHER | Age: 69
End: 2024-07-08
Payer: COMMERCIAL

## 2024-07-08 ENCOUNTER — TELEPHONE (OUTPATIENT)
Dept: INTERNAL MEDICINE | Facility: OTHER | Age: 69
End: 2024-07-08
Payer: COMMERCIAL

## 2024-07-08 DIAGNOSIS — E11.9 TYPE 2 DIABETES MELLITUS WITHOUT COMPLICATION, WITHOUT LONG-TERM CURRENT USE OF INSULIN (H): Primary | ICD-10-CM

## 2024-07-08 NOTE — TELEPHONE ENCOUNTER
Reason for call: Medication or medication refill    Have you contacted your pharmacy regarding this refill? y    If No, direct the patient to contact the Pharmacy and discontinue telephone note using Erroneous Encounter.  If Yes, continue.    Name of medication requested: Inhaler- Ins silviano pay for his Aluterol  uses [facility]    How many days of medication do you have left? 0    What [facility] do [facility]    Preferred method for responding to this message: Telephone Call    Phone number patient can be reached at: Cell number on file:    Telephone Information:   Mobile [phone]       If we cannot reach you directly, may we leave a detailed response at the number you provided? Yes     Ins brit pay- needs different inhaler-- please call today

## 2024-07-08 NOTE — TELEPHONE ENCOUNTER
After verifying patient name and , patient stated that insurance will not cover the albuterol inhaler. Writer informed patient to call insurance company and ask what they would cover in it's place and return call to the clinic with the name of that medication.   Amanda Holcomb LPN on 2024 at 1:23 PM   Ext. [phone]

## 2024-07-15 ENCOUNTER — ALLIED HEALTH/NURSE VISIT (OUTPATIENT)
Dept: EDUCATION SERVICES | Facility: OTHER | Age: 69
End: 2024-07-15
Attending: STUDENT IN AN ORGANIZED HEALTH CARE EDUCATION/TRAINING PROGRAM
Payer: MEDICARE

## 2024-07-15 DIAGNOSIS — E11.9 TYPE 2 DIABETES MELLITUS WITHOUT COMPLICATION, WITHOUT LONG-TERM CURRENT USE OF INSULIN (H): ICD-10-CM

## 2024-07-15 PROCEDURE — G0108 DIAB MANAGE TRN  PER INDIV: HCPCS | Performed by: REGISTERED NURSE

## 2024-07-15 NOTE — PATIENT INSTRUCTIONS
Diabetes Goals and Reminders    Your A1c test should be done every 3 months.  Your goal is less than 8%.   Your last result is:  Lab Results   Component Value Date    A1C 7.4 07/02/2024    A1C 6.3 01/18/2021       Your LDL cholesterol test should be done at least once a year.  Take a statin, if prescribed by your doctor, based on age and risk factors.  Your last result is:  LDL Cholesterol Calculated   Date Value Ref Range Status   07/02/2024 57 <=100 mg/dL Final   01/18/2021 75 <100 mg/dL Final     Comment:     Desirable:       <100 mg/dl       Have blood pressure and weight checked every three months.  Your blood pressure goal is 140/90 or less.  Your last blood pressure reading was:   BP Readings from Last 1 Encounters:   07/02/24 138/78       Test your blood sugar 1 times per day.  Your home blood glucose target ranges are:  Fasting or Before meals:   2 hours after a meal: Less than 180      Avoid all tobacco.  Follow your healthy diet and exercise plan.  See the eye doctor every year.  See the dentist every six months.  Have kidney function tested yearly.    Take all medicine as prescribed.  Please let me know if you are having symptoms you don t expect or if you wish to stop any medicine.    Follow Up Plan  Please call or visit Diabetes Education if blood sugars are consistently out of target.  Your future lab plan is:  HgA1c in October through January.    If you need your cholesterol checked at your next appointment, you should fast 8 to 10 hours before your appointment.  Do not eat or drink anything besides water.  Drink plenty of water and take all your usual medicine.    SUMMARY FOR TODAY'S VISIT    1.  Continue testing blood sugar 1 time(s) daily.      2.  Discussed carbohydrate counting using the Plate Method for portion control.  Reviewed balanced diet, food groups and incorporating variety, including fruits/vegetables/whole grains/lean protein/nuts and seeds into meals.         Recommend  beginning to count carbohydrates following the low carb plan:  30 grams with breakfast, 30 grams with lunch and 30 grams with dinner.       A key strategy in this plan is, along with medication need, to participate in low to moderate physical activity, such as walking, working up to a minimum of 30 minutes most days, as tolerated.        3.  Discussed D5 Health Goals.  You have met 5 of 5 goals at this time.  (BP less than 140/90, Statin therapy as recommended, A1c less than 8%, tobacco free, Aspirin therapy as recommended).      Achieving the five treatment goals that make up the D5 not only reduces your risk for serious cardiovascular problems like heart attack and stroke, it puts you on a path to better health!  See www.theD5.org for more information.      4.  Treatment options:  Continue current Metformin at this time.      5.  Please follow-up for continued diabetes education, as needed.       Carmen Olmstead RN, BSN, [facility]  7/15/2024 9:49 AM

## 2024-07-15 NOTE — TELEPHONE ENCOUNTER
After trying for over one week to reach patient and with no call back from patient, will close contact.    Carleen Hewitt LPN on 7/15/2024 at 8:03 AM

## 2024-07-15 NOTE — PROGRESS NOTES
Diabetes Self-Management Education & Support    Presents for: Individual review    Type of Service: In Person Visit      ASSESSMENT:  Patient visits for annual diabetes self-management education.  Discussed pathophysiology with interpretation and meaning of HgA1c.  Stressed importance of testing BG daily to help keep tight control of DM2, helping to minimize risk for possible complications of uncontrolled diabetes.  Discussed benefits of keeping A1c under 8% and encouraged patient to continue testing BG daily.     BG report:  Patient did not bring meter or log book to appointment, but states BG range lowest 130s to highest 140s within the last two weeks.      Discussed carbohydrate counting using the Plate Method for portion control.  Reviewed balanced diet, food groups and incorporating variety, including fruits/vegetables/whole grains/lean protein/nuts and seeds into meals.       Begin carbohydrate counting, low carb plan:  30 grams with breakfast, 30 grams with lunch and 30 grams with supper.       A key strategy in this plan is, along with medication need, to participate in low to moderate physical activity, such as walking, working up to a minimum of 30 minutes most days, as tolerated.      Discussed D5 Health Goals and patient has met 5 of 5 goals at this time.  (BP less than 140/90, ASA therapy as recommended, statin therapy as recommended, A1c less than 8%, tobacco free).    Treatment recommendations:    No new changes recommended at this time.    Self-Directed Behavior Goal/s set by patient:  1)  I will begin counting carb at meals to help improve diabetes care until my next A1c in three months.      Patient's most recent   Lab Results   Component Value Date    A1C 7.4 07/02/2024    A1C 6.3 01/18/2021     is meeting goal of <8.0    Diabetes knowledge and skills assessment:   Patient is knowledgeable in diabetes management concepts related to: Being Active, Monitoring, and Taking Medication    Continue  "education with the following diabetes management concepts: Healthy Eating, Reducing Risks, and Healthy Coping    Based on learning assessment above, most appropriate setting for further diabetes education would be: Group class or Individual setting.      PLAN    Begin meal planning and continue physical activity, as usual.    Topics to cover at upcoming visits: Problem Solving    Follow-up: TBD per patient schedule.     See Care Plan for co-developed, patient-state behavior change goals.  AVS provided for patient today.    Education Materials Provided:  Planning Healthy Meals, Activity Pyramid, Diabetes Success Plan, DSMS sheet, and D5 Health Goals.        SUBJECTIVE/OBJECTIVE:  Presents for: Individual review  Accompanied by: Self  Diabetes education in the past 24mo: No  Focus of Visit: Reducing Risks, Diabetes Pathophysiology, Being Active, Healthy Eating  Diabetes type: Type 2  Disease course: Stable  How confident are you filling out medical forms by yourself:: Quite a bit  Cultural Influences/Ethnic Background:  Not  or       Diabetes Symptoms & Complications:  Diabetes Related Symptoms: Polydipsia (increased thirst), Polyuria (increased urination)  Weight trend: Fluctuating  Symptom course: Stable  Disease course: Stable       Patient Problem List and Family Medical History reviewed for relevant medical history, current medical status, and diabetes risk factors.    Vitals:  There were no vitals taken for this visit.  Estimated body mass index is 40.84 kg/m  as calculated from the following:    Height as of 7/2/24: 1.676 m (5' 6\").    Weight as of 7/2/24: 114.8 kg (253 lb).   Last 3 BP:   BP Readings from Last 3 Encounters:   07/02/24 138/78   01/04/24 126/72   09/28/23 (!) 154/82       History   Smoking Status    Former    Types: Cigarettes    Quit date: 1/15/2003   Smokeless Tobacco    Never       Labs:  Lab Results   Component Value Date    A1C 7.4 07/02/2024    A1C 6.3 01/18/2021     Lab " "Results   Component Value Date     07/02/2024     09/14/2022     01/18/2021     Lab Results   Component Value Date    LDL 57 07/02/2024    LDL 75 01/18/2021     HDL Cholesterol   Date Value Ref Range Status   01/18/2021 44 23 - 92 mg/dL Final     Direct Measure HDL   Date Value Ref Range Status   07/02/2024 51 >=40 mg/dL Final   ]  GFR Estimate   Date Value Ref Range Status   07/02/2024 >90 >60 mL/min/1.73m2 Final     Comment:     eGFR calculated using 2021 CKD-EPI equation.   01/18/2021 82 >60 mL/min/[1.73_m2] Final     GFR Estimate If Black   Date Value Ref Range Status   01/18/2021 >90 >60 mL/min/[1.73_m2] Final     Lab Results   Component Value Date    CR 0.88 07/02/2024    CR 0.92 01/18/2021     No results found for: \"MICROALBUMIN\"    Healthy Eating:  Healthy Eating Assessed Today: Yes  Cultural/Confucianism diet restrictions?: No  Meal planning/habits: Avoiding sweets  Who cooks/prepares meals for you?: Self  How many times a week on average do you eat food made away from home (restaurant/take-out)?: 4  Meals include: Breakfast, Lunch, Dinner  Beverages: Water, Coffee, Milk    Being Active:  Being Active Assessed Today: Yes  Exercise:: Yes  Days per week of moderate to strenuous exercise (like a brisk walk): 2  On average, minutes per day of exercise at this level: 20  How intense was your typical exercise? : Moderate (like brisk walking)  Exercise Minutes per Week: 40  Barrier to exercise: None    Monitoring:  Blood Glucose Meter: Accu-chek  Times checking blood sugar at home (number): 2  Times checking blood sugar at home (per): Week      Taking Medications:  Diabetes Medication(s)       Biguanides       metFORMIN (GLUCOPHAGE) 500 MG tablet Take 1 tablet (500 mg) by mouth daily (with breakfast)            Current Treatments: Diet, Oral Medication (taken by mouth)      Reducing Risks:  Diabetes Risks: Age over 45 years  CAD Risks: Diabetes Mellitus, Family history, Male sex  Has dilated eye " exam at least once a year?: Yes  Sees dentist every 6 months?: Yes  Feet checked by healthcare provider in the last year?: Yes    Healthy Coping:  Informal Support system:: Family  Stage of change: PREPARATION (Decided to change - considering how)  Support resources: Offerings in Clinic Communities  Patient Activation Measure Survey Score:       No data to display                  Care Plan and Education Provided:  Care Plan: Diabetes   Updates made by Carmen Olmstead RN since 7/15/2024 12:00 AM        Problem: HbA1C Not In Goal         Goal: Establish Regular Follow-Ups with PCP         Task: Discuss with PCP the recommended timing for patient's next follow up visit(s)    Responsible User: Carmen Olmstead RN        Task: Discuss schedule for PCP visits with patient Completed 7/15/2024   Responsible User: Carmen Olmstead RN        Goal: Get HbA1C Level in Goal         Task: Educate patient on diabetes education self-management topics Completed 7/15/2024   Responsible User: Carmen Olmstead RN        Task: Educate patient on benefits of regular glucose monitoring Completed 7/15/2024   Responsible User: Carmen Olmstead RN        Task: Refer patient to appropriate extended care team member, as needed (Medication Therapy Management, Behavioral Health, Physical Therapy, etc.)    Responsible User: Carmen Olmstead RN        Task: Discuss diabetes treatment plan with patient Completed 7/15/2024   Responsible User: Carmen Olmstead RN        Problem: Diabetes Self-Management Education Needed to Optimize Self-Care Behaviors         Goal: Understand diabetes pathophysiology and disease progression         Task: Provide education on diabetes pathophysiology and disease progression specfic to patient's diabetes type Completed 7/15/2024   Responsible User: Carmen Olmstead RN        Goal: Healthy Eating - follow a healthy eating pattern for diabetes         Task: Provide education on portion control and consistency in  amount, composition and timing of food intake Completed 7/15/2024   Responsible User: Carmen Olmstead RN        Task: Provide education on managing carbohydrate intake (carbohydrate counting, plate planning method, etc.) Completed 7/15/2024   Responsible User: Carmen Olmstead RN        Task: Provide education on weight management    Responsible User: Carmen Olmstead RN        Task: Provide education on heart healthy eating    Responsible User: Carmen Olmstead RN        Task: Provide education on eating out    Responsible User: Carmen Olmstead RN        Task: Develop individualized healthy eating plan with patient    Responsible User: Carmen Olmstead RN        Goal: Being Active - get regular physical activity, working up to at least 150 minutes per week         Task: Provide education on relationship of activity to glucose and precautions to take if at risk for low glucose Completed 7/15/2024   Responsible User: Carmen Olmstead RN        Task: Discuss barriers to physical activity with patient    Responsible User: Carmen Olmstead RN        Task: Develop physical activity plan with patient    Responsible User: Carmen Olmstead RN        Task: Explore community resources including walking groups, assistance programs, and home videos    Responsible User: Carmen Olmstead RN        Goal: Monitoring - monitor glucose and ketones as directed         Task: Provide education on blood glucose monitoring (purpose, proper technique, frequency, glucose targets, interpreting results, when to use glucose control solution, sharps disposal) Completed 7/15/2024   Responsible User: Carmen Olmstead RN        Task: Provide education on continuous glucose monitoring (sensor placement, use of mary or /reader, understanding glucose trends, alerts and alarms, differences between sensor glucose and blood glucose)    Responsible User: Carmen Olmstead RN        Task: Provide education on ketone monitoring (when to monitor,  frequency, etc.)    Responsible User: Carmen Olmstead RN        Goal: Taking Medication - patient is consistently taking medications as directed         Task: Provide education on action of prescribed medication, including when to take and possible side effects Completed 7/15/2024   Responsible User: Carmen Olmstead RN        Task: Provide education on insulin and injectable diabetes medications, including administration, storage, site selection and rotation for injection sites    Responsible User: Carmen Olmstead RN        Task: Discuss barriers to medication adherence with patient and provide management technique ideas as appropriate    Responsible User: Carmen Olmstead RN        Task: Provide education on frequency and refill details of medications    Responsible User: Carmen Olmstead RN        Goal: Problem Solving - know how to prevent and manage short-term diabetes complications         Task: Provide education on high blood glucose - causes, signs/symptoms, prevention and treatment Completed 7/15/2024   Responsible User: Carmen Olmstead RN        Task: Provide education on low blood glucose - causes, signs/symptoms, prevention, treatment, carrying a carbohydrate source at all times, and medical identification    Responsible User: Carmen Olmstead RN        Task: Provide education on safe travel with diabetes    Responsible User: Carmen Olmstead RN        Task: Provide education on how to care for diabetes on sick days    Responsible User: Carmen Olmstead RN        Task: Provide education on when to call a health care provider    Responsible User: Carmen Olmstead RN        Goal: Reducing Risks - know how to prevent and treat long-term diabetes complications         Task: Provide education on major complications of diabetes, prevention, early diagnostic measures and treatment of complications Completed 7/15/2024   Responsible User: Carmen Olmstead RN        Task: Provide education on recommended care  for dental, eye and foot health    Responsible User: Carmen Olmstead RN        Task: Provide education on Hemoglobin A1c - goals and relationship to blood glucose levels Completed 7/15/2024   Responsible User: Carmen Olmstead RN        Task: Provide education on recommendations for heart health - lipid levels and goals, blood pressure and goals, and aspirin therapy, if indicated Completed 7/15/2024   Responsible User: Carmen Olmstead RN        Task: Provide education on tobacco cessation    Responsible User: Carmen Olmstead RN        Goal: Healthy Coping - use available resources to cope with the challenges of managing diabetes         Task: Discuss recognizing feelings about having diabetes    Responsible User: Carmen Olmstead RN        Task: Provide education on the benefits of making appropriate lifestyle changes Completed 7/15/2024   Responsible User: Carmen Olmstead RN        Task: Provide education on benefits of utilizing support systems    Responsible User: Carmen Olmstead RN        Task: Discuss methods for coping with stress    Responsible User: Carmen Olmstead RN        Task: Provide education on when to seek professional counseling    Responsible User: Carmen Olmstead RN Suzette Childs, RN, BSN, [facility]  7/15/2024 10:02 AM     Time Spent: 60 minutes  Encounter Type: Individual    Any diabetes medication dose changes were made via the CDE Protocol per the patient's primary care provider. A copy of this encounter was shared with the provider.

## 2024-09-18 DIAGNOSIS — L40.9 PSORIASIS: ICD-10-CM

## 2024-09-18 NOTE — TELEPHONE ENCOUNTER
Requested Prescriptions   Pending Prescriptions Disp Refills    adalimumab (HUMIRA PEN) 40 MG/0.8ML pen kit 13 each 4     Sig: Inject 0.8 mLs (40 mg) subcutaneously every 14 days.       There is no refill protocol information for this order        Last Prescription Date:   9/28/23  Last Fill Qty/Refills:         13, R-4    Last Office Visit:                7/2/24 1/4/24 (Physical)    Future Office visit:             Next 5 appointments (look out 90 days)      Oct 07, 2024 8:00 AM  (Arrive by 7:45 AM)  Provider Visit with Tony Sow MD  [facility] ([facility] ) [address]  [place] [id number] [phone]     Psoriasis [L40.9]        Unable to complete prescription refill per RN Medication Refill Policy. Prescription refilled per RN Medication Refill Policy.................... Heather Holly RN ....................  9/18/2024   2:59 PM

## 2024-09-18 NOTE — TELEPHONE ENCOUNTER
Reason for call: Medication or medication refill    Have you contacted your pharmacy regarding this refill? Yes    If No, direct the patient to contact the Pharmacy and discontinue telephone note using Erroneous Encounter.  If Yes, continue.    Name of medication requested: Humira    How many days of medication do you have left? 1 month    What pharmacy do you use? [facility] - states it comes directly from them    Preferred method for responding to this message: Telephone Call    Phone number patient can be reached at: Cell number on file:    Telephone Information:   Mobile [phone]       If we cannot reach you directly, may we leave a detailed response at the number you provided? Yes    Maite Brewster on 9/18/2024 at 2:13 PM

## 2024-10-07 ENCOUNTER — OFFICE VISIT (OUTPATIENT)
Dept: INTERNAL MEDICINE | Facility: OTHER | Age: 69
End: 2024-10-07
Attending: STUDENT IN AN ORGANIZED HEALTH CARE EDUCATION/TRAINING PROGRAM
Payer: MEDICARE

## 2024-10-07 ENCOUNTER — LAB (OUTPATIENT)
Dept: LAB | Facility: OTHER | Age: 69
End: 2024-10-07
Attending: STUDENT IN AN ORGANIZED HEALTH CARE EDUCATION/TRAINING PROGRAM
Payer: MEDICARE

## 2024-10-07 VITALS
SYSTOLIC BLOOD PRESSURE: 128 MMHG | WEIGHT: 243.8 LBS | BODY MASS INDEX: 39.18 KG/M2 | OXYGEN SATURATION: 96 % | HEIGHT: 66 IN | RESPIRATION RATE: 16 BRPM | HEART RATE: 88 BPM | TEMPERATURE: 97.3 F | DIASTOLIC BLOOD PRESSURE: 80 MMHG

## 2024-10-07 DIAGNOSIS — E11.9 TYPE 2 DIABETES MELLITUS WITHOUT COMPLICATION, WITHOUT LONG-TERM CURRENT USE OF INSULIN (H): ICD-10-CM

## 2024-10-07 DIAGNOSIS — I10 ESSENTIAL HYPERTENSION: ICD-10-CM

## 2024-10-07 DIAGNOSIS — I10 PRIMARY HYPERTENSION: ICD-10-CM

## 2024-10-07 DIAGNOSIS — E11.9 TYPE 2 DIABETES MELLITUS WITHOUT COMPLICATION, WITHOUT LONG-TERM CURRENT USE OF INSULIN (H): Primary | ICD-10-CM

## 2024-10-07 DIAGNOSIS — E66.01 MORBID OBESITY (H): ICD-10-CM

## 2024-10-07 LAB
ALBUMIN SERPL BCG-MCNC: 4.1 G/DL (ref 3.5–5.2)
ALBUMIN UR-MCNC: 20 MG/DL
ALP SERPL-CCNC: 104 U/L (ref 40–150)
ALT SERPL W P-5'-P-CCNC: 68 U/L (ref 0–70)
ANION GAP SERPL CALCULATED.3IONS-SCNC: 10 MMOL/L (ref 7–15)
APPEARANCE UR: CLEAR
AST SERPL W P-5'-P-CCNC: 53 U/L (ref 0–45)
BILIRUB SERPL-MCNC: 0.4 MG/DL
BILIRUB UR QL STRIP: NEGATIVE
BUN SERPL-MCNC: 15.4 MG/DL (ref 8–23)
CALCIUM SERPL-MCNC: 8.9 MG/DL (ref 8.8–10.4)
CHLORIDE SERPL-SCNC: 100 MMOL/L (ref 98–107)
CHOLEST SERPL-MCNC: 121 MG/DL
COLOR UR AUTO: YELLOW
CREAT SERPL-MCNC: 1.01 MG/DL (ref 0.67–1.17)
CREAT UR-MCNC: 269.7 MG/DL
EGFRCR SERPLBLD CKD-EPI 2021: 80 ML/MIN/1.73M2
ERYTHROCYTE [DISTWIDTH] IN BLOOD BY AUTOMATED COUNT: 12.6 % (ref 10–15)
EST. AVERAGE GLUCOSE BLD GHB EST-MCNC: 148 MG/DL
FASTING STATUS PATIENT QL REPORTED: ABNORMAL
FASTING STATUS PATIENT QL REPORTED: NORMAL
GLUCOSE SERPL-MCNC: 167 MG/DL (ref 70–99)
GLUCOSE UR STRIP-MCNC: NEGATIVE MG/DL
HBA1C MFR BLD: 6.8 %
HCO3 SERPL-SCNC: 27 MMOL/L (ref 22–29)
HCT VFR BLD AUTO: 47.5 % (ref 40–53)
HDLC SERPL-MCNC: 48 MG/DL
HGB BLD-MCNC: 15.8 G/DL (ref 13.3–17.7)
HGB UR QL STRIP: NEGATIVE
HYALINE CASTS: 3 /LPF
KETONES UR STRIP-MCNC: NEGATIVE MG/DL
LDLC SERPL CALC-MCNC: 56 MG/DL
LEUKOCYTE ESTERASE UR QL STRIP: NEGATIVE
MCH RBC QN AUTO: 31.7 PG (ref 26.5–33)
MCHC RBC AUTO-ENTMCNC: 33.3 G/DL (ref 31.5–36.5)
MCV RBC AUTO: 95 FL (ref 78–100)
MICROALBUMIN UR-MCNC: 38.5 MG/L
MICROALBUMIN/CREAT UR: 14.28 MG/G CR (ref 0–17)
MUCOUS THREADS #/AREA URNS LPF: PRESENT /LPF
NITRATE UR QL: NEGATIVE
NONHDLC SERPL-MCNC: 73 MG/DL
PH UR STRIP: 5 [PH] (ref 5–9)
PLATELET # BLD AUTO: 186 10E3/UL (ref 150–450)
POTASSIUM SERPL-SCNC: 3.8 MMOL/L (ref 3.4–5.3)
PROT SERPL-MCNC: 6.9 G/DL (ref 6.4–8.3)
RBC # BLD AUTO: 4.99 10E6/UL (ref 4.4–5.9)
RBC URINE: 1 /HPF
SODIUM SERPL-SCNC: 137 MMOL/L (ref 135–145)
SP GR UR STRIP: 1.02 (ref 1–1.03)
TRIGL SERPL-MCNC: 85 MG/DL
TSH SERPL DL<=0.005 MIU/L-ACNC: 3.47 UIU/ML (ref 0.3–4.2)
UROBILINOGEN UR STRIP-MCNC: NORMAL MG/DL
WBC # BLD AUTO: 7.2 10E3/UL (ref 4–11)
WBC URINE: 4 /HPF

## 2024-10-07 PROCEDURE — 36415 COLL VENOUS BLD VENIPUNCTURE: CPT | Mod: ZL

## 2024-10-07 PROCEDURE — 82465 ASSAY BLD/SERUM CHOLESTEROL: CPT | Mod: ZL

## 2024-10-07 PROCEDURE — 90480 ADMN SARSCOV2 VAC 1/ONLY CMP: CPT

## 2024-10-07 PROCEDURE — 82043 UR ALBUMIN QUANTITATIVE: CPT | Mod: ZL

## 2024-10-07 PROCEDURE — G2211 COMPLEX E/M VISIT ADD ON: HCPCS | Performed by: STUDENT IN AN ORGANIZED HEALTH CARE EDUCATION/TRAINING PROGRAM

## 2024-10-07 PROCEDURE — 80053 COMPREHEN METABOLIC PANEL: CPT | Mod: ZL

## 2024-10-07 PROCEDURE — 85027 COMPLETE CBC AUTOMATED: CPT | Mod: ZL

## 2024-10-07 PROCEDURE — G0463 HOSPITAL OUTPT CLINIC VISIT: HCPCS | Mod: 25

## 2024-10-07 PROCEDURE — 83036 HEMOGLOBIN GLYCOSYLATED A1C: CPT | Mod: ZL

## 2024-10-07 PROCEDURE — 99214 OFFICE O/P EST MOD 30 MIN: CPT | Performed by: STUDENT IN AN ORGANIZED HEALTH CARE EDUCATION/TRAINING PROGRAM

## 2024-10-07 PROCEDURE — 84443 ASSAY THYROID STIM HORMONE: CPT | Mod: ZL

## 2024-10-07 PROCEDURE — G0008 ADMIN INFLUENZA VIRUS VAC: HCPCS

## 2024-10-07 PROCEDURE — 81001 URINALYSIS AUTO W/SCOPE: CPT | Mod: ZL

## 2024-10-07 ASSESSMENT — PAIN SCALES - GENERAL: PAINLEVEL: NO PAIN (0)

## 2024-10-07 NOTE — PROGRESS NOTES
Assessment & Plan         ICD-10-CM    1. Type 2 diabetes mellitus without complication, without long-term current use of insulin (H)  E11.9       2. Primary hypertension  I10         Type II diabetes: Reviewed hemoglobin A1C results. Much better control. Continue metformin without change. Congratulated him on his weight loss over the past 3 months. He will follow up in clinic in 6 months for diabetes management and establish care.     HTN well controlled.     The longitudinal plan of care for the diagnosis(es)/condition(s) as documented were addressed during this visit. Due to the added complexity in care, I will continue to support Bartolo in the subsequent management and with ongoing continuity of care.        No follow-ups on file.    Tony Sow MD  [facility]        Subjective   Bartolo is a 69 year old, presenting for the following health issues:  Diabetes        10/7/2024     7:46 AM   Additional Questions   Roomed by Kaylee Holcomb LPN     History of Present Illness     Asthma:  He presents for follow up of asthma.  He has some cough, some wheezing, and no shortness of breath.  He is using a relief medication a few times a week. He does not miss any doses of his controller medication throughout the week. Patient is aware of the following triggers: exercise or sports. The patient has not had a visit to the Emergency Room, Urgent Care or Hospital due to asthma since the last clinic visit.     Heart Failure:  He presents for follow up of heart failure. He is experiencing shortness of breath with activity only, which is same as usual. He is experiencing lower extremity edema which is same as usual.   He denies orthopenea and coughs at night. Patient is checking weight daily. He has recently had a weight decrease.  He has no side effects from medications.  He has had no other medical visits for heart failure since the last visit.    Hyperlipidemia:  He presents for follow up of hyperlipidemia.    "He is taking medication to lower cholesterol. He is not having myalgia or other side effects to statin medications.    Hypertension: He presents for follow up of hypertension.  He does not check blood pressure  regularly outside of the clinic. Outpatient blood pressures have not been over 140/90. He follows a low salt diet.     He eats 2-3 servings of fruits and vegetables daily.He consumes 0 sweetened beverage(s) daily.He exercises with enough effort to increase his heart rate 20 to 29 minutes per day.  He exercises with enough effort to increase his heart rate 3 or less days per week.   He is taking medications regularly.     Wt Readings from Last 5 Encounters:   10/07/24 110.6 kg (243 lb 12.8 oz)   07/02/24 114.8 kg (253 lb)   01/04/24 110.2 kg (243 lb)   09/28/23 111 kg (244 lb 12.8 oz)   03/24/23 111.5 kg (245 lb 14.4 oz)     He has lost 10 pounds from last visit.   Blood sugars under good control.   Usually 120-160 in AM before breakfast.         Last Echo: No results found.          Objective    /80   Pulse 88   Temp 97.3  F (36.3  C) (Temporal)   Resp 16   Ht 1.676 m (5' 6\")   Wt 110.6 kg (243 lb 12.8 oz)   SpO2 96%   BMI 39.35 kg/m    Body mass index is 39.35 kg/m .  Physical Exam   General: Pleasant 69 year old year old male sitting in clinic in no acute distress   CV: regular rate and rhythm, no murmur, rubs or peripheral edema appreciated          Signed Electronically by: Tony Sow MD    "

## 2024-10-07 NOTE — NURSING NOTE
"Chief Complaint   Patient presents with    Diabetes       Initial /80   Pulse 88   Temp 97.3  F (36.3  C) (Temporal)   Resp 16   Ht 1.676 m (5' 6\")   Wt 110.6 kg (243 lb 12.8 oz)   SpO2 96%   BMI 39.35 kg/m   Estimated body mass index is 39.35 kg/m  as calculated from the following:    Height as of this encounter: 1.676 m (5' 6\").    Weight as of this encounter: 110.6 kg (243 lb 12.8 oz).  Medication Review: complete    The next two questions are to help us understand your food security.  If you are feeling you need any assistance in this area, we have resources available to support you today.          6/27/2024   SDOH- Food Insecurity   Within the past 12 months, did you worry that your food would run out before you got money to buy more? N   Within the past 12 months, did the food you bought just not last and you didn t have money to get more? N            Health Care Directive:  Patient has a Health Care Directive on file      Amanda Holcomb LPN      "

## 2024-10-18 ENCOUNTER — TELEPHONE (OUTPATIENT)
Dept: INTERNAL MEDICINE | Facility: OTHER | Age: 69
End: 2024-10-18
Payer: COMMERCIAL

## 2024-10-18 NOTE — TELEPHONE ENCOUNTER
Requesting a work-in for a pre-op. DOS 10/25 Karon at [facility] for right wrist fracture. Please contact patient to schedule. Diane Mckinley on 10/18/2024 at 12:37 PM

## 2024-10-18 NOTE — TELEPHONE ENCOUNTER
Patient was contacted and a pre-op appointment was made with Dr. Samuels for 10.22.2024.  Tasha Taylor on 10/18/2024 at 2:43 PM

## 2024-10-19 ASSESSMENT — ASTHMA QUESTIONNAIRES
QUESTION_2 LAST FOUR WEEKS HOW OFTEN HAVE YOU HAD SHORTNESS OF BREATH: ONCE OR TWICE A WEEK
ACT_TOTALSCORE: 21
QUESTION_5 LAST FOUR WEEKS HOW WOULD YOU RATE YOUR ASTHMA CONTROL: WELL CONTROLLED
QUESTION_1 LAST FOUR WEEKS HOW MUCH OF THE TIME DID YOUR ASTHMA KEEP YOU FROM GETTING AS MUCH DONE AT WORK, SCHOOL OR AT HOME: A LITTLE OF THE TIME
QUESTION_3 LAST FOUR WEEKS HOW OFTEN DID YOUR ASTHMA SYMPTOMS (WHEEZING, COUGHING, SHORTNESS OF BREATH, CHEST TIGHTNESS OR PAIN) WAKE YOU UP AT NIGHT OR EARLIER THAN USUAL IN THE MORNING: NOT AT ALL
ACT_TOTALSCORE: 21
QUESTION_4 LAST FOUR WEEKS HOW OFTEN HAVE YOU USED YOUR RESCUE INHALER OR NEBULIZER MEDICATION (SUCH AS ALBUTEROL): ONCE A WEEK OR LESS

## 2024-10-22 ENCOUNTER — OFFICE VISIT (OUTPATIENT)
Dept: INTERNAL MEDICINE | Facility: OTHER | Age: 69
End: 2024-10-22
Attending: INTERNAL MEDICINE
Payer: COMMERCIAL

## 2024-10-22 VITALS
TEMPERATURE: 97.8 F | WEIGHT: 243.2 LBS | SYSTOLIC BLOOD PRESSURE: 138 MMHG | RESPIRATION RATE: 18 BRPM | OXYGEN SATURATION: 93 % | HEART RATE: 95 BPM | BODY MASS INDEX: 39.08 KG/M2 | HEIGHT: 66 IN | DIASTOLIC BLOOD PRESSURE: 86 MMHG

## 2024-10-22 DIAGNOSIS — I25.10 CORONARY ARTERY CALCIFICATION: ICD-10-CM

## 2024-10-22 DIAGNOSIS — E66.01 CLASS 2 SEVERE OBESITY DUE TO EXCESS CALORIES WITH SERIOUS COMORBIDITY AND BODY MASS INDEX (BMI) OF 39.0 TO 39.9 IN ADULT (H): ICD-10-CM

## 2024-10-22 DIAGNOSIS — S62.101A WRIST FRACTURE, RIGHT, CLOSED, INITIAL ENCOUNTER: ICD-10-CM

## 2024-10-22 DIAGNOSIS — L40.9 PSORIASIS: ICD-10-CM

## 2024-10-22 DIAGNOSIS — I10 BENIGN ESSENTIAL HYPERTENSION: ICD-10-CM

## 2024-10-22 DIAGNOSIS — E11.22 TYPE 2 DIABETES MELLITUS WITH STAGE 2 CHRONIC KIDNEY DISEASE, WITHOUT LONG-TERM CURRENT USE OF INSULIN (H): ICD-10-CM

## 2024-10-22 DIAGNOSIS — D84.9 IMMUNOSUPPRESSED STATUS (H): ICD-10-CM

## 2024-10-22 DIAGNOSIS — E66.812 CLASS 2 SEVERE OBESITY DUE TO EXCESS CALORIES WITH SERIOUS COMORBIDITY AND BODY MASS INDEX (BMI) OF 39.0 TO 39.9 IN ADULT (H): ICD-10-CM

## 2024-10-22 DIAGNOSIS — Z01.818 PREOP GENERAL PHYSICAL EXAM: Primary | ICD-10-CM

## 2024-10-22 DIAGNOSIS — E78.2 MIXED HYPERLIPIDEMIA: ICD-10-CM

## 2024-10-22 DIAGNOSIS — N18.2 TYPE 2 DIABETES MELLITUS WITH STAGE 2 CHRONIC KIDNEY DISEASE, WITHOUT LONG-TERM CURRENT USE OF INSULIN (H): ICD-10-CM

## 2024-10-22 PROCEDURE — G0463 HOSPITAL OUTPT CLINIC VISIT: HCPCS

## 2024-10-22 PROCEDURE — G2211 COMPLEX E/M VISIT ADD ON: HCPCS | Performed by: INTERNAL MEDICINE

## 2024-10-22 PROCEDURE — 99214 OFFICE O/P EST MOD 30 MIN: CPT | Performed by: INTERNAL MEDICINE

## 2024-10-22 ASSESSMENT — PAIN SCALES - GENERAL: PAINLEVEL: MODERATE PAIN (5)

## 2024-10-22 NOTE — H&P (VIEW-ONLY)
Preoperative Evaluation  [facility]  [address]  [place] [address]  Phone: [phone]  Fax: [phone]  Primary Provider: Tony Sow MD  Pre-op Performing Provider: Alexander Samuels MD  Oct 22, 2024             10/19/2024   Surgical Information   What procedure is being done? Rt wrist open reduction internal fixation   Facility or Hospital where procedure/surgery will be performed: [facility]   Who is doing the procedure / surgery? Dr. Brantley   Date of surgery / procedure: 10/25/2024   Time of surgery / procedure: TBD   Where do you plan to recover after surgery? at home alone        Fax number for surgical facility: Note does not need to be faxed, will be available electronically in Epic.    Assessment & Plan     The proposed surgical procedure is considered INTERMEDIATE risk.      ICD-10-CM    1. Preop general physical exam  Z01.818       2. Wrist fracture, right, closed, initial encounter  S62.101A       3. Benign essential hypertension  I10       4. Mixed hyperlipidemia  E78.2 Lipid Profile      5. Type 2 diabetes mellitus with stage 2 chronic kidney disease, without long-term current use of insulin (H)  E11.22 Urine Macroscopic with reflex to Microscopic    N18.2 TSH with free T4 reflex     Hemoglobin A1c     CBC with platelets     Albumin Random Urine Quantitative with Creat Ratio     Comprehensive metabolic panel      6. Coronary artery calcification  I25.10       7. Class 2 severe obesity due to excess calories with serious comorbidity and body mass index (BMI) of 39.0 to 39.9 in adult (H)  E66.812     E66.01     Z68.39       8. Psoriasis  L40.9       9. Immunosuppressed status (H) -- Humira -- For Psoriasis  D84.9         HPI related to upcoming procedure: Patient fell with outstretched hand, causing laceration of left hand and fracture of right wrist.  Now scheduled for ORIF.    Patient is chronically immunosuppressed with Humira for treatment of  psoriasis.  Advised that he skip his injection which is due on Thursday of this week and wait 2 full weeks following surgery to administer his next dose.    Coronary calcifications, denies exertional angina.  Continues with low-dose aspirin daily which will be held for 5 days prior to surgery.  Doing well with losartan, hydrochlorothiazide.  Plans to hold the day of surgery.  Hold metformin day of surgery.  Continue Crestor.    Standing orders updated.    Psoriasis, chronic, doing quite well with Humira injections.  See above.  Plans to hold for 2 weeks after surgery and skip next dose later this week.    HYPERTENSION - Ongoing. Blood pressure is currently well controlled.  Medication side effects: None. Denies syncope or presyncope.  Continue current medications.   Medication list reviewed/updated. Refills completed as needed.      MIXED HYPERLIPIDEMIA.  Ongoing. LDL is at goal: Yes. Triglycerides are at goal: Yes.    Medication side effects reported: None.   Continue current medications for now. Medication list reviewed/updated. Refills completed as needed.  Recent Labs   Lab Test 10/07/24  0732 07/02/24  0657   CHOL 121 125   HDL 48 51   LDL 56 57   TRIG 85 85        OBESITY - Ongoing.  (See Encounter Diagnosis list above for obesity class / severity).  - Encourage continued maintenance / improvement in diet and exercise.   - Consider Nutrition / Dietician appointment.  - Weight loss would improve Hypertension, Cholesterol and Diabetes.      Chronic Kidney Disease, Stage 2 (GFR 60-89), chronic, ongoing.  Kidney function had been slowly declining.  Encourage NSAID avoidance.    Recent Labs   Lab Test 10/07/24  0732 07/02/24  0657   CR 1.01 0.88   GFRESTIMATED 80 >90        Type 2 Diabetes Mellitus, with nephropathy.  Blood sugar control has been good with minimal hyperglycemia. Doing well with diet, oral agents, and exercise.  Medication list reviewed/updated. Refills completed as needed.    Complicating factors  include but are not limited to: hypertension, hyperlipidemia, chronic kidney disease, and morbid obesity .     Recent Labs   Lab Test 10/07/24  0732 07/02/24  0657 01/04/24  0651   A1C 6.8* 7.4* 6.9*   LDL 56 57 67   HDL 48 51 49   TRIG 85 85 77   ALT 68 55 61   CR 1.01 0.88 1.00   GFRESTIMATED 80 >90 81   POTASSIUM 3.8 3.7 3.9   TSH 3.47 2.52  --    WBC 7.2 7.2 9.0   HGB 15.8 15.4 16.2    186 179   ALBUMIN 4.1 4.1 4.0       The longitudinal plan of care for the diagnosis(es)/condition(s) as documented were addressed during this visit. Due to the added complexity in care, I will continue to support Bartolo in the subsequent management and with ongoing continuity of care.          - No identified additional risk factors other than previously addressed    Antiplatelet or Anticoagulation Medication Instructions   - aspirin: Discontinue aspirin 7-10 days prior to procedure to reduce bleeding risk. It should be resumed postoperatively.     Additional Medication Instructions  Take all scheduled medications on the day of surgery EXCEPT for modifications listed below:   - ACE/ARB: DO NOT TAKE on day of surgery (minimum 11 hours for general anesthesia).   - Diuretics: DO NOT TAKE on the day of surgery.   - metformin: DO NOT TAKE day of surgery.        Recommendation  Approval given to proceed with proposed procedure, without further diagnostic evaluation.    Paco Mcfarland is a 69 year old, presenting for the following:  Pre-Op Exam          10/22/2024     3:53 PM   Additional Questions   Roomed by Kaylee Holcomb LPN             10/19/2024   Pre-Op Questionnaire   Have you ever had a heart attack or stroke? No   Have you ever had surgery on your heart or blood vessels, such as a stent placement, a coronary artery bypass, or surgery on an artery in your head, neck, heart, or legs? No   Do you have chest pain with activity? No   Do you have a history of heart failure? No   Do you currently have a cold, bronchitis or  symptoms of other infection? No   Do you have a cough, shortness of breath, or wheezing? (!) YES - mild, chronic - has asthma   Do you or anyone in your family have previous history of blood clots? No   Do you or does anyone in your family have a serious bleeding problem such as prolonged bleeding following surgeries or cuts? No   Have you ever had problems with anemia or been told to take iron pills? No   Have you had any abnormal blood loss such as black, tarry or bloody stools? No   Have you ever had a blood transfusion? No   Are you willing to have a blood transfusion if it is medically needed before, during, or after your surgery? Yes   Have you or any of your relatives ever had problems with anesthesia? No   Do you have sleep apnea, excessive snoring or daytime drowsiness? No   Do you have any artifical heart valves or other implanted medical devices like a pacemaker, defibrillator, or continuous glucose monitor? No   Do you have artificial joints? No   Are you allergic to latex? No        Health Care Directive  Patient has a Health Care Directive on file      Preoperative Review of    reviewed - no record of controlled substances prescribed.    10/18/2024 had small prescription of hydrocodone prescribed for fracture pain management.            Patient Active Problem List    Diagnosis Date Noted    Benign essential hypertension 10/22/2024     Priority: Medium    Mixed hyperlipidemia 10/22/2024     Priority: Medium    Immunosuppressed status (H) -- Humira -- For Psoriasis 10/22/2024     Priority: Medium    Type 2 diabetes mellitus with stage 2 chronic kidney disease, without long-term current use of insulin (H) 08/02/2023     Priority: Medium    Lower urinary tract symptoms 08/17/2022     Priority: Medium    History of 2019 novel coronavirus disease (COVID-19) 07/01/2022     Priority: Medium    Coronary artery calcification      Priority: Medium    Nonspecific abnormal results of liver function study  02/01/2018     Priority: Medium     Overview:   elevated, Negative biopsy.      Psoriasis 02/01/2018     Priority: Medium    Class 2 severe obesity due to excess calories with serious comorbidity and body mass index (BMI) of 39.0 to 39.9 in adult (H) 02/01/2018     Priority: Medium     Overview:   BMI: 34        Past Medical History:   Diagnosis Date    Adenomatous colon polyp 2020    F/U due in 2025    Coronary artery calcification     Essential (primary) hypertension     No Comments Provided    Hyperlipidemia     No Comments Provided    Obesity 2/1/2018    Overview:  BMI: 34    Psoriasis     No Comments Provided    Transaminitis     negative liver biopsy     Past Surgical History:   Procedure Laterality Date    COLONOSCOPY  08/22/2010    WNL Q10yrs.    COLONOSCOPY N/A 07/30/2020    Due 7/2025 Procedure: COLONOSCOPY, WITH POLYPECTOMY AND BIOPSY;  Surgeon: Eliazar Oakes MD;  Location: [facility] OR    HERNIA REPAIR Right 2009    LIVER BIOPSY  1995     Current Outpatient Medications   Medication Sig Dispense Refill    adalimumab (HUMIRA *CF*) 40 MG/0.4ML pen kit Inject 0.4 mLs (40 mg) subcutaneously every 14 days. 6 each 3    albuterol (PROAIR HFA/PROVENTIL HFA/VENTOLIN HFA) 108 (90 Base) MCG/ACT inhaler Inhale 1-2 puffs into the lungs every 4 hours as needed for shortness of breath or wheezing 18 g 11    aspirin 81 MG tablet Take 81 mg by mouth daily with food      blood glucose (NO BRAND SPECIFIED) test strip Use to test blood sugar 1 times daily. To accompany: Blood Glucose Monitor Brands: per insurance. 100 strip 6    blood glucose monitoring (NO BRAND SPECIFIED) meter device kit Use to test blood sugar 1 times daily. Preferred blood glucose meter OR supplies to accompany: Blood Glucose Monitor Brands: per insurance. 1 kit 0    losartan-hydrochlorothiazide (HYZAAR) 100-25 MG tablet Take 1 tablet by mouth daily 90 tablet 4    metFORMIN (GLUCOPHAGE) 500 MG tablet Take 1 tablet (500 mg) by mouth daily (with breakfast) 90  "tablet 4    metoprolol succinate ER (TOPROL XL) 50 MG 24 hr tablet Take 1 tablet (50 mg) by mouth daily 90 tablet 4    Multiple Vitamin (MULTI-VITAMINS) TABS Take 1 tablet by mouth daily      rosuvastatin (CRESTOR) 20 MG tablet Take 1 tablet (20 mg) by mouth daily 90 tablet 4    thin (NO BRAND SPECIFIED) lancets Use with lanceting device. To accompany: Blood Glucose Monitor Brands: per insurance. 100 each 6       No Known Allergies     Social History     Tobacco Use    Smoking status: Former     Current packs/day: 0.00     Types: Cigarettes     Quit date: 1/15/2003     Years since quittin.7    Smokeless tobacco: Never   Substance Use Topics    Alcohol use: Yes     Alcohol/week: 2.0 standard drinks of alcohol     Comment: Occasional       History   Drug Use    Types: Marijuana     Comment: Drug use: NoMarijuana occasionally               Objective    /86   Pulse 95   Temp 97.8  F (36.6  C) (Temporal)   Resp 18   Ht 1.676 m (5' 6\")   Wt 110.3 kg (243 lb 3.2 oz)   SpO2 93%   BMI 39.25 kg/m     Estimated body mass index is 39.25 kg/m  as calculated from the following:    Height as of this encounter: 1.676 m (5' 6\").    Weight as of this encounter: 110.3 kg (243 lb 3.2 oz).  Physical Exam  Constitutional:       General: He is not in acute distress.     Appearance: Normal appearance. He is well-developed. He is obese. He is not ill-appearing.   HENT:      Head: Normocephalic and atraumatic.   Eyes:      General: No scleral icterus.     Conjunctiva/sclera: Conjunctivae normal.   Neck:      Thyroid: No thyromegaly.      Vascular: No carotid bruit.   Cardiovascular:      Rate and Rhythm: Normal rate and regular rhythm.      Pulses: Normal pulses.   Pulmonary:      Effort: Pulmonary effort is normal. No respiratory distress.      Breath sounds: No wheezing.   Abdominal:      Palpations: Abdomen is soft.      Tenderness: There is no abdominal tenderness.   Musculoskeletal:         General: Swelling and " deformity present. No tenderness.      Right lower le+ Pitting Edema present.      Left lower le+ Pitting Edema present.      Comments: Right wrist with splint / sling.   Left wrist / hand with laceration and sutures.    Skin:     General: Skin is warm and dry.      Findings: No rash.   Neurological:      Mental Status: He is alert. Mental status is at baseline.      Cranial Nerves: No cranial nerve deficit.   Psychiatric:         Mood and Affect: Mood normal.         Behavior: Behavior normal.           Recent Labs   Lab Test 10/07/24  0732 2457   HGB 15.8 15.4    186    139   POTASSIUM 3.8 3.7   CR 1.01 0.88   A1C 6.8* 7.4*      Recent Labs   Lab Test 10/07/24  0732 24  0657 24  0651   A1C 6.8* 7.4* 6.9*   LDL 56 57 67   HDL 48 51 49   TRIG 85 85 77   ALT 68 55 61   CR 1.01 0.88 1.00   GFRESTIMATED 80 >90 81   POTASSIUM 3.8 3.7 3.9   TSH 3.47 2.52  --    WBC 7.2 7.2 9.0   HGB 15.8 15.4 16.2    186 179   ALBUMIN 4.1 4.1 4.0       Diagnostics     EKG 2023 - Sinus rhythm, rate 79 bpm  Normal ECG   When compared with ECG of 2021 09:28,   No significant change was found     Revised Cardiac Risk Index (RCRI)  The patient has the following serious cardiovascular risks for perioperative complications:   - No serious cardiac risks = 0 points     RCRI Interpretation: 0 points: Class I (very low risk - 0.4% complication rate)         Signed Electronically by: Alexander Samuels MD  A copy of this evaluation report is provided to the requesting physician.

## 2024-10-22 NOTE — NURSING NOTE
"Chief Complaint   Patient presents with    Pre-Op Exam       Initial BP (!) 148/92   Pulse 95   Temp 97.8  F (36.6  C) (Temporal)   Resp 18   Ht 1.676 m (5' 6\")   Wt 110.3 kg (243 lb 3.2 oz)   SpO2 93%   BMI 39.25 kg/m   Estimated body mass index is 39.25 kg/m  as calculated from the following:    Height as of this encounter: 1.676 m (5' 6\").    Weight as of this encounter: 110.3 kg (243 lb 3.2 oz).  Medication Review: complete    The next two questions are to help us understand your food security.  If you are feeling you need any assistance in this area, we have resources available to support you today.          6/27/2024   SDOH- Food Insecurity   Within the past 12 months, did you worry that your food would run out before you got money to buy more? N   Within the past 12 months, did the food you bought just not last and you didn t have money to get more? N            Health Care Directive:  Patient has a Health Care Directive on file      Amanda Holcomb LPN      "

## 2024-10-22 NOTE — PATIENT INSTRUCTIONS
Antiplatelet or Anticoagulation Medication Instructions   - aspirin: Discontinue aspirin 7-10 days prior to procedure to reduce bleeding risk. It should be resumed postoperatively.     Additional Medication Instructions  Take all scheduled medications on the day of surgery EXCEPT for modifications listed below:   - ACE/ARB: DO NOT TAKE on day of surgery (minimum 11 hours for general anesthesia).   - Diuretics: DO NOT TAKE on the day of surgery.   - metformin: DO NOT TAKE day of surgery.        Humira (adalimumab) should be held for at least two weeks before surgery and two weeks after surgery. You should restart Humira when the following conditions are met:     There is evidence of wound healing   All sutures and staples are removed   There is no significant swelling, erythema, or drainage   There is no ongoing nonsurgical site infection   The surgeon is happy with the wound     Biologic agents like Humira increase the risk of infection, so they should be withheld for at least twice their half-lives before major surgeries. However, for minor operations, they can be continued because the risk of infection and impaired wound healing is low.

## 2024-10-22 NOTE — PROGRESS NOTES
Preoperative Evaluation  [facility]  [address]  [place] [address]  Phone: [phone]  Fax: [phone]  Primary Provider: Tony Sow MD  Pre-op Performing Provider: Alexander Samuels MD  Oct 22, 2024             10/19/2024   Surgical Information   What procedure is being done? Rt wrist open reduction internal fixation   Facility or Hospital where procedure/surgery will be performed: [facility]   Who is doing the procedure / surgery? Dr. Brantley   Date of surgery / procedure: 10/25/2024   Time of surgery / procedure: TBD   Where do you plan to recover after surgery? at home alone        Fax number for surgical facility: Note does not need to be faxed, will be available electronically in Epic.    Assessment & Plan     The proposed surgical procedure is considered INTERMEDIATE risk.      ICD-10-CM    1. Preop general physical exam  Z01.818       2. Wrist fracture, right, closed, initial encounter  S62.101A       3. Benign essential hypertension  I10       4. Mixed hyperlipidemia  E78.2 Lipid Profile      5. Type 2 diabetes mellitus with stage 2 chronic kidney disease, without long-term current use of insulin (H)  E11.22 Urine Macroscopic with reflex to Microscopic    N18.2 TSH with free T4 reflex     Hemoglobin A1c     CBC with platelets     Albumin Random Urine Quantitative with Creat Ratio     Comprehensive metabolic panel      6. Coronary artery calcification  I25.10       7. Class 2 severe obesity due to excess calories with serious comorbidity and body mass index (BMI) of 39.0 to 39.9 in adult (H)  E66.812     E66.01     Z68.39       8. Psoriasis  L40.9       9. Immunosuppressed status (H) -- Humira -- For Psoriasis  D84.9         HPI related to upcoming procedure: Patient fell with outstretched hand, causing laceration of left hand and fracture of right wrist.  Now scheduled for ORIF.    Patient is chronically immunosuppressed with Humira for treatment of  psoriasis.  Advised that he skip his injection which is due on Thursday of this week and wait 2 full weeks following surgery to administer his next dose.    Coronary calcifications, denies exertional angina.  Continues with low-dose aspirin daily which will be held for 5 days prior to surgery.  Doing well with losartan, hydrochlorothiazide.  Plans to hold the day of surgery.  Hold metformin day of surgery.  Continue Crestor.    Standing orders updated.    Psoriasis, chronic, doing quite well with Humira injections.  See above.  Plans to hold for 2 weeks after surgery and skip next dose later this week.    HYPERTENSION - Ongoing. Blood pressure is currently well controlled.  Medication side effects: None. Denies syncope or presyncope.  Continue current medications.   Medication list reviewed/updated. Refills completed as needed.      MIXED HYPERLIPIDEMIA.  Ongoing. LDL is at goal: Yes. Triglycerides are at goal: Yes.    Medication side effects reported: None.   Continue current medications for now. Medication list reviewed/updated. Refills completed as needed.  Recent Labs   Lab Test 10/07/24  0732 07/02/24  0657   CHOL 121 125   HDL 48 51   LDL 56 57   TRIG 85 85        OBESITY - Ongoing.  (See Encounter Diagnosis list above for obesity class / severity).  - Encourage continued maintenance / improvement in diet and exercise.   - Consider Nutrition / Dietician appointment.  - Weight loss would improve Hypertension, Cholesterol and Diabetes.      Chronic Kidney Disease, Stage 2 (GFR 60-89), chronic, ongoing.  Kidney function had been slowly declining.  Encourage NSAID avoidance.    Recent Labs   Lab Test 10/07/24  0732 07/02/24  0657   CR 1.01 0.88   GFRESTIMATED 80 >90        Type 2 Diabetes Mellitus, with nephropathy.  Blood sugar control has been good with minimal hyperglycemia. Doing well with diet, oral agents, and exercise.  Medication list reviewed/updated. Refills completed as needed.    Complicating factors  include but are not limited to: hypertension, hyperlipidemia, chronic kidney disease, and morbid obesity .     Recent Labs   Lab Test 10/07/24  0732 07/02/24  0657 01/04/24  0651   A1C 6.8* 7.4* 6.9*   LDL 56 57 67   HDL 48 51 49   TRIG 85 85 77   ALT 68 55 61   CR 1.01 0.88 1.00   GFRESTIMATED 80 >90 81   POTASSIUM 3.8 3.7 3.9   TSH 3.47 2.52  --    WBC 7.2 7.2 9.0   HGB 15.8 15.4 16.2    186 179   ALBUMIN 4.1 4.1 4.0       The longitudinal plan of care for the diagnosis(es)/condition(s) as documented were addressed during this visit. Due to the added complexity in care, I will continue to support Bartolo in the subsequent management and with ongoing continuity of care.          - No identified additional risk factors other than previously addressed    Antiplatelet or Anticoagulation Medication Instructions   - aspirin: Discontinue aspirin 7-10 days prior to procedure to reduce bleeding risk. It should be resumed postoperatively.     Additional Medication Instructions  Take all scheduled medications on the day of surgery EXCEPT for modifications listed below:   - ACE/ARB: DO NOT TAKE on day of surgery (minimum 11 hours for general anesthesia).   - Diuretics: DO NOT TAKE on the day of surgery.   - metformin: DO NOT TAKE day of surgery.        Recommendation  Approval given to proceed with proposed procedure, without further diagnostic evaluation.    Paco Mcfarland is a 69 year old, presenting for the following:  Pre-Op Exam          10/22/2024     3:53 PM   Additional Questions   Roomed by Kaylee Holcomb LPN             10/19/2024   Pre-Op Questionnaire   Have you ever had a heart attack or stroke? No   Have you ever had surgery on your heart or blood vessels, such as a stent placement, a coronary artery bypass, or surgery on an artery in your head, neck, heart, or legs? No   Do you have chest pain with activity? No   Do you have a history of heart failure? No   Do you currently have a cold, bronchitis or  symptoms of other infection? No   Do you have a cough, shortness of breath, or wheezing? (!) YES - mild, chronic - has asthma   Do you or anyone in your family have previous history of blood clots? No   Do you or does anyone in your family have a serious bleeding problem such as prolonged bleeding following surgeries or cuts? No   Have you ever had problems with anemia or been told to take iron pills? No   Have you had any abnormal blood loss such as black, tarry or bloody stools? No   Have you ever had a blood transfusion? No   Are you willing to have a blood transfusion if it is medically needed before, during, or after your surgery? Yes   Have you or any of your relatives ever had problems with anesthesia? No   Do you have sleep apnea, excessive snoring or daytime drowsiness? No   Do you have any artifical heart valves or other implanted medical devices like a pacemaker, defibrillator, or continuous glucose monitor? No   Do you have artificial joints? No   Are you allergic to latex? No        Health Care Directive  Patient has a Health Care Directive on file      Preoperative Review of    reviewed - no record of controlled substances prescribed.    10/18/2024 had small prescription of hydrocodone prescribed for fracture pain management.            Patient Active Problem List    Diagnosis Date Noted    Benign essential hypertension 10/22/2024     Priority: Medium    Mixed hyperlipidemia 10/22/2024     Priority: Medium    Immunosuppressed status (H) -- Humira -- For Psoriasis 10/22/2024     Priority: Medium    Type 2 diabetes mellitus with stage 2 chronic kidney disease, without long-term current use of insulin (H) 08/02/2023     Priority: Medium    Lower urinary tract symptoms 08/17/2022     Priority: Medium    History of 2019 novel coronavirus disease (COVID-19) 07/01/2022     Priority: Medium    Coronary artery calcification      Priority: Medium    Nonspecific abnormal results of liver function study  02/01/2018     Priority: Medium     Overview:   elevated, Negative biopsy.      Psoriasis 02/01/2018     Priority: Medium    Class 2 severe obesity due to excess calories with serious comorbidity and body mass index (BMI) of 39.0 to 39.9 in adult (H) 02/01/2018     Priority: Medium     Overview:   BMI: 34        Past Medical History:   Diagnosis Date    Adenomatous colon polyp 2020    F/U due in 2025    Coronary artery calcification     Essential (primary) hypertension     No Comments Provided    Hyperlipidemia     No Comments Provided    Obesity 2/1/2018    Overview:  BMI: 34    Psoriasis     No Comments Provided    Transaminitis     negative liver biopsy     Past Surgical History:   Procedure Laterality Date    COLONOSCOPY  08/22/2010    WNL Q10yrs.    COLONOSCOPY N/A 07/30/2020    Due 7/2025 Procedure: COLONOSCOPY, WITH POLYPECTOMY AND BIOPSY;  Surgeon: Eliazar Oakes MD;  Location: [facility] OR    HERNIA REPAIR Right 2009    LIVER BIOPSY  1995     Current Outpatient Medications   Medication Sig Dispense Refill    adalimumab (HUMIRA *CF*) 40 MG/0.4ML pen kit Inject 0.4 mLs (40 mg) subcutaneously every 14 days. 6 each 3    albuterol (PROAIR HFA/PROVENTIL HFA/VENTOLIN HFA) 108 (90 Base) MCG/ACT inhaler Inhale 1-2 puffs into the lungs every 4 hours as needed for shortness of breath or wheezing 18 g 11    aspirin 81 MG tablet Take 81 mg by mouth daily with food      blood glucose (NO BRAND SPECIFIED) test strip Use to test blood sugar 1 times daily. To accompany: Blood Glucose Monitor Brands: per insurance. 100 strip 6    blood glucose monitoring (NO BRAND SPECIFIED) meter device kit Use to test blood sugar 1 times daily. Preferred blood glucose meter OR supplies to accompany: Blood Glucose Monitor Brands: per insurance. 1 kit 0    losartan-hydrochlorothiazide (HYZAAR) 100-25 MG tablet Take 1 tablet by mouth daily 90 tablet 4    metFORMIN (GLUCOPHAGE) 500 MG tablet Take 1 tablet (500 mg) by mouth daily (with breakfast) 90  "tablet 4    metoprolol succinate ER (TOPROL XL) 50 MG 24 hr tablet Take 1 tablet (50 mg) by mouth daily 90 tablet 4    Multiple Vitamin (MULTI-VITAMINS) TABS Take 1 tablet by mouth daily      rosuvastatin (CRESTOR) 20 MG tablet Take 1 tablet (20 mg) by mouth daily 90 tablet 4    thin (NO BRAND SPECIFIED) lancets Use with lanceting device. To accompany: Blood Glucose Monitor Brands: per insurance. 100 each 6       No Known Allergies     Social History     Tobacco Use    Smoking status: Former     Current packs/day: 0.00     Types: Cigarettes     Quit date: 1/15/2003     Years since quittin.7    Smokeless tobacco: Never   Substance Use Topics    Alcohol use: Yes     Alcohol/week: 2.0 standard drinks of alcohol     Comment: Occasional       History   Drug Use    Types: Marijuana     Comment: Drug use: NoMarijuana occasionally               Objective    /86   Pulse 95   Temp 97.8  F (36.6  C) (Temporal)   Resp 18   Ht 1.676 m (5' 6\")   Wt 110.3 kg (243 lb 3.2 oz)   SpO2 93%   BMI 39.25 kg/m     Estimated body mass index is 39.25 kg/m  as calculated from the following:    Height as of this encounter: 1.676 m (5' 6\").    Weight as of this encounter: 110.3 kg (243 lb 3.2 oz).  Physical Exam  Constitutional:       General: He is not in acute distress.     Appearance: Normal appearance. He is well-developed. He is obese. He is not ill-appearing.   HENT:      Head: Normocephalic and atraumatic.   Eyes:      General: No scleral icterus.     Conjunctiva/sclera: Conjunctivae normal.   Neck:      Thyroid: No thyromegaly.      Vascular: No carotid bruit.   Cardiovascular:      Rate and Rhythm: Normal rate and regular rhythm.      Pulses: Normal pulses.   Pulmonary:      Effort: Pulmonary effort is normal. No respiratory distress.      Breath sounds: No wheezing.   Abdominal:      Palpations: Abdomen is soft.      Tenderness: There is no abdominal tenderness.   Musculoskeletal:         General: Swelling and " deformity present. No tenderness.      Right lower le+ Pitting Edema present.      Left lower le+ Pitting Edema present.      Comments: Right wrist with splint / sling.   Left wrist / hand with laceration and sutures.    Skin:     General: Skin is warm and dry.      Findings: No rash.   Neurological:      Mental Status: He is alert. Mental status is at baseline.      Cranial Nerves: No cranial nerve deficit.   Psychiatric:         Mood and Affect: Mood normal.         Behavior: Behavior normal.           Recent Labs   Lab Test 10/07/24  0732 2457   HGB 15.8 15.4    186    139   POTASSIUM 3.8 3.7   CR 1.01 0.88   A1C 6.8* 7.4*      Recent Labs   Lab Test 10/07/24  0732 24  0657 24  0651   A1C 6.8* 7.4* 6.9*   LDL 56 57 67   HDL 48 51 49   TRIG 85 85 77   ALT 68 55 61   CR 1.01 0.88 1.00   GFRESTIMATED 80 >90 81   POTASSIUM 3.8 3.7 3.9   TSH 3.47 2.52  --    WBC 7.2 7.2 9.0   HGB 15.8 15.4 16.2    186 179   ALBUMIN 4.1 4.1 4.0       Diagnostics     EKG 2023 - Sinus rhythm, rate 79 bpm  Normal ECG   When compared with ECG of 2021 09:28,   No significant change was found     Revised Cardiac Risk Index (RCRI)  The patient has the following serious cardiovascular risks for perioperative complications:   - No serious cardiac risks = 0 points     RCRI Interpretation: 0 points: Class I (very low risk - 0.4% complication rate)         Signed Electronically by: Alexander Samuels MD  A copy of this evaluation report is provided to the requesting physician.

## 2024-10-24 ENCOUNTER — ANESTHESIA EVENT (OUTPATIENT)
Dept: SURGERY | Facility: OTHER | Age: 69
End: 2024-10-24
Payer: MEDICARE

## 2024-10-25 ENCOUNTER — ANESTHESIA (OUTPATIENT)
Dept: SURGERY | Facility: OTHER | Age: 69
End: 2024-10-25
Payer: MEDICARE

## 2024-10-25 ENCOUNTER — HOSPITAL ENCOUNTER (OUTPATIENT)
Dept: GENERAL RADIOLOGY | Facility: OTHER | Age: 69
Discharge: HOME | End: 2024-10-25
Attending: SPECIALIST | Admitting: SPECIALIST
Payer: MEDICARE

## 2024-10-25 ENCOUNTER — HOSPITAL ENCOUNTER (OUTPATIENT)
Facility: OTHER | Age: 69
Discharge: HOME | End: 2024-10-25
Attending: SPECIALIST | Admitting: SPECIALIST
Payer: MEDICARE

## 2024-10-25 VITALS
SYSTOLIC BLOOD PRESSURE: 136 MMHG | HEART RATE: 98 BPM | BODY MASS INDEX: 39.05 KG/M2 | HEIGHT: 66 IN | OXYGEN SATURATION: 91 % | WEIGHT: 243 LBS | DIASTOLIC BLOOD PRESSURE: 71 MMHG | RESPIRATION RATE: 16 BRPM | TEMPERATURE: 98.1 F

## 2024-10-25 DIAGNOSIS — S52.571A OTHER CLOSED INTRA-ARTICULAR FRACTURE OF DISTAL END OF RIGHT RADIUS, INITIAL ENCOUNTER: Primary | ICD-10-CM

## 2024-10-25 DIAGNOSIS — S62.109A WRIST FRACTURE: ICD-10-CM

## 2024-10-25 LAB
GLUCOSE BLDC GLUCOMTR-MCNC: 110 MG/DL (ref 70–99)
GLUCOSE BLDC GLUCOMTR-MCNC: 133 MG/DL (ref 70–99)

## 2024-10-25 PROCEDURE — 25609 OPTX DST RD XART FX/EP SEP3+: CPT | Performed by: NURSE ANESTHETIST, CERTIFIED REGISTERED

## 2024-10-25 PROCEDURE — 64415 NJX AA&/STRD BRCH PLXS IMG: CPT | Mod: RT

## 2024-10-25 PROCEDURE — 360N000084 HC SURGERY LEVEL 4 W/ FLUORO, PER MIN: Performed by: SPECIALIST

## 2024-10-25 PROCEDURE — 272N000001 HC OR GENERAL SUPPLY STERILE: Performed by: SPECIALIST

## 2024-10-25 PROCEDURE — 250N000011 HC RX IP 250 OP 636: Performed by: SPECIALIST

## 2024-10-25 PROCEDURE — 710N000012 HC RECOVERY PHASE 2, PER MINUTE: Performed by: SPECIALIST

## 2024-10-25 PROCEDURE — 999N000180 XR SURGERY CARM FLUORO LESS THAN 5 MIN: Mod: TC

## 2024-10-25 PROCEDURE — C1713 ANCHOR/SCREW BN/BN,TIS/BN: HCPCS | Performed by: SPECIALIST

## 2024-10-25 PROCEDURE — 250N000011 HC RX IP 250 OP 636

## 2024-10-25 PROCEDURE — 25609 OPTX DST RD XART FX/EP SEP3+: CPT | Mod: RT | Performed by: SPECIALIST

## 2024-10-25 PROCEDURE — 250N000011 HC RX IP 250 OP 636: Performed by: NURSE ANESTHETIST, CERTIFIED REGISTERED

## 2024-10-25 PROCEDURE — 250N000009 HC RX 250: Performed by: NURSE ANESTHETIST, CERTIFIED REGISTERED

## 2024-10-25 PROCEDURE — 999N000141 HC STATISTIC PRE-PROCEDURE NURSING ASSESSMENT: Performed by: SPECIALIST

## 2024-10-25 PROCEDURE — 370N000017 HC ANESTHESIA TECHNICAL FEE, PER MIN: Performed by: SPECIALIST

## 2024-10-25 PROCEDURE — 258N000003 HC RX IP 258 OP 636: Performed by: NURSE ANESTHETIST, CERTIFIED REGISTERED

## 2024-10-25 PROCEDURE — 258N000003 HC RX IP 258 OP 636

## 2024-10-25 PROCEDURE — 250N000009 HC RX 250: Performed by: SPECIALIST

## 2024-10-25 PROCEDURE — 82962 GLUCOSE BLOOD TEST: CPT

## 2024-10-25 DEVICE — SCREW BONE 2.7X18MM: Type: IMPLANTABLE DEVICE | Site: WRIST | Status: FUNCTIONAL

## 2024-10-25 DEVICE — LOCKING SCR T8 FT 2.7MM / L22MM: Type: IMPLANTABLE DEVICE | Site: WRIST | Status: FUNCTIONAL

## 2024-10-25 DEVICE — SCREW LOCKING 2.7X16MM: Type: IMPLANTABLE DEVICE | Site: WRIST | Status: FUNCTIONAL

## 2024-10-25 DEVICE — SCREW LOCKING 2.7X20MM: Type: IMPLANTABLE DEVICE | Site: WRIST | Status: FUNCTIONAL

## 2024-10-25 DEVICE — SCREW LOCKING 2.7X14MM: Type: IMPLANTABLE DEVICE | Site: WRIST | Status: FUNCTIONAL

## 2024-10-25 DEVICE — SCREW BONE 2.7X16MM: Type: IMPLANTABLE DEVICE | Site: WRIST | Status: FUNCTIONAL

## 2024-10-25 DEVICE — IMPLANTABLE DEVICE: Type: IMPLANTABLE DEVICE | Site: WRIST | Status: FUNCTIONAL

## 2024-10-25 RX ORDER — HYDROMORPHONE HCL IN WATER/PF 6 MG/30 ML
0.2 PATIENT CONTROLLED ANALGESIA SYRINGE INTRAVENOUS EVERY 5 MIN PRN
Status: DISCONTINUED | OUTPATIENT
Start: 2024-10-25 | End: 2024-10-25

## 2024-10-25 RX ORDER — DEXAMETHASONE SODIUM PHOSPHATE 10 MG/ML
4 INJECTION, SOLUTION INTRAMUSCULAR; INTRAVENOUS
Status: DISCONTINUED | OUTPATIENT
Start: 2024-10-25 | End: 2024-10-25 | Stop reason: HOSPADM

## 2024-10-25 RX ORDER — CEFAZOLIN SODIUM/WATER 2 G/20 ML
2 SYRINGE (ML) INTRAVENOUS EVERY 8 HOURS
Status: DISCONTINUED | OUTPATIENT
Start: 2024-10-25 | End: 2024-10-25 | Stop reason: HOSPADM

## 2024-10-25 RX ORDER — SODIUM CHLORIDE, SODIUM LACTATE, POTASSIUM CHLORIDE, CALCIUM CHLORIDE 600; 310; 30; 20 MG/100ML; MG/100ML; MG/100ML; MG/100ML
INJECTION, SOLUTION INTRAVENOUS CONTINUOUS
Status: DISCONTINUED | OUTPATIENT
Start: 2024-10-25 | End: 2024-10-25

## 2024-10-25 RX ORDER — FENTANYL CITRATE 50 UG/ML
25 INJECTION, SOLUTION INTRAMUSCULAR; INTRAVENOUS EVERY 5 MIN PRN
Status: DISCONTINUED | OUTPATIENT
Start: 2024-10-25 | End: 2024-10-25

## 2024-10-25 RX ORDER — OXYCODONE HYDROCHLORIDE 5 MG/1
10 TABLET ORAL
Status: DISCONTINUED | OUTPATIENT
Start: 2024-10-25 | End: 2024-10-25 | Stop reason: HOSPADM

## 2024-10-25 RX ORDER — OXYCODONE HYDROCHLORIDE 5 MG/1
5 TABLET ORAL
Status: DISCONTINUED | OUTPATIENT
Start: 2024-10-25 | End: 2024-10-25 | Stop reason: HOSPADM

## 2024-10-25 RX ORDER — DEXAMETHASONE SODIUM PHOSPHATE 10 MG/ML
INJECTION, SOLUTION INTRAMUSCULAR; INTRAVENOUS
Status: COMPLETED | OUTPATIENT
Start: 2024-10-25 | End: 2024-10-25

## 2024-10-25 RX ORDER — BUPIVACAINE HYDROCHLORIDE 5 MG/ML
INJECTION, SOLUTION EPIDURAL; INTRACAUDAL
Status: COMPLETED | OUTPATIENT
Start: 2024-10-25 | End: 2024-10-25

## 2024-10-25 RX ORDER — NALOXONE HYDROCHLORIDE 0.4 MG/ML
0.1 INJECTION, SOLUTION INTRAMUSCULAR; INTRAVENOUS; SUBCUTANEOUS
Status: DISCONTINUED | OUTPATIENT
Start: 2024-10-25 | End: 2024-10-25 | Stop reason: HOSPADM

## 2024-10-25 RX ORDER — LIDOCAINE HYDROCHLORIDE 20 MG/ML
INJECTION, SOLUTION INFILTRATION; PERINEURAL PRN
Status: DISCONTINUED | OUTPATIENT
Start: 2024-10-25 | End: 2024-10-25

## 2024-10-25 RX ORDER — PROPOFOL 10 MG/ML
INJECTION, EMULSION INTRAVENOUS CONTINUOUS PRN
Status: DISCONTINUED | OUTPATIENT
Start: 2024-10-25 | End: 2024-10-25

## 2024-10-25 RX ORDER — HYDROMORPHONE HCL IN WATER/PF 6 MG/30 ML
0.4 PATIENT CONTROLLED ANALGESIA SYRINGE INTRAVENOUS EVERY 5 MIN PRN
Status: DISCONTINUED | OUTPATIENT
Start: 2024-10-25 | End: 2024-10-25

## 2024-10-25 RX ORDER — FENTANYL CITRATE 50 UG/ML
50 INJECTION, SOLUTION INTRAMUSCULAR; INTRAVENOUS EVERY 5 MIN PRN
Status: DISCONTINUED | OUTPATIENT
Start: 2024-10-25 | End: 2024-10-25

## 2024-10-25 RX ORDER — ONDANSETRON 4 MG/1
4 TABLET, ORALLY DISINTEGRATING ORAL EVERY 30 MIN PRN
Status: DISCONTINUED | OUTPATIENT
Start: 2024-10-25 | End: 2024-10-25

## 2024-10-25 RX ORDER — PROPOFOL 10 MG/ML
INJECTION, EMULSION INTRAVENOUS PRN
Status: DISCONTINUED | OUTPATIENT
Start: 2024-10-25 | End: 2024-10-25

## 2024-10-25 RX ORDER — DEXAMETHASONE SODIUM PHOSPHATE 4 MG/ML
4 INJECTION, SOLUTION INTRA-ARTICULAR; INTRALESIONAL; INTRAMUSCULAR; INTRAVENOUS; SOFT TISSUE
Status: DISCONTINUED | OUTPATIENT
Start: 2024-10-25 | End: 2024-10-25

## 2024-10-25 RX ORDER — LIDOCAINE 40 MG/G
CREAM TOPICAL
Status: DISCONTINUED | OUTPATIENT
Start: 2024-10-25 | End: 2024-10-25 | Stop reason: HOSPADM

## 2024-10-25 RX ORDER — ONDANSETRON 2 MG/ML
4 INJECTION INTRAMUSCULAR; INTRAVENOUS EVERY 30 MIN PRN
Status: DISCONTINUED | OUTPATIENT
Start: 2024-10-25 | End: 2024-10-25 | Stop reason: HOSPADM

## 2024-10-25 RX ORDER — ONDANSETRON 4 MG/1
4 TABLET, ORALLY DISINTEGRATING ORAL EVERY 30 MIN PRN
Status: DISCONTINUED | OUTPATIENT
Start: 2024-10-25 | End: 2024-10-25 | Stop reason: HOSPADM

## 2024-10-25 RX ORDER — DEXAMETHASONE SODIUM PHOSPHATE 4 MG/ML
INJECTION, SOLUTION INTRA-ARTICULAR; INTRALESIONAL; INTRAMUSCULAR; INTRAVENOUS; SOFT TISSUE PRN
Status: DISCONTINUED | OUTPATIENT
Start: 2024-10-25 | End: 2024-10-25

## 2024-10-25 RX ORDER — MAGNESIUM HYDROXIDE 1200 MG/15ML
LIQUID ORAL PRN
Status: DISCONTINUED | OUTPATIENT
Start: 2024-10-25 | End: 2024-10-25 | Stop reason: HOSPADM

## 2024-10-25 RX ORDER — SODIUM CHLORIDE, SODIUM LACTATE, POTASSIUM CHLORIDE, CALCIUM CHLORIDE 600; 310; 30; 20 MG/100ML; MG/100ML; MG/100ML; MG/100ML
INJECTION, SOLUTION INTRAVENOUS CONTINUOUS
Status: DISCONTINUED | OUTPATIENT
Start: 2024-10-25 | End: 2024-10-25 | Stop reason: HOSPADM

## 2024-10-25 RX ORDER — HYDROCODONE BITARTRATE AND ACETAMINOPHEN 5; 325 MG/1; MG/1
1-2 TABLET ORAL EVERY 4 HOURS PRN
Qty: 20 TABLET | Refills: 0 | Status: SHIPPED | OUTPATIENT
Start: 2024-10-25

## 2024-10-25 RX ORDER — ONDANSETRON 2 MG/ML
4 INJECTION INTRAMUSCULAR; INTRAVENOUS EVERY 30 MIN PRN
Status: DISCONTINUED | OUTPATIENT
Start: 2024-10-25 | End: 2024-10-25

## 2024-10-25 RX ORDER — NALOXONE HYDROCHLORIDE 0.4 MG/ML
0.1 INJECTION, SOLUTION INTRAMUSCULAR; INTRAVENOUS; SUBCUTANEOUS
Status: DISCONTINUED | OUTPATIENT
Start: 2024-10-25 | End: 2024-10-25

## 2024-10-25 RX ADMIN — SODIUM CHLORIDE, POTASSIUM CHLORIDE, SODIUM LACTATE AND CALCIUM CHLORIDE: 600; 310; 30; 20 INJECTION, SOLUTION INTRAVENOUS at 12:37

## 2024-10-25 RX ADMIN — DEXMEDETOMIDINE HYDROCHLORIDE 20 MCG: 100 INJECTION, SOLUTION INTRAVENOUS at 15:13

## 2024-10-25 RX ADMIN — LIDOCAINE HYDROCHLORIDE 60 MG: 20 INJECTION, SOLUTION INFILTRATION; PERINEURAL at 15:08

## 2024-10-25 RX ADMIN — ONDANSETRON 4 MG: 2 INJECTION INTRAMUSCULAR; INTRAVENOUS at 16:36

## 2024-10-25 RX ADMIN — PROCHLORPERAZINE EDISYLATE 5 MG: 5 INJECTION INTRAMUSCULAR; INTRAVENOUS at 16:51

## 2024-10-25 RX ADMIN — PROPOFOL 140 MCG/KG/MIN: 10 INJECTION, EMULSION INTRAVENOUS at 15:08

## 2024-10-25 RX ADMIN — MIDAZOLAM 2 MG: 1 INJECTION INTRAMUSCULAR; INTRAVENOUS at 13:54

## 2024-10-25 RX ADMIN — Medication 2 G: at 14:51

## 2024-10-25 RX ADMIN — DEXAMETHASONE SODIUM PHOSPHATE 10 MG: 10 INJECTION, SOLUTION INTRAMUSCULAR; INTRAVENOUS at 14:58

## 2024-10-25 RX ADMIN — PROPOFOL 100 MG: 10 INJECTION, EMULSION INTRAVENOUS at 15:08

## 2024-10-25 RX ADMIN — BUPIVACAINE HYDROCHLORIDE 25 ML: 5 INJECTION, SOLUTION EPIDURAL; INTRACAUDAL; PERINEURAL at 14:58

## 2024-10-25 RX ADMIN — DEXAMETHASONE SODIUM PHOSPHATE 8 MG: 4 INJECTION, SOLUTION INTRA-ARTICULAR; INTRALESIONAL; INTRAMUSCULAR; INTRAVENOUS; SOFT TISSUE at 15:16

## 2024-10-25 ASSESSMENT — ACTIVITIES OF DAILY LIVING (ADL)
ADLS_ACUITY_SCORE: 0

## 2024-10-25 NOTE — BRIEF OP NOTE
[facility]    Brief Operative Note    Pre-operative diagnosis: Wrist fracture [S62.109A]  Post-operative diagnosis Same as pre-operative diagnosis    Procedure: Open reduction internal fixation wrist, Right - Wrist    Surgeon: Surgeons and Role:     * Hunter Brantley MD - Primary     * Kapil Pedro PA - Assisting  Anesthesia: MAC with Block   Estimated Blood Loss: None    Drains: None  Specimens: * No specimens in log *  Findings:   None.  Complications: None.  Implants:   Implant Name Type Inv. Item Serial No.  Lot No. LRB No. Used Action   PLATE BONE SHORT HOLEX11 TI INTERMETE RIGHT DORSAL - [id number] Metallic Hardware/[employer] PLATE BONE SHORT HOLEX11 TI INTERMETE RIGHT DORSAL  [employer]  Right 1 Implanted   SCREW BONE 2.7X16MM - [id number] Metallic Hardware/[employer] SCREW BONE 2.7X16MM  [employer]  Right 1 Implanted   SCREW BONE 2.7X18MM - [id number] Metallic Hardware/[employer] SCREW BONE 2.7X18MM  [employer]  Right 1 Implanted   SCREW LOCKING 2.7X20MM - [id number] Metallic Hardware/[employer] SCREW LOCKING 2.7X20MM  [employer]  Right 2 Implanted   LOCKING SCR T8 FT 2.7MM / L22MM - [id number] Metallic Hardware/[employer] LOCKING SCR T8 FT 2.7MM / L22MM  [employer]  Right 2 Implanted   SCREW LOCKING 2.7X16MM - [id number] Metallic Hardware/[employer] SCREW LOCKING 2.7X16MM  [employer]  Right 3 Implanted   SCREW LOCKING 2.7X14MM - [id number] Metallic Hardware/[employer] SCREW LOCKING 2.7X14MM  [employer]  Right 2 Implanted

## 2024-10-25 NOTE — INTERVAL H&P NOTE
".hpa    Clinical Conditions Present on Arrival:  Clinically Significant Risk Factors Present on Admission                  # Drug Induced Platelet Defect: home medication list includes an antiplatelet medication      # DMII: A1C = 6.8 % (Ref range: <5.7 %) within past 6 months  # Obesity: Estimated body mass index is 39.25 kg/m  as calculated from the following:    Height as of 10/22/24: 1.676 m (5' 6\").    Weight as of 10/22/24: 110.3 kg (243 lb 3.2 oz).       "

## 2024-10-25 NOTE — ANESTHESIA CARE TRANSFER NOTE
Patient: Cecilio Nolan    Procedure: Procedure(s):  Open reduction internal fixation wrist       Diagnosis: Wrist fracture [S62.109A]  Diagnosis Additional Information: No value filed.    Anesthesia Type:   MAC     Note:    Oropharynx: oropharynx clear of all foreign objects and spontaneously breathing  Level of Consciousness: drowsy  Oxygen Supplementation: face mask  Level of Supplemental Oxygen (L/min / FiO2): 6  Independent Airway: airway patency satisfactory and stable  Dentition: dentition unchanged  Vital Signs Stable: post-procedure vital signs reviewed and stable  Report to RN Given: handoff report given  Patient transferred to: Phase II    Handoff Report: Identifed the Patient, Identified the Reponsible Provider, Reviewed the pertinent medical history, Discussed the surgical course, Reviewed Intra-OP anesthesia mangement and issues during anesthesia, Set expectations for post-procedure period and Allowed opportunity for questions and acknowledgement of understanding      Vitals:  Vitals Value Taken Time   BP     Temp     Pulse     Resp     SpO2         Electronically Signed By: AMELIA POWELL CRNA  October 25, 2024  4:30 PM

## 2024-10-25 NOTE — ANESTHESIA PREPROCEDURE EVALUATION
Anesthesia Pre-Procedure Evaluation    Patient: Cecilio Nolan   MRN: [id number] : 1955        Procedure : Procedure(s):  Open reduction internal fixation wrist          Past Medical History:   Diagnosis Date    Adenomatous colon polyp     F/U due in     Coronary artery calcification     Essential (primary) hypertension     No Comments Provided    Hyperlipidemia     No Comments Provided    Obesity 2018    Overview:  BMI: 34    Psoriasis     No Comments Provided    Transaminitis     negative liver biopsy      Past Surgical History:   Procedure Laterality Date    COLONOSCOPY  2010    WNL Q10yrs.    COLONOSCOPY N/A 2020    Due 2025 Procedure: COLONOSCOPY, WITH POLYPECTOMY AND BIOPSY;  Surgeon: Eliazar Oakes MD;  Location: [facility] OR    HERNIA REPAIR Right 2009    LIVER BIOPSY        No Known Allergies   Social History     Tobacco Use    Smoking status: Former     Current packs/day: 0.00     Types: Cigarettes     Quit date: 1/15/2003     Years since quittin.7    Smokeless tobacco: Never   Substance Use Topics    Alcohol use: Yes     Alcohol/week: 2.0 standard drinks of alcohol     Comment: Occasional      Wt Readings from Last 1 Encounters:   10/25/24 110.2 kg (243 lb)        Anesthesia Evaluation   Pt has had prior anesthetic. Type: General (hx of hernia surgery, no anesthetic concerns or issues.).        ROS/MED HX  ENT/Pulmonary: Comment: Hasn't used rescue inhaler in last 6 months.     (+)                     Intermittent, asthma  Treatment: Inhaler prn,                 Neurologic:  - neg neurologic ROS     Cardiovascular:     (+) Dyslipidemia hypertension- -  CAD -  - -                                 Previous cardiac testing   Echo: Date: Results:  Procedure   Stress Echo Bike with two dimensional, color and spectral Doppler performed.   Contrast Definity.   ______________________________________________________________________________   Interpretation Summary       Exercise stress echocardiogram      No angina symptoms during stress test.   Target heart rate achieved. Hypertensive response to exercise.   ECG portion of stress test will be reported separately.   Normal segmental and global LV function with EF of approximately 55-60% at   rest; with stress left ventricular cavity size decreases and LVEF increases to   65-70%.   No stress induced regional wall motion abnormalities.   Average functional capacity for age.      No significant valvular dysfunction noted on screening 2D and Doppler   examination.     Stress Test:  Date: Results:    ECG Reviewed:  Date: Results:  79 Sinus rhythm   Normal ECG   When compared with ECG of 26-JUL-2021 09:28,   No significant change was found   Confirmed by Tony Sow ([id number]) on 2/24/2023 4:52:09 PM   Also confirmed by Tony Sow ([id number]),  Berkley Ramirez ([id number])  on 2/27/2023 8:20:06 AM     Cath:  Date: Results:      METS/Exercise Tolerance: >4 METS    Hematologic:  - neg hematologic  ROS     Musculoskeletal:  - neg musculoskeletal ROS     GI/Hepatic:  - neg GI/hepatic ROS     Renal/Genitourinary:     (+) renal disease,             Endo:     (+)  type II DM,   Not using insulin, - not using insulin pump.  not previously admitted for DM/DKA.       Obesity,       Psychiatric/Substance Use:  - neg psychiatric ROS     Infectious Disease:  - neg infectious disease ROS     Malignancy:  - neg malignancy ROS     Other:  - neg other ROS   (-) Any chance pregnant       Physical Exam    Airway      Comment: Thick neck    Mallampati: III   TM distance: > 3 FB   Neck ROM: full   Mouth opening: > 3 cm    Respiratory Devices and Support         Dental       (+) Completely normal teeth      Cardiovascular          Rhythm and rate: regular and normal     Pulmonary   pulmonary exam normal        (+) wheezes           OUTSIDE LABS:  CBC:   Lab Results   Component Value Date    WBC 7.2 10/07/2024    WBC 7.2 07/02/2024    HGB 15.8 10/07/2024    HGB  "15.4 07/02/2024    HCT 47.5 10/07/2024    HCT 46.6 07/02/2024     10/07/2024     07/02/2024     BMP:   Lab Results   Component Value Date     10/07/2024     07/02/2024    POTASSIUM 3.8 10/07/2024    POTASSIUM 3.7 07/02/2024    CHLORIDE 100 10/07/2024    CHLORIDE 102 07/02/2024    CO2 27 10/07/2024    CO2 29 07/02/2024    BUN 15.4 10/07/2024    BUN 14.3 07/02/2024    CR 1.01 10/07/2024    CR 0.88 07/02/2024     (H) 10/25/2024     (H) 10/07/2024     COAGS: No results found for: \"PTT\", \"INR\", \"FIBR\"  POC: No results found for: \"BGM\", \"HCG\", \"HCGS\"  HEPATIC:   Lab Results   Component Value Date    ALBUMIN 4.1 10/07/2024    PROTTOTAL 6.9 10/07/2024    ALT 68 10/07/2024    AST 53 (H) 10/07/2024    ALKPHOS 104 10/07/2024    BILITOTAL 0.4 10/07/2024     OTHER:   Lab Results   Component Value Date    A1C 6.8 (H) 10/07/2024    DEON 8.9 10/07/2024    TSH 3.47 10/07/2024       Anesthesia Plan    ASA Status:  3    NPO Status:  NPO Appropriate    Anesthesia Type: MAC (consent for GA LMA if needed).     - Reason for MAC: straight local not clinically adequate   Induction: Intravenous, Propofol.   Maintenance: Balanced.        Consents    Anesthesia Plan(s) and associated risks, benefits, and realistic alternatives discussed. Questions answered and patient/representative(s) expressed understanding.     - Discussed: Risks, Benefits and Alternatives for BOTH SEDATION and the PROCEDURE were discussed     - Discussed with:  Patient, Spouse      - Specific Concerns: Aspiration.     - Extended Intubation/Ventilatory Support Discussed: No.      - Patient is DNR/DNI Status: No     Use of blood products discussed: No .     Postoperative Care    Pain management: Multi-modal analgesia, Peripheral nerve block (Single Shot) (Ultrasound guided infaclavicular brachial plexus nerve block).   PONV prophylaxis: Dexamethasone or Solumedrol, Ondansetron (or other 5HT-3)     Comments:               David Shore, " "APRN CRNA    I have reviewed the pertinent notes and labs in the chart from the past 30 days and (re)examined the patient.  Any updates or changes from those notes are reflected in this note.             # Drug Induced Platelet Defect: home medication list includes an antiplatelet medication   # Hypertension: Noted on problem list        # DMII: A1C = 6.8 % (Ref range: <5.7 %) within past 6 months    # Obesity: Estimated body mass index is 39.22 kg/m  as calculated from the following:    Height as of this encounter: 1.676 m (5' 6\").    Weight as of this encounter: 110.2 kg (243 lb).             "

## 2024-10-25 NOTE — OP NOTE
Preoperative diagnosis: Place intra-articular fracture right distal radius    Postoperative diagnosis: Same    Procedure performed: Open reduction internal fixation right distal eulkxk-vlliu-svfbyrdoz-3 or more fragments    Surgeon: Hunter Brantley MD    Assistant: Kapil FORBES    Anesthesia: Regional with sedation    Indications: 69-year-old male with a displaced intra-articular fracture of the right distal radius surgical treatment was recommended and he elected to proceed.    Procedure: Patient was brought the operating placed the operating table a preoperative block was placed for pain control.  The patient's right upper extremity was prepped and draped normal sterile manner with a tourniquet placed proximally.  Timeout procedure was performed confirming surgical site patient needed procedure.  Patient received antibiotics as per protocol.  The right upper extremities and elevated exsanguinated and the tourniquet was inflated to 250 mmHg.  A standard volar approach to the wrist was made carried sharply down through skin through subcutaneous tissue.  The chair fascia was incised and beneath the floor of the FCR sheath.  Pronator quadratus was released.  First dorsal compartment was released brachioradialis was released.  The fracture was then reduced and held with the pointed reduction forceps.  A medium sized medium length [place] locking plate was positioned in its position was confirmed radiographically with fluoroscopy.  The fracture was held in reduced position nail plate screw was filled.  Following this sequential's screws were placed with intermittent fluoroscopy images.  When all screws were placed AP lateral and 30 degree oblique fusion anatomic reduction of the fracture.  Wounds were then irrigated skin was closed with interrupted 4 nylon suture a splint was applied and patient was brought to recovery room stable condition.

## 2024-10-25 NOTE — DISCHARGE INSTRUCTIONS
Surgeon Contact Information  If you have questions or concerns related to your procedure please contact your surgeon through [facility] at [phone].     [facility]  Adult Discharge Orders & Instructions    ________________________________________________________________          For 12 hours after surgery  Get plenty of rest.  A responsible adult must stay with you for at least 12 hours after you leave the hospital.   You may feel lightheaded.  IF so, sit for a few minutes before standing.  Have someone help you get up.   You may have a slight fever. Call the doctor if your fever is over 101 F (38.3 C) (taken under the tongue) or lasts longer than 24 hours.  You may have a dry mouth, a sore throat, muscle aches or trouble sleeping.  These should go away after 24 hours.  Do not make important or legal decisions.  6.   Do not drive or use heavy equipment.  If you have weakness or tingling, don't drive or use heavy equipment until this feeling goes away.    To contact a doctor, call   [phone]_______________________

## 2024-10-25 NOTE — OR NURSING
Prior to the start of the procedure and with procedural staff participation, I verbally confirmed the patient s identity using two indicators, relevant allergies, that the procedure was appropriate and matched the consent or emergent situation, and that the correct equipment/implants were available. Immediately prior to starting the procedure I conducted the Time Out with the procedural staff and re-confirmed the patient s name, procedure, and site/side. (The Joint Commission universal protocol was followed.)  Yes    Arlette Franco RN on 10/25/2024 at 2:03 PM

## 2024-10-25 NOTE — CONSULTS
Surgical Clinic Consult  Primary physician:     Alexander Samuels      History of present illness:  This is a 69 year old right hand dominant male I am seeing in consultation for right wrist injury.  Patient is retired he fell injuring his wrist.    Past medical history:   Past Medical History:   Diagnosis Date    Adenomatous colon polyp     F/U due in     Coronary artery calcification     Essential (primary) hypertension     No Comments Provided    Hyperlipidemia     No Comments Provided    Obesity 2018    Overview:  BMI: 34    Psoriasis     No Comments Provided    Transaminitis     negative liver biopsy       Pastsurgical history:  Past Surgical History:   Procedure Laterality Date    COLONOSCOPY  2010    WNL Q10yrs.    COLONOSCOPY N/A 2020    Due 2025 Procedure: COLONOSCOPY, WITH POLYPECTOMY AND BIOPSY;  Surgeon: Eliazar Oakes MD;  Location: [facility] OR    HERNIA REPAIR Right 2009    LIVER BIOPSY         Current medications:  No current outpatient medications on file.       Allergies:  No Known Allergies    Family history:  Family History   Problem Relation Age of Onset    Arthritis Mother         Psoriatic arthritis    Diabetes Father     Other - See Comments Father         adenomatous colonic polyps    Hyperlipidemia Father     Heart Disease Father         CHF    Family History Negative Sister         Good Health    Heart Disease Brother         CABG    Heart Disease Brother         CABG    Heart Disease Brother         MI  @60    Other - See Comments Brother         Suicide    Heart Disease Brother         CABG    Coronary Artery Disease Brother     Asthma Brother        Social history:  Social History     Socioeconomic History    Marital status: Single     Spouse name: Not on file    Number of children: Not on file    Years of education: Not on file    Highest education level: Not on file   Occupational History    Not on file   Tobacco Use    Smoking status: Former     Current  packs/day: 0.00     Types: Cigarettes     Quit date: 1/15/2003     Years since quittin.7    Smokeless tobacco: Never   Vaping Use    Vaping status: Never Used   Substance and Sexual Activity    Alcohol use: Yes     Alcohol/week: 2.0 standard drinks of alcohol     Comment: Occasional    Drug use: Yes     Types: Marijuana     Comment: Drug use: NoMarijuana occasionally    Sexual activity: Not Currently   Other Topics Concern    Parent/sibling w/ CABG, MI or angioplasty before 65F 55M? Not Asked   Social History Narrative    He is single, worked at [employer], retired 2021.  He lives in [place].    Likes walking, reading.     Has a dog he walks. She is 12.      Social Drivers of Health     Financial Resource Strain: Low Risk  (2024)    Financial Resource Strain     Within the past 12 months, have you or your family members you live with been unable to get utilities (heat, electricity) when it was really needed?: No   Food Insecurity: Low Risk  (2024)    Food Insecurity     Within the past 12 months, did you worry that your food would run out before you got money to buy more?: No     Within the past 12 months, did the food you bought just not last and you didn t have money to get more?: No   Transportation Needs: Low Risk  (2024)    Transportation Needs     Within the past 12 months, has lack of transportation kept you from medical appointments, getting your medicines, non-medical meetings or appointments, work, or from getting things that you need?: No   Physical Activity: Not on file   Stress: Not on file   Social Connections: Not on file   Interpersonal Safety: Not At Risk (10/18/2024)    Received from [facility] and [facility]     IP Custom IPV     Do you feel UNSAFE in any of your personal relationships with your family members or any other acquaintances?: No   Housing Stability: Low Risk  (2024)    Housing Stability     Do you have housing? : Yes     Are you  worried about losing your housing?: No       PROBLEM LIST:  Patient Active Problem List   Diagnosis    Nonspecific abnormal results of liver function study    Psoriasis    Class 2 severe obesity due to excess calories with serious comorbidity and body mass index (BMI) of 39.0 to 39.9 in adult (H)    Coronary artery calcification    History of 2019 novel coronavirus disease (COVID-19)    Lower urinary tract symptoms    Type 2 diabetes mellitus with stage 2 chronic kidney disease, without long-term current use of insulin (H)    Benign essential hypertension    Mixed hyperlipidemia    Immunosuppressed status (H) -- Humira -- For Psoriasis       Review of Systems:  COMPLETE 12 point REVIEW OF SYSTEMS is otherwise negative with the exception of which is stated above.    Physical exam: There were no vitals taken for this visit.    General: this is a pleasant male patient in no acute distress.  Patient is awake alert and oriented x3 .  Well-groomed, well kempt.  EXAM:  Musculoskeletal: Examination shows a sugar-tong splint to be applied.  The patient's limitation to light touch.    Imaging: X-rays reviewed which reveal a displaced intra-articular fracture of the right distal radius.    Assessment:   Displaced right distal radius fracture 3 or more provide 1.    Plan:    Open reduction internal fixation of this complication.  Extremities performed today.      Hunter Brantley MD

## 2024-10-25 NOTE — ANESTHESIA POSTPROCEDURE EVALUATION
Patient: Cecilio Nolan    Procedure: Procedure(s):  Open reduction internal fixation wrist       Anesthesia Type:  MAC    Note:  Disposition: Outpatient   Postop Pain Control: Uneventful            Sign Out: Well controlled pain   PONV: No   Neuro/Psych: Uneventful            Sign Out: Acceptable/Baseline neuro status   Airway/Respiratory: Uneventful            Sign Out: Acceptable/Baseline resp. status   CV/Hemodynamics: Uneventful            Sign Out: Acceptable CV status; No obvious hypovolemia; No obvious fluid overload   Other NRE: NONE   DID A NON-ROUTINE EVENT OCCUR? No           Last vitals:  Vitals Value Taken Time   /99 10/25/24 1715   Temp 97.5  F (36.4  C) 10/25/24 1628   Pulse 82 10/25/24 1715   Resp 18 10/25/24 1628   SpO2 92 % 10/25/24 1723   Vitals shown include unfiled device data.    Electronically Signed By: AMELIA Elmore CRNA  October 25, 2024  5:26 PM

## 2024-10-25 NOTE — ANESTHESIA PROCEDURE NOTES
Brachial plexus Procedure Note    Pre-Procedure   Staff -        CRNA: David Shore APRN CRNA       Performed By: CRNA       Location: pre-op       Procedure Start/Stop Times: 10/25/2024 1:58 PM and 10/25/2024 2:02 PM       Pre-Anesthestic Checklist: patient identified, IV checked, site marked, risks and benefits discussed, informed consent, monitors and equipment checked, pre-op evaluation, at physician/surgeon's request and post-op pain management  Timeout:       Correct Patient: Yes        Correct Procedure: Yes        Correct Site: Yes        Correct Position: Yes        Correct Laterality: Yes        Site Marked: Yes  Procedure Documentation  Procedure: Brachial plexus       Diagnosis: POST OPERATIVE PAIN MANAGEMENT       Laterality: right       Patient Position: sitting       Skin prep: Chloraprep       Local skin infiltrated with 0.5 mL of 1% lidocaine.  (infra-clavicular approach).       Needle Type: insulated and short bevel       Needle Gauge: 20.        Needle Length (Inches): 4        Ultrasound guided       1. Ultrasound was used to identify targeted nerve, plexus, vascular marker, or fascial plane and place a needle adjacent to it in real-time.       2. Ultrasound was used to visualize the spread of anesthetic in close proximity to the above referenced structure.       3. A permanent image is entered into the patient's record.       4. The visualized anatomic structures appeared normal.       5. There were no apparent abnormal pathologic findings.    Assessment/Narrative         The placement was negative for: blood aspirated, painful injection and site bleeding       Paresthesias: No.       Bolus given via needle..        Secured via.        Insertion/Infusion Method: Single Shot       Complications: none       Injection made incrementally with aspirations every 5 mL.    Medication(s) Administered   Bupivacaine 0.5% PF (Infiltration) - Infiltration   25 mL - 10/25/2024 2:58:00 PM  Dexamethasone 10  "mg/mL PF (Perineural) - Perineural   10 mg - 10/25/2024 2:58:00 PM  Medication Administration Time: 10/25/2024 1:58 PM      FOR [facility] ONLY:   Pain Team Contact information: please page the Pain Team Via [employer]. Search \"Pain\". During daytime hours, please page the attending first. At night please page the resident first.      "

## 2024-10-26 NOTE — PROGRESS NOTES
Discharge Note      Data:  Cecilio Nolan discharged to home at 2050 via wheel chair. Accompanied by other: sister in law and staff.    Action:  Written discharge/follow-up instructions were provided to patient. Prescriptions sent to patients preferred pharmacy. All belongings sent with patient.    Response:  Pt verbalized understanding of discharge instructions, reason for discharge, and necessary follow-up appointments.     Pt stable to discharge. Contacted Dr. Anel Larson due to patient oxygen decreasing to 85% for a brief period of time and then increasing to 95% on RA after titrating off of 1L/NC. Pt states that he has had times where he feels SOB at home and has been told he needs a sleep study. MD stated to notify pt of needs to follow up with PCP regarding sleep study. MD ok to discharge at this time.

## 2024-10-29 DIAGNOSIS — Z87.81 S/P ORIF (OPEN REDUCTION INTERNAL FIXATION) FRACTURE: Primary | ICD-10-CM

## 2024-10-29 DIAGNOSIS — Z98.890 S/P ORIF (OPEN REDUCTION INTERNAL FIXATION) FRACTURE: Primary | ICD-10-CM

## 2024-11-01 ENCOUNTER — OFFICE VISIT (OUTPATIENT)
Dept: ORTHOPEDICS | Facility: OTHER | Age: 69
End: 2024-11-01
Attending: SPECIALIST
Payer: MEDICARE

## 2024-11-01 ENCOUNTER — HOSPITAL ENCOUNTER (OUTPATIENT)
Dept: GENERAL RADIOLOGY | Facility: OTHER | Age: 69
Discharge: HOME | End: 2024-11-01
Attending: SPECIALIST
Payer: MEDICARE

## 2024-11-01 DIAGNOSIS — Z98.890 S/P ORIF (OPEN REDUCTION INTERNAL FIXATION) FRACTURE: ICD-10-CM

## 2024-11-01 DIAGNOSIS — Z87.81 S/P ORIF (OPEN REDUCTION INTERNAL FIXATION) FRACTURE: Primary | ICD-10-CM

## 2024-11-01 DIAGNOSIS — Z98.890 S/P ORIF (OPEN REDUCTION INTERNAL FIXATION) FRACTURE: Primary | ICD-10-CM

## 2024-11-01 DIAGNOSIS — Z87.81 S/P ORIF (OPEN REDUCTION INTERNAL FIXATION) FRACTURE: ICD-10-CM

## 2024-11-01 PROCEDURE — G0463 HOSPITAL OUTPT CLINIC VISIT: HCPCS

## 2024-11-01 PROCEDURE — 73110 X-RAY EXAM OF WRIST: CPT | Mod: RT

## 2024-11-01 PROCEDURE — 99024 POSTOP FOLLOW-UP VISIT: CPT | Performed by: SPECIALIST

## 2024-11-01 NOTE — PROGRESS NOTES
Subjective:    Patient returns for follow-up 1 week status post ORIF of a right distal radius fracture doing well    Objective:    Incisions healing nicely he has mild digital swelling range of motion mildly reduced  Imaging:     3 views of the right wrist reveal anatomic reduction of the fracture with no evidence of further abnormality  Assessment:    1 week status post right distal radius    Plan:    Placed him in the removable splint 5 pound lifting restriction follow-up in 5 weeks with repeat x-rays.    Hunter Brantley MD    
Home

## 2024-11-30 ENCOUNTER — HEALTH MAINTENANCE LETTER (OUTPATIENT)
Age: 69
End: 2024-11-30

## 2024-12-04 DIAGNOSIS — Z98.890 S/P ORIF (OPEN REDUCTION INTERNAL FIXATION) FRACTURE: Primary | ICD-10-CM

## 2024-12-04 DIAGNOSIS — Z87.81 S/P ORIF (OPEN REDUCTION INTERNAL FIXATION) FRACTURE: Primary | ICD-10-CM

## 2024-12-06 ENCOUNTER — HOSPITAL ENCOUNTER (OUTPATIENT)
Dept: GENERAL RADIOLOGY | Facility: OTHER | Age: 69
Discharge: HOME | End: 2024-12-06
Attending: SPECIALIST
Payer: MEDICARE

## 2024-12-06 DIAGNOSIS — Z87.81 S/P ORIF (OPEN REDUCTION INTERNAL FIXATION) FRACTURE: ICD-10-CM

## 2024-12-06 DIAGNOSIS — Z98.890 S/P ORIF (OPEN REDUCTION INTERNAL FIXATION) FRACTURE: ICD-10-CM

## 2024-12-06 PROCEDURE — 73110 X-RAY EXAM OF WRIST: CPT | Mod: RT

## 2025-01-12 ENCOUNTER — HEALTH MAINTENANCE LETTER (OUTPATIENT)
Age: 70
End: 2025-01-12

## 2025-01-15 ENCOUNTER — LAB (OUTPATIENT)
Dept: LAB | Facility: OTHER | Age: 70
End: 2025-01-15
Attending: INTERNAL MEDICINE
Payer: MEDICARE

## 2025-01-15 ENCOUNTER — OFFICE VISIT (OUTPATIENT)
Dept: INTERNAL MEDICINE | Facility: OTHER | Age: 70
End: 2025-01-15
Attending: INTERNAL MEDICINE
Payer: MEDICARE

## 2025-01-15 ENCOUNTER — HOSPITAL ENCOUNTER (OUTPATIENT)
Dept: GENERAL RADIOLOGY | Facility: OTHER | Age: 70
Discharge: HOME | End: 2025-01-15
Attending: INTERNAL MEDICINE
Payer: MEDICARE

## 2025-01-15 VITALS
HEART RATE: 85 BPM | SYSTOLIC BLOOD PRESSURE: 118 MMHG | HEIGHT: 67 IN | RESPIRATION RATE: 15 BRPM | WEIGHT: 238 LBS | DIASTOLIC BLOOD PRESSURE: 82 MMHG | OXYGEN SATURATION: 92 % | BODY MASS INDEX: 37.35 KG/M2 | TEMPERATURE: 96.4 F

## 2025-01-15 DIAGNOSIS — N18.2 TYPE 2 DIABETES MELLITUS WITH STAGE 2 CHRONIC KIDNEY DISEASE, WITHOUT LONG-TERM CURRENT USE OF INSULIN (H): ICD-10-CM

## 2025-01-15 DIAGNOSIS — Z71.85 VACCINE COUNSELING: ICD-10-CM

## 2025-01-15 DIAGNOSIS — Z00.00 MEDICARE ANNUAL WELLNESS VISIT, SUBSEQUENT: Primary | ICD-10-CM

## 2025-01-15 DIAGNOSIS — I10 BENIGN ESSENTIAL HYPERTENSION: ICD-10-CM

## 2025-01-15 DIAGNOSIS — E78.2 MIXED HYPERLIPIDEMIA: ICD-10-CM

## 2025-01-15 DIAGNOSIS — D84.9 IMMUNOSUPPRESSED STATUS: ICD-10-CM

## 2025-01-15 DIAGNOSIS — M25.522 LEFT ELBOW PAIN: ICD-10-CM

## 2025-01-15 DIAGNOSIS — E11.22 TYPE 2 DIABETES MELLITUS WITH STAGE 2 CHRONIC KIDNEY DISEASE, WITHOUT LONG-TERM CURRENT USE OF INSULIN (H): ICD-10-CM

## 2025-01-15 DIAGNOSIS — I25.10 CORONARY ARTERY CALCIFICATION: ICD-10-CM

## 2025-01-15 DIAGNOSIS — L40.9 PSORIASIS: ICD-10-CM

## 2025-01-15 DIAGNOSIS — E66.01 CLASS 2 SEVERE OBESITY DUE TO EXCESS CALORIES WITH SERIOUS COMORBIDITY AND BODY MASS INDEX (BMI) OF 37.0 TO 37.9 IN ADULT (H): ICD-10-CM

## 2025-01-15 DIAGNOSIS — M77.12 LATERAL EPICONDYLITIS OF LEFT ELBOW: ICD-10-CM

## 2025-01-15 DIAGNOSIS — G47.9 SLEEP DIFFICULTIES: ICD-10-CM

## 2025-01-15 DIAGNOSIS — E66.812 CLASS 2 SEVERE OBESITY DUE TO EXCESS CALORIES WITH SERIOUS COMORBIDITY AND BODY MASS INDEX (BMI) OF 37.0 TO 37.9 IN ADULT (H): ICD-10-CM

## 2025-01-15 LAB
ALBUMIN SERPL BCG-MCNC: 4.1 G/DL (ref 3.5–5.2)
ALBUMIN UR-MCNC: NEGATIVE MG/DL
ALP SERPL-CCNC: 117 U/L (ref 40–150)
ALT SERPL W P-5'-P-CCNC: 62 U/L (ref 0–70)
ANION GAP SERPL CALCULATED.3IONS-SCNC: 12 MMOL/L (ref 7–15)
APPEARANCE UR: CLEAR
AST SERPL W P-5'-P-CCNC: 40 U/L (ref 0–45)
BILIRUB SERPL-MCNC: 0.5 MG/DL
BILIRUB UR QL STRIP: NEGATIVE
BUN SERPL-MCNC: 16.5 MG/DL (ref 8–23)
CALCIUM SERPL-MCNC: 9.1 MG/DL (ref 8.8–10.4)
CHLORIDE SERPL-SCNC: 104 MMOL/L (ref 98–107)
CHOLEST SERPL-MCNC: 143 MG/DL
COLOR UR AUTO: YELLOW
CREAT SERPL-MCNC: 0.95 MG/DL (ref 0.67–1.17)
CREAT UR-MCNC: 234.4 MG/DL
EGFRCR SERPLBLD CKD-EPI 2021: 86 ML/MIN/1.73M2
ERYTHROCYTE [DISTWIDTH] IN BLOOD BY AUTOMATED COUNT: 12.8 % (ref 10–15)
EST. AVERAGE GLUCOSE BLD GHB EST-MCNC: 151 MG/DL
FASTING STATUS PATIENT QL REPORTED: YES
FASTING STATUS PATIENT QL REPORTED: YES
GLUCOSE SERPL-MCNC: 146 MG/DL (ref 70–99)
GLUCOSE UR STRIP-MCNC: NEGATIVE MG/DL
HBA1C MFR BLD: 6.9 %
HCO3 SERPL-SCNC: 25 MMOL/L (ref 22–29)
HCT VFR BLD AUTO: 48.4 % (ref 40–53)
HDLC SERPL-MCNC: 52 MG/DL
HGB BLD-MCNC: 16.6 G/DL (ref 13.3–17.7)
HGB UR QL STRIP: NEGATIVE
KETONES UR STRIP-MCNC: NEGATIVE MG/DL
LDLC SERPL CALC-MCNC: 73 MG/DL
LEUKOCYTE ESTERASE UR QL STRIP: NEGATIVE
MCH RBC QN AUTO: 31.9 PG (ref 26.5–33)
MCHC RBC AUTO-ENTMCNC: 34.3 G/DL (ref 31.5–36.5)
MCV RBC AUTO: 93 FL (ref 78–100)
MICROALBUMIN UR-MCNC: 18.7 MG/L
MICROALBUMIN/CREAT UR: 7.98 MG/G CR (ref 0–17)
NITRATE UR QL: NEGATIVE
NONHDLC SERPL-MCNC: 91 MG/DL
PH UR STRIP: 5 [PH] (ref 5–9)
PLATELET # BLD AUTO: 193 10E3/UL (ref 150–450)
POTASSIUM SERPL-SCNC: 3.9 MMOL/L (ref 3.4–5.3)
PROT SERPL-MCNC: 6.9 G/DL (ref 6.4–8.3)
RBC # BLD AUTO: 5.21 10E6/UL (ref 4.4–5.9)
SODIUM SERPL-SCNC: 141 MMOL/L (ref 135–145)
SP GR UR STRIP: 1.03 (ref 1–1.03)
TRIGL SERPL-MCNC: 90 MG/DL
TSH SERPL DL<=0.005 MIU/L-ACNC: 2.6 UIU/ML (ref 0.3–4.2)
UROBILINOGEN UR STRIP-MCNC: NORMAL MG/DL
WBC # BLD AUTO: 9 10E3/UL (ref 4–11)

## 2025-01-15 PROCEDURE — 83036 HEMOGLOBIN GLYCOSYLATED A1C: CPT | Mod: ZL

## 2025-01-15 PROCEDURE — 82465 ASSAY BLD/SERUM CHOLESTEROL: CPT | Mod: ZL

## 2025-01-15 PROCEDURE — 36415 COLL VENOUS BLD VENIPUNCTURE: CPT | Mod: ZL

## 2025-01-15 PROCEDURE — 73080 X-RAY EXAM OF ELBOW: CPT | Mod: LT

## 2025-01-15 PROCEDURE — 84443 ASSAY THYROID STIM HORMONE: CPT | Mod: ZL

## 2025-01-15 PROCEDURE — 85041 AUTOMATED RBC COUNT: CPT | Mod: ZL

## 2025-01-15 PROCEDURE — 85014 HEMATOCRIT: CPT | Mod: ZL

## 2025-01-15 PROCEDURE — 84520 ASSAY OF UREA NITROGEN: CPT | Mod: ZL

## 2025-01-15 PROCEDURE — 82043 UR ALBUMIN QUANTITATIVE: CPT | Mod: ZL

## 2025-01-15 PROCEDURE — 81003 URINALYSIS AUTO W/O SCOPE: CPT | Mod: ZL

## 2025-01-15 PROCEDURE — 80061 LIPID PANEL: CPT | Mod: ZL

## 2025-01-15 PROCEDURE — 82570 ASSAY OF URINE CREATININE: CPT | Mod: ZL

## 2025-01-15 RX ORDER — ROSUVASTATIN CALCIUM 20 MG/1
20 TABLET, COATED ORAL DAILY
Qty: 90 TABLET | Refills: 4 | Status: SHIPPED | OUTPATIENT
Start: 2025-01-15

## 2025-01-15 RX ORDER — METOPROLOL SUCCINATE 50 MG/1
50 TABLET, EXTENDED RELEASE ORAL DAILY
Qty: 90 TABLET | Refills: 4 | Status: SHIPPED | OUTPATIENT
Start: 2025-01-15

## 2025-01-15 RX ORDER — USTEKINUMAB 90 MG/ML
INJECTION, SOLUTION SUBCUTANEOUS
Qty: 3 ML | Refills: 1 | Status: SHIPPED | OUTPATIENT
Start: 2025-01-15 | End: 2026-03-12

## 2025-01-15 RX ORDER — LOSARTAN POTASSIUM AND HYDROCHLOROTHIAZIDE 25; 100 MG/1; MG/1
1 TABLET ORAL DAILY
Qty: 90 TABLET | Refills: 4 | Status: SHIPPED | OUTPATIENT
Start: 2025-01-15

## 2025-01-15 SDOH — HEALTH STABILITY: PHYSICAL HEALTH: ON AVERAGE, HOW MANY DAYS PER WEEK DO YOU ENGAGE IN MODERATE TO STRENUOUS EXERCISE (LIKE A BRISK WALK)?: 1 DAY

## 2025-01-15 ASSESSMENT — ENCOUNTER SYMPTOMS
CHILLS: 0
WOUND: 0
DYSURIA: 0
WHEEZING: 0
FEVER: 0
ABDOMINAL PAIN: 0
CONFUSION: 0
DIZZINESS: 0
ARTHRALGIAS: 1
AGITATION: 0
HEMATURIA: 0
BRUISES/BLEEDS EASILY: 0
SHORTNESS OF BREATH: 0
SLEEP DISTURBANCE: 1
DIARRHEA: 0
COUGH: 0
LIGHT-HEADEDNESS: 0
VOMITING: 0
NAUSEA: 0
MYALGIAS: 0

## 2025-01-15 ASSESSMENT — PAIN SCALES - GENERAL: PAINLEVEL_OUTOF10: NO PAIN (0)

## 2025-01-15 ASSESSMENT — SOCIAL DETERMINANTS OF HEALTH (SDOH): HOW OFTEN DO YOU GET TOGETHER WITH FRIENDS OR RELATIVES?: TWICE A WEEK

## 2025-01-15 NOTE — PROGRESS NOTES
Assessment & Plan     ICD-10-CM    1. Medicare annual wellness visit, subsequent  Z00.00       2. Vaccine counseling  Z71.85       3. Type 2 diabetes mellitus with stage 2 chronic kidney disease, without long-term current use of insulin (H)  E11.22 metFORMIN (GLUCOPHAGE) 500 MG tablet    N18.2       4. Benign essential hypertension  I10 losartan-hydrochlorothiazide (HYZAAR) 100-25 MG tablet     metoprolol succinate ER (TOPROL XL) 50 MG 24 hr tablet      5. Mixed hyperlipidemia  E78.2 rosuvastatin (CRESTOR) 20 MG tablet      6. Class 2 severe obesity due to excess calories with serious comorbidity and body mass index (BMI) of 37.0 to 37.9 in adult (H)  E66.812     E66.01     Z68.37       7. Coronary artery calcification  I25.10       8. Psoriasis  L40.9 ustekinumab (STELARA) 90 MG/ML     XR Elbow Left G/E 3 Views      9. Immunosuppressed status (H) -- Humira -- For Psoriasis --> Switched to Stelara 1/15/2025 due to insurance preference  D84.9 ustekinumab (STELARA) 90 MG/ML      10. Lateral epicondylitis of left elbow  M77.12       11. Left elbow pain  M25.522 XR Elbow Left G/E 3 Views      12. Sleep difficulties  G47.9          Patient presents for ***    ***    HYPERTENSION - Ongoing. Blood pressure is currently well controlled.  Medication side effects: None. Denies syncope or presyncope.  Continue current medications.   Medication list reviewed/updated. Refills completed as needed.      MIXED HYPERLIPIDEMIA.  Ongoing. LDL is at goal: No. Triglycerides are at goal: Yes.  Hopefully lifestyle modifications will improve cholesterol levels, otherwise will consider additional medication dose adjustments or medication changes.  Medication side effects reported: None.   Continue current medications for now . Medication list reviewed/updated. Refills completed as needed.  Recent Labs   Lab Test 01/15/25  0724 10/07/24  0732   CHOL 143 121   HDL 52 48   LDL 73 56   TRIG 90 85        OBESITY - Ongoing.  (See Encounter  Diagnosis list above for obesity class / severity).  - Encourage continued maintenance / improvement in diet and exercise.   - Consider Nutrition / Dietician appointment.  - Weight loss would improve Hypertension, Cholesterol and Diabete.      Chronic Kidney Disease, Stage 2 (GFR 60-89), chronic, ongoing.  Kidney function had been slowly declining.  Encourage NSAID avoidance.    Recent Labs   Lab Test 01/15/25  0724 10/07/24  0732   CR 0.95 1.01   GFRESTIMATED 86 80        Vaccine counseling completed.  Encourage routine / annual vaccinations.    Type 2 Diabetes Mellitus, with nephropathy.  Blood sugar control has been good with minimal hyperglycemia. Doing well with diet, oral agents, and exercise.  Medication list reviewed/updated. Refills completed as needed.    Complicating factors include but are not limited to: hypertension, hyperlipidemia, chronic kidney disease, and morbid obesity .     Recent Labs   Lab Test 01/15/25  0724 10/07/24  0732 07/02/24  0657   A1C 6.9* 6.8* 7.4*   LDL 73 56 57   HDL 52 48 51   TRIG 90 85 85   ALT 62 68 55   CR 0.95 1.01 0.88   GFRESTIMATED 86 80 >90   POTASSIUM 3.9 3.8 3.7   TSH 2.60 3.47 2.52   WBC 9.0 7.2 7.2   HGB 16.6 15.8 15.4    186 186   ALBUMIN 4.1 4.1 4.1        The longitudinal plan of care for the diagnosis(es)/condition(s) as documented were addressed during this visit. Due to the added complexity in care, I will continue to support Bartolo in the subsequent management and with ongoing continuity of care.      Results for orders placed or performed in visit on 01/15/25   Urine Macroscopic with reflex to Microscopic     Status: Normal   Result Value Ref Range    Color Urine Yellow Colorless, Straw, Light Yellow, Yellow    Appearance Urine Clear Clear    Glucose Urine Negative Negative mg/dL    Bilirubin Urine Negative Negative    Ketones Urine Negative Negative mg/dL    Specific Gravity Urine 1.027 1.000 - 1.030    Blood Urine Negative Negative    pH Urine 5.0 5.0 -  9.0    Protein Albumin Urine Negative Negative mg/dL    Urobilinogen Urine Normal Normal, 2.0 mg/dL    Nitrite Urine Negative Negative    Leukocyte Esterase Urine Negative Negative    Narrative    Microscopic not indicated   TSH with free T4 reflex     Status: Normal   Result Value Ref Range    TSH 2.60 0.30 - 4.20 uIU/mL   Hemoglobin A1c     Status: Abnormal   Result Value Ref Range    Estimated Average Glucose 151 (H) <117 mg/dL    Hemoglobin A1C 6.9 (H) <5.7 %   CBC with platelets     Status: Normal   Result Value Ref Range    WBC Count 9.0 4.0 - 11.0 10e3/uL    RBC Count 5.21 4.40 - 5.90 10e6/uL    Hemoglobin 16.6 13.3 - 17.7 g/dL    Hematocrit 48.4 40.0 - 53.0 %    MCV 93 78 - 100 fL    MCH 31.9 26.5 - 33.0 pg    MCHC 34.3 31.5 - 36.5 g/dL    RDW 12.8 10.0 - 15.0 %    Platelet Count 193 150 - 450 10e3/uL   Albumin Random Urine Quantitative with Creat Ratio     Status: None   Result Value Ref Range    Creatinine Urine mg/dL 234.4 mg/dL    Albumin Urine mg/L 18.7 mg/L    Albumin Urine mg/g Cr 7.98 0.00 - 17.00 mg/g Cr   Lipid Profile     Status: None   Result Value Ref Range    Cholesterol 143 <200 mg/dL    Triglycerides 90 <150 mg/dL    Direct Measure HDL 52 >=40 mg/dL    LDL Cholesterol Calculated 73 <100 mg/dL    Non HDL Cholesterol 91 <130 mg/dL    Patient Fasting > 8hrs? Yes     Narrative    Cholesterol  Desirable: < 200 mg/dL  Borderline High: 200 - 239 mg/dL  High: >= 240 mg/dL    Triglycerides  Normal: < 150 mg/dL  Borderline High: 150 - 199 mg/dL  High: 200-499 mg/dL  Very High: >= 500 mg/dL    Direct Measure HDL  Female: >= 50 mg/dL   Male: >= 40 mg/dL    LDL Cholesterol  Desirable: < 100 mg/dL  Above Desirable: 100 - 129 mg/dL   Borderline High: 130 - 159 mg/dL   High:  160 - 189 mg/dL   Very High: >= 190 mg/dL    Non HDL Cholesterol  Desirable: < 130 mg/dL  Above Desirable: 130 - 159 mg/dL  Borderline High: 160 - 189 mg/dL  High: 190 - 219 mg/dL  Very High: >= 220 mg/dL   Comprehensive metabolic panel  "    Status: Abnormal   Result Value Ref Range    Sodium 141 135 - 145 mmol/L    Potassium 3.9 3.4 - 5.3 mmol/L    Carbon Dioxide (CO2) 25 22 - 29 mmol/L    Anion Gap 12 7 - 15 mmol/L    Urea Nitrogen 16.5 8.0 - 23.0 mg/dL    Creatinine 0.95 0.67 - 1.17 mg/dL    GFR Estimate 86 >60 mL/min/1.73m2    Calcium 9.1 8.8 - 10.4 mg/dL    Chloride 104 98 - 107 mmol/L    Glucose 146 (H) 70 - 99 mg/dL    Alkaline Phosphatase 117 40 - 150 U/L    AST 40 0 - 45 U/L    ALT 62 0 - 70 U/L    Protein Total 6.9 6.4 - 8.3 g/dL    Albumin 4.1 3.5 - 5.2 g/dL    Bilirubin Total 0.5 <=1.2 mg/dL    Patient Fasting > 8hrs? Yes       ***    {[facility] 2021 Documentation (Optional):854000}  {2021 E&M time (Optional):321502}     BMI  Estimated body mass index is 37.84 kg/m  as calculated from the following:    Height as of this encounter: 1.689 m (5' 6.5\").    Weight as of this encounter: 108 kg (238 lb).     Counseling  Appropriate preventive services were addressed with this patient via screening, questionnaire, or discussion as appropriate for fall prevention, nutrition, physical activity, Tobacco-use cessation, social engagement, weight loss and cognition.  Checklist reviewing preventive services available has been given to the patient.  Reviewed patient's diet, addressing concerns and/or questions.   He is at risk for lack of exercise and has been provided with information to increase physical activity for the benefit of his well-being.   Discussed possible causes of fatigue. Patient reported safety concerns were addressed today.  {FOLLOW UP PLANS (Optional) Includes COVID19 Treatment Plan:284436}    Return in about 3 months (around 4/15/2025) for - Labs every 91+ days, with DM Follow-up, Same Day or 1-2 days later with Dr. Samuels.    Review of Systems   Constitutional:  Negative for chills and fever.   HENT:  Negative for congestion and hearing loss.    Eyes:  Negative for visual disturbance.   Respiratory:  Negative for cough, shortness of " breath and wheezing.    Cardiovascular:  Negative for chest pain.   Gastrointestinal:  Negative for abdominal pain, diarrhea, nausea and vomiting.   Endocrine: Negative for cold intolerance and heat intolerance.   Genitourinary:  Negative for dysuria and hematuria.   Musculoskeletal:  Positive for arthralgias (intermittent, left elbow). Negative for myalgias.   Skin:  Negative for rash and wound.   Allergic/Immunologic: Positive for immunocompromised state.   Neurological:  Negative for dizziness and light-headedness.   Hematological:  Does not bruise/bleed easily.   Psychiatric/Behavioral:  Positive for sleep disturbance. Negative for agitation and confusion.        Preventive Care Visit  [facility] \[facility] [place] [facility]\""  Alexander Samuels MD, Internal Medicine  Javier 15, 2025  {Provider  Link to SmartSet :830455}      Paco Mcfarland is a 69 year old, presenting for the following:  medicare wellness        1/15/2025     8:11 AM   Additional Questions   Roomed by Tiarra Ochoa LPN   {ROOMER positive Fall Risk- Gait Speed Test is required click here to document the Gait Speed Test and then refresh the note to pull in results  :631363}  {ROOMER if patient is in their first year of Medicare a vision screen is required click here to document the Vison screen and then refresh the note to pull in results  :465763}      History of Present Illness       Diabetes:   He presents for follow up of diabetes.  He is checking home blood glucose one time daily.   He checks blood glucose before meals.  Blood glucose is never over 200 and never under 70.  When his blood glucose is low, the patient is asymptomatic for confusion, blurred vision, lethargy and reports not feeling dizzy, shaky, or weak.   He has no concerns regarding his diabetes at this time.   He is not experiencing numbness or burning in feet, excessive thirst, blurry vision, weight changes or redness, sores or blisters on feet. The patient has had a diabetic eye exam in  the last 12 months. Eye exam performed on [facility]. Location of last eye exam [place].        Hyperlipidemia:  He presents for follow up of hyperlipidemia.   He is taking medication to lower cholesterol. He is not having myalgia or other side effects to statin medications.    Hypertension: He presents for follow up of hypertension.  He does not check blood pressure  regularly outside of the clinic. Outpatient blood pressures have not been over 140/90. He follows a low salt diet.     He eats 0-1 servings of fruits and vegetables daily.He consumes 0 sweetened beverage(s) daily.He exercises with enough effort to increase his heart rate 9 or less minutes per day.  He exercises with enough effort to increase his heart rate 3 or less days per week.   He is taking medications regularly.      {MA/LPN/RN Pre-Provider Visit Orders- hCG/UA/Strep (Optional):028343}  {SUPERLIST (Optional):787822}  {additonal problems for provider to add (Optional):453616}  Health Care Directive  Patient has a Health Care Directive on file  Discussed advance care planning with patient.      1/15/2025   General Health   How would you rate your overall physical health? (!) FAIR   Feel stress (tense, anxious, or unable to sleep) Only a little   (!) STRESS CONCERN      1/15/2025   Nutrition   Diet: Regular (no restrictions)         1/15/2025   Exercise   Days per week of moderate/strenous exercise 1 day   (!) EXERCISE CONCERN      1/15/2025   Social Factors   Frequency of gathering with friends or relatives Twice a week   Worry food won't last until get money to buy more No   Food not last or not have enough money for food? No   Do you have housing? (Housing is defined as stable permanent housing and does not include staying ouside in a car, in a tent, in an abandoned building, in an overnight shelter, or couch-surfing.) Yes   Are you worried about losing your housing? No   Lack of transportation? No   Unable to get utilities (heat,electricity)?  No         1/15/2025   Fall Risk   Fallen 2 or more times in the past year? Yes   Trouble with walking or balance? No     {Positive Fall Risk- Gait Speed Test is required and was not documented before note was started.  If results do not appear, ask staff to complete.  Once completed, refresh note to pull in results Click here to link Gait Speed Test  :250215}      1/15/2025   Activities of Daily Living- Home Safety   Needs help with the following daily activites None of the above   Safety concerns in the home Throw rugs in the hallway         1/15/2025   Dental   Dentist two times every year? Yes         1/15/2025   Hearing Screening   Hearing concerns? None of the above         1/15/2025   Driving Risk Screening   Patient/family members have concerns about driving No         1/15/2025   General Alertness/Fatigue Screening   Have you been more tired than usual lately? (!) YES         1/15/2025   Urinary Incontinence Screening   Bothered by leaking urine in past 6 months No         1/15/2025   TB Screening   Were you born outside of the [place]? Yes         Today's PHQ-2 Score:       1/15/2025     7:34 AM   PHQ-2 (  [employer])   Q1: Little interest or pleasure in doing things 0   Q2: Feeling down, depressed or hopeless 0   PHQ-2 Score 0    Q1: Little interest or pleasure in doing things Not at all   Q2: Feeling down, depressed or hopeless Not at all   PHQ-2 Score 0       Patient-reported           1/15/2025   Substance Use   Alcohol more than 3/day or more than 7/wk No   Do you have a current opioid prescription? No   How severe/bad is pain from 1 to 10? 5/10   Do you use any other substances recreationally? (!) CANNABIS PRODUCTS     Social History     Tobacco Use    Smoking status: Former     Current packs/day: 0.00     Types: Cigarettes     Quit date: 1/15/2003     Years since quittin.0    Smokeless tobacco: Never   Vaping Use    Vaping status: Never Used   Substance Use Topics    Alcohol use: Yes      Alcohol/week: 2.0 standard drinks of alcohol     Comment: Occasional    Drug use: Yes     Types: Marijuana     Comment: Drug use: NoMarijuana occasionally     {Provider  If there are gaps in the social history shown above, please follow the link to update and then refresh the note Link to Social and Substance History :120512}  ASCVD Risk   The 10-year ASCVD risk score (Lalo FOLEY, et al., 2019) is: 27.4%    Values used to calculate the score:      Age: 69 years      Sex: Male      Is Non- : No      Diabetic: Yes      Tobacco smoker: No      Systolic Blood Pressure: 118 mmHg      Is BP treated: Yes      HDL Cholesterol: 52 mg/dL      Total Cholesterol: 143 mg/dL    {Link to Fracture Risk Assessment Tool (Optional):792799}    {Provider  REQUIRED FOR AWV Use the storyboard to review patient history, after sections have been marked as reviewed, refresh note to capture documentation:100945}  {Provider   REQUIRED AWV use this link to review and update sexual activity history  after section has been marked as reviewed, refresh note to capture documentation:293891}  Reviewed and updated as needed this visit by Provider   Tobacco  Allergies  Meds  Problems  Med Hx  Surg Hx  Fam Hx     Sexual Activity          {HISTORY OPTIONS (Optional):695716}  Current providers sharing in care for this patient include:  Patient Care Team:  Alexander Samuels MD as PCP - General (Internal Medicine)  Tony Sow MD as Assigned PCP  Carmen Olmstead RN as Diabetes Educator (Diabetes Education)  Hunter Brantley MD as Assigned Musculoskeletal Provider    The following health maintenance items are reviewed in Epic and correct as of today:  Health Maintenance   Topic Date Due    ASTHMA ACTION PLAN  Never done    COVID-19 Vaccine (8 - 2024-25 season) 12/02/2024    DIABETIC FOOT EXAM  01/04/2025    EYE EXAM  02/01/2025    A1C  04/15/2025    ASTHMA CONTROL TEST  04/22/2025    MEDICARE ANNUAL WELLNESS  "VISIT  01/15/2026    BMP  01/15/2026    LIPID  01/15/2026    MICROALBUMIN  01/15/2026    FALL RISK ASSESSMENT  01/15/2026    DTAP/TDAP/TD IMMUNIZATION (2 - Td or Tdap) 05/04/2027    ADVANCE CARE PLANNING  01/15/2030    COLORECTAL CANCER SCREENING  07/30/2030    HEPATITIS C SCREENING  Completed    PHQ-2 (once per calendar year)  Completed    INFLUENZA VACCINE  Completed    Pneumococcal Vaccine: 50+ Years  Completed    URINALYSIS  Completed    ZOSTER IMMUNIZATION  Completed    RSV VACCINE  Completed    AORTIC ANEURYSM SCREENING (SYSTEM ASSIGNED)  Completed    HPV IMMUNIZATION  Aged Out    MENINGITIS IMMUNIZATION  Aged Out    RSV MONOCLONAL ANTIBODY  Aged Out       {ROS Picklists (Optional):834598}     Objective    Exam  /82 (BP Location: Right arm)   Pulse 85   Temp (!) 96.4  F (35.8  C) (Tympanic)   Resp 15   Ht 1.689 m (5' 6.5\")   Wt 108 kg (238 lb)   SpO2 92%   BMI 37.84 kg/m     Estimated body mass index is 37.84 kg/m  as calculated from the following:    Height as of this encounter: 1.689 m (5' 6.5\").    Weight as of this encounter: 108 kg (238 lb).    Wt Readings from Last 5 Encounters:   01/15/25 108 kg (238 lb)   10/25/24 110.2 kg (243 lb)   10/22/24 110.3 kg (243 lb 3.2 oz)   10/07/24 110.6 kg (243 lb 12.8 oz)   07/02/24 114.8 kg (253 lb)        Physical Exam  Constitutional:       General: He is not in acute distress.     Appearance: Normal appearance. He is well-developed. He is obese. He is not ill-appearing.   Eyes:      General: No scleral icterus.     Conjunctiva/sclera: Conjunctivae normal.   Neck:      Vascular: No carotid bruit.   Cardiovascular:      Rate and Rhythm: Normal rate and regular rhythm.      Pulses: Normal pulses.   Pulmonary:      Effort: Pulmonary effort is normal. No respiratory distress.      Breath sounds: No wheezing.   Abdominal:      Palpations: Abdomen is soft.      Tenderness: There is no abdominal tenderness.   Musculoskeletal:         General: Tenderness (mild " lateral epicondylitis of left elbow) present. No deformity.      Right lower leg: Edema (trace) present.      Left lower leg: Edema (trace) present.   Skin:     General: Skin is warm and dry.      Findings: No rash.   Neurological:      Mental Status: He is alert. Mental status is at baseline.      Cranial Nerves: No cranial nerve deficit.   Psychiatric:         Mood and Affect: Mood normal.         Behavior: Behavior normal.       {Exam Choices (Optional):803007}        1/15/2025   Mini Cog   Clock Draw Score 2 Normal   3 Item Recall 2 objects recalled   Mini Cog Total Score 4     {A Mini-Cog total score of 0-2 suggests the possibility of dementia, score of 3-5 suggests no dementia:616454}         Signed Electronically by: Alexander Samuels MD  {Email feedback regarding this note to [email or web address]   :434169}

## 2025-01-15 NOTE — NURSING NOTE
Patient presents for medicare wellness and follow up on diabetes.  Tiarra Ochoa LPN.........................1/15/2025  8:14 AM

## 2025-01-25 DIAGNOSIS — E11.9 TYPE 2 DIABETES MELLITUS WITHOUT COMPLICATION, WITHOUT LONG-TERM CURRENT USE OF INSULIN (H): ICD-10-CM

## 2025-01-27 RX ORDER — BLOOD SUGAR DIAGNOSTIC
STRIP MISCELLANEOUS
Qty: 100 STRIP | Refills: 0 | Status: SHIPPED | OUTPATIENT
Start: 2025-01-27

## 2025-01-27 NOTE — TELEPHONE ENCOUNTER
[id number] in [employer] [facility] [place] sent Rx request for the following:      Requested Prescriptions   Pending Prescriptions Disp Refills    ACCU-CHEK GUIDE TEST test strip [Pharmacy Med Name: ACCU-CHEK GUIDE TEST STRIP] 100 strip 6     Sig: USE TO TEST BLOOD SUGAR 1 TIMES DAILY. TO ACCOMPANY: BLOOD GLUCOSE MONITOR BRANDS: PER INSURANCE.   Last Prescription Date:   9/28/23  Last Fill Qty/Refills:         100, R-6    Last Office Visit:              1/15/25 (Px)   Future Office visit:           4/24/25    Per LOV note:  Return in about 3 months (around 4/15/2025) for - Labs every 91+ days, with DM Follow-up, Same Day or 1-2 days later with Dr. Samuels.     Prescription refilled per RN Medication Refill Policy.................... Heather Holly RN ....................  1/27/2025   3:17 PM

## 2025-01-28 ENCOUNTER — TELEPHONE (OUTPATIENT)
Dept: INTERNAL MEDICINE | Facility: OTHER | Age: 70
End: 2025-01-28
Payer: COMMERCIAL

## 2025-01-28 DIAGNOSIS — D84.9 IMMUNOSUPPRESSED STATUS: Primary | ICD-10-CM

## 2025-01-28 NOTE — TELEPHONE ENCOUNTER
Patient calling to let provider know his Stelara medication has been denied by his insurance. Please advise.  Carleen Hooper on 1/28/2025 at 12:29 PM

## 2025-01-28 NOTE — TELEPHONE ENCOUNTER
Called and spoke with patient and advised him to reach out to insurance to find out what medications may be covered. Patient reports speaking with Medicare and they wont cover the medication until he sees a rheumatologist. Referral pending.  Soha Shabazz LPN

## 2025-02-01 ENCOUNTER — TRANSFERRED RECORDS (OUTPATIENT)
Dept: HEALTH INFORMATION MANAGEMENT | Facility: OTHER | Age: 70
End: 2025-02-01
Payer: COMMERCIAL

## 2025-02-01 LAB — RETINOPATHY: NEGATIVE

## 2025-02-06 ENCOUNTER — TELEPHONE (OUTPATIENT)
Dept: INTERNAL MEDICINE | Facility: OTHER | Age: 70
End: 2025-02-06
Payer: COMMERCIAL

## 2025-02-06 NOTE — TELEPHONE ENCOUNTER
We received a denial for order for [facility]. I faxed the denial to [address] with the information.  Terri Spencer on 2/6/2025 at 2:01 PM

## 2025-02-11 NOTE — PROGRESS NOTES
Referral from Dr Samuels: Immunosuppressed status     History of right wrist fracture 10/18/24 with ORIF 10/25/24.    History of psoriasis--was on Humira until his insurance requested he switch to Stelara which was sent in 1/15/25 but Medicare won't cover it until he sees Rheumatology. Medicare denial for Stelara dated 1/28/25 was received on 2/6/24.    1/15/24 left elbow x-ray. Impression: No acute osseous abnormality.

## 2025-02-14 ASSESSMENT — RHEUMATOLOGY NEW PATIENT QUESTIONNAIRE
MEMORY LOSS: Y
DIFFICULTY BREATHING LYING DOWN: N
RASH: N
FEVER: N
ABNORMAL URINE: N
BLOOD IN STOOLS: N
CHEST PAIN: N
FAINTING: N
MORNING STIFFNESS: N
COUGH: N
UNUSUALLY RAPID OR SLOWED HEART RATE: N
DIFFICULTY STAYING ASLEEP: N
UNEXPLAINED WEIGHT CHANGE: N
EXCESSIVE HAIR LOSS (MORE THAN YOUR NORM): N
HEADACHES: N
EYE REDNESS: N
NIGHT SWEATS: N
PAIN OR BURNING ON URINATION: N
ANXIETY: N
DIFFICULTY FALLING ASLEEP: Y
EASY BRUISING: N
STOMACH PAIN: N
SWOLLEN LEGS OR FEET: N
JAUNDICE: N
BEHAVIORAL CHANGES: N
HEARTBURN OR REFLUX: Y
INCREASED SUSCEPTIBILITY TO INFECTION: N
EYE DRYNESS: N
SKIN TIGHTNESS: N
UNUSUAL FATIGUE: N
VOMITING OF BLOOD OR COFFEE GROUND CONSISTENCY MATERIAL: N
DRYNESS OF MOUTH: Y
NUMBNESS OR TINGLING IN HANDS OR FEET: N
BLACK STOOLS: N
SHORTNESS OF BREATH: Y
JOINT PAIN: Y
MUSCLE WEAKNESS: N
SUN SENSITIVE (SUN ALLERGY): N
DIFFICULTY SWALLOWING: N
PERSISTENT DIARRHEA: N
SKIN REDNESS: N
UNUSUAL BLEEDING: N
SORES IN MOUTH OR NOSE: N
UNEXPLAINED HEARING LOSS: N
EYE PAIN: N
RASH OR ULCERS: N
NAUSEA: N
JOINT SWELLING: N

## 2025-02-18 ENCOUNTER — OFFICE VISIT (OUTPATIENT)
Facility: OTHER | Age: 70
End: 2025-02-18
Attending: INTERNAL MEDICINE
Payer: MEDICARE

## 2025-02-18 VITALS
OXYGEN SATURATION: 92 % | TEMPERATURE: 97.3 F | HEIGHT: 67 IN | HEART RATE: 69 BPM | WEIGHT: 246.7 LBS | BODY MASS INDEX: 38.72 KG/M2

## 2025-02-18 DIAGNOSIS — Z79.899 OTHER LONG TERM (CURRENT) DRUG THERAPY: ICD-10-CM

## 2025-02-18 DIAGNOSIS — M15.8 OTHER OSTEOARTHRITIS INVOLVING MULTIPLE JOINTS: ICD-10-CM

## 2025-02-18 DIAGNOSIS — L40.9 PSORIASIS: Primary | ICD-10-CM

## 2025-02-18 DIAGNOSIS — Z11.59 ENCOUNTER FOR SCREENING FOR OTHER VIRAL DISEASES: ICD-10-CM

## 2025-02-18 DIAGNOSIS — Z71.89 OTHER SPECIFIED COUNSELING: Chronic | ICD-10-CM

## 2025-02-18 DIAGNOSIS — D84.9 IMMUNOCOMPROMISED PATIENT: ICD-10-CM

## 2025-02-18 DIAGNOSIS — M77.12 LATERAL EPICONDYLITIS OF LEFT ELBOW: ICD-10-CM

## 2025-02-18 DIAGNOSIS — E66.9 OBESITY (BMI 30-39.9): ICD-10-CM

## 2025-02-18 DIAGNOSIS — Z28.39 UNDERIMMUNIZATION STATUS: ICD-10-CM

## 2025-02-18 LAB
BASOPHILS # BLD AUTO: 0 10E3/UL (ref 0–0.2)
BASOPHILS NFR BLD AUTO: 0 %
EOSINOPHIL # BLD AUTO: 0.1 10E3/UL (ref 0–0.7)
EOSINOPHIL NFR BLD AUTO: 1 %
ERYTHROCYTE [DISTWIDTH] IN BLOOD BY AUTOMATED COUNT: 12.7 % (ref 10–15)
HBV CORE IGM SERPL QL IA: NONREACTIVE
HBV SURFACE AB SERPL IA-ACNC: <3.5 M[IU]/ML
HBV SURFACE AB SERPL IA-ACNC: NONREACTIVE M[IU]/ML
HBV SURFACE AG SERPL QL IA: NONREACTIVE
HCT VFR BLD AUTO: 48.7 % (ref 40–53)
HCV AB SERPL QL IA: NONREACTIVE
HGB BLD-MCNC: 16.1 G/DL (ref 13.3–17.7)
IMM GRANULOCYTES # BLD: 0 10E3/UL
IMM GRANULOCYTES NFR BLD: 0 %
LYMPHOCYTES # BLD AUTO: 2 10E3/UL (ref 0.8–5.3)
LYMPHOCYTES NFR BLD AUTO: 21 %
MCH RBC QN AUTO: 31.1 PG (ref 26.5–33)
MCHC RBC AUTO-ENTMCNC: 33.1 G/DL (ref 31.5–36.5)
MCV RBC AUTO: 94 FL (ref 78–100)
MONOCYTES # BLD AUTO: 1.3 10E3/UL (ref 0–1.3)
MONOCYTES NFR BLD AUTO: 14 %
NEUTROPHILS # BLD AUTO: 6 10E3/UL (ref 1.6–8.3)
NEUTROPHILS NFR BLD AUTO: 63 %
NRBC # BLD AUTO: 0 10E3/UL
NRBC BLD AUTO-RTO: 0 /100
PLATELET # BLD AUTO: 204 10E3/UL (ref 150–450)
RBC # BLD AUTO: 5.17 10E6/UL (ref 4.4–5.9)
WBC # BLD AUTO: 9.5 10E3/UL (ref 4–11)

## 2025-02-18 PROCEDURE — 86705 HEP B CORE ANTIBODY IGM: CPT | Mod: ZL | Performed by: INTERNAL MEDICINE

## 2025-02-18 PROCEDURE — 87340 HEPATITIS B SURFACE AG IA: CPT | Mod: ZL | Performed by: INTERNAL MEDICINE

## 2025-02-18 PROCEDURE — 85025 COMPLETE CBC W/AUTO DIFF WBC: CPT | Mod: ZL | Performed by: INTERNAL MEDICINE

## 2025-02-18 PROCEDURE — 86706 HEP B SURFACE ANTIBODY: CPT | Mod: ZL | Performed by: INTERNAL MEDICINE

## 2025-02-18 PROCEDURE — 86803 HEPATITIS C AB TEST: CPT | Mod: ZL | Performed by: INTERNAL MEDICINE

## 2025-02-18 PROCEDURE — 36415 COLL VENOUS BLD VENIPUNCTURE: CPT | Mod: ZL | Performed by: INTERNAL MEDICINE

## 2025-02-18 PROCEDURE — 86481 TB AG RESPONSE T-CELL SUSP: CPT | Mod: ZL | Performed by: INTERNAL MEDICINE

## 2025-02-18 PROCEDURE — G0463 HOSPITAL OUTPT CLINIC VISIT: HCPCS | Performed by: INTERNAL MEDICINE

## 2025-02-18 ASSESSMENT — PAIN SCALES - GENERAL: PAINLEVEL_OUTOF10: NO PAIN (0)

## 2025-02-18 NOTE — PROGRESS NOTES
"RHEUMATOLOGY CONSULTATION    REQUESTING PROVIDER: Abril    REASON FOR VISIT:     Cecilio Nolan is a 70 year old male who presents for evaluation of psoriasis, possible psoriatic arthritis.    PRELIMINARY BACKGROUND:  Diagnosis: Plaque psoriasis-onset \"many years ago\" (10-15 years ago)  A) Manifested by:    --Symptoms / physical findings:   **Psoriasis-over elbows, forearms, knees, lower legs, behind the ears  **Nail pitting numerous fingernails  **?  Enthesitis-lateral epicondylitis  **   --lab findings:    Positive/abnormal:   **None  **  Negative/normal:   **CRP  **    --rad findings / studies:   **X-ray left elbow 1/15/2025-normal/negative  **X-ray right wrist 12/6/2024-internal fixation distal radial fracture with healing, normal-appearing carpal bones  **X-ray left knee 11/16/2021-normal  **  b) Treatment history:   **Topical Kenalog as needed for psoriasis  **Humira-?  2017 through present (losing coverage through PAP)  **Stelara-ordered but never started (insurance company requires he see dermatology)  **  c) Currently taking:   **Humira 40 mg every 14 days (has 1 pen left)  **Topical Kenalog as needed for psoriasis  **  **    Other notable history/co-morbidities:  **Hypertension, hyperlipidemia, obesity, type 2 diabetes mellitus    [x] Family history of autoimmune disease (list): Mother-psoriatic arthritis  [] Alcohol (how much?):   None  [] Smoking / tobacco / vaping (how much?):   None  [] Drug Allergies (Notable - list): None    HISTORY OF PRESENT ILLNESS:  Initial Evaluation (2/18/2025): Cecilio Nolan is a 70-year-old male referred for evaluation of chronic plaque psoriasis, possible psoriatic arthritis.  He has never seen a rheumatologist in the past, and he does not believe he has seen a dermatologist either.  The psoriasis came on \"many years ago\", probably 10 or 15 years ago.  Psoriasis affects the elbows, forearms, knees, legs, behind the ears.  He also has nail pitting of several fingernails. "  He did have topical Kenalog in the past, and methotrexate was discussed in the past as a treatment but he declined this due to concern for liver issues (liver biopsy 1995).  He started the Humira (?  Around 2017) and responded very well to it, is currently taking the Humira 40 mg injection every 14 days.  Unfortunately he was notified by Medicare that this will no longer be covered, and he needs to choose an alternative option.  Stelara was ordered, but the insurance company declined until he has seen a dermatologist.    He does have some joint pain, including the right wrist after tripping/falling and sustaining a fracture October 2024.  This is healed well, but does get some occasional pains.  Also gets occasional knee pain, worse with increased use.  Has not taken any treatments for this.  Denies any erythema/swelling/warmth of the joints, and has no morning stiffness.  He does have some trigger finger of the right index finger, and also has history of left elbow epicondylitis treated with forearm brace and that resolved.  He otherwise denies any chronic tendinitis issues including Achilles tendinitis, plantar fasciitis.  He denies any inflammatory eye disease history or symptoms.  Denies any chronic inflammatory back pain history or symptoms.  Denies any inflammatory bowel disease history or symptoms.  He does get occasional blood in the stools with his hemorrhoids, but denies any abdominal pain, frequent diarrhea, tenesmus etc.  Denies any history of dactylitis.      He does have a family history of psoriatic arthritis in his mother.    No questionnaires on file.       14 point review of systems unremarkable other than as noted per HPI     -------------------------------------------------------------------------------------------------------------------------  PAST HISTORY:  Past Medical History:   Diagnosis Date    Adenomatous colon polyp 2020    F/U due in 2025    Coronary artery calcification     Psoriasis      No Comments Provided    Transaminitis     negative liver biopsy     Past Surgical History:   Procedure Laterality Date    COLONOSCOPY  08/22/2010    WNL Q10yrs.    COLONOSCOPY N/A 07/30/2020    Due 7/2025 Procedure: COLONOSCOPY, WITH POLYPECTOMY AND BIOPSY;  Surgeon: Eliazar Oakes MD;  Location: [facility] OR    HERNIA REPAIR Right 2009    LIVER BIOPSY  1995    OPEN REDUCTION INTERNAL FIXATION WRIST Right 10/25/2024    Procedure: Open reduction internal fixation wrist;  Surgeon: Hunter Brantley MD;  Location:  OR     Current Outpatient Medications   Medication Sig Dispense Refill    albuterol (PROAIR HFA/PROVENTIL HFA/VENTOLIN HFA) 108 (90 Base) MCG/ACT inhaler Inhale 1-2 puffs into the lungs every 4 hours as needed for shortness of breath or wheezing 18 g 11    aspirin 81 MG tablet Take 81 mg by mouth daily with food      blood glucose (ACCU-CHEK GUIDE TEST) test strip USE TO TEST BLOOD SUGAR 1 TIMES DAILY. TO ACCOMPANY: BLOOD GLUCOSE MONITOR BRANDS: PER INSURANCE. 100 strip 0    blood glucose monitoring (NO BRAND SPECIFIED) meter device kit Use to test blood sugar 1 times daily. Preferred blood glucose meter OR supplies to accompany: Blood Glucose Monitor Brands: per insurance. 1 kit 0    HYDROcodone-acetaminophen (NORCO) 5-325 MG tablet Take 1-2 tablets by mouth every 4 hours as needed for moderate to severe pain. 20 tablet 0    losartan-hydrochlorothiazide (HYZAAR) 100-25 MG tablet Take 1 tablet by mouth daily. 90 tablet 4    metFORMIN (GLUCOPHAGE) 500 MG tablet Take 1 tablet (500 mg) by mouth daily (with breakfast). 90 tablet 1    metoprolol succinate ER (TOPROL XL) 50 MG 24 hr tablet Take 1 tablet (50 mg) by mouth daily. 90 tablet 4    Multiple Vitamin (MULTI-VITAMINS) TABS Take 1 tablet by mouth daily      rosuvastatin (CRESTOR) 20 MG tablet Take 1 tablet (20 mg) by mouth daily. 90 tablet 4    thin (NO BRAND SPECIFIED) lancets Use with lanceting device. To accompany: Blood Glucose Monitor Brands: per  insurance. 100 each 6    ustekinumab (STELARA) 90 MG/ML Inject 90 mg under the skin at week 0, 4, then every 12 weeks thereafter. Hold for signs of infection, and seek medical attention. 3 mL 1    ustekinumab (STELARA) 90 MG/ML Inject 1 mL (90 mg) subcutaneously every 28 days for 56 days, THEN 1 mL (90 mg) every 3 months. - Start when due / out of Humera injection, then 2nd dose 28 days later, then next dose every 12 weeks thereafter. (Patient not taking: Reported on 2025) 3 mL 1     No Known Allergies    SOCIAL HISTORY:  Social History     Socioeconomic History    Marital status: Single     Spouse name: Not on file    Number of children: Not on file    Years of education: Not on file    Highest education level: Not on file   Occupational History    Not on file   Tobacco Use    Smoking status: Former     Current packs/day: 0.00     Types: Cigarettes     Quit date: 1/15/2003     Years since quittin.1    Smokeless tobacco: Never   Vaping Use    Vaping status: Never Used   Substance and Sexual Activity    Alcohol use: Yes     Alcohol/week: 2.0 standard drinks of alcohol     Comment: Occasional    Drug use: Yes     Types: Marijuana     Comment: Drug use: NoMarijuana occasionally    Sexual activity: Not Currently   Other Topics Concern    Parent/sibling w/ CABG, MI or angioplasty before 65F 55M? Not Asked   Social History Narrative    He is single, worked at [employer], retired 2021.      He lives in [place]    Likes walking, reading.      Social Drivers of Health     Financial Resource Strain: Low Risk  (1/15/2025)    Financial Resource Strain     Within the past 12 months, have you or your family members you live with been unable to get utilities (heat, electricity) when it was really needed?: No   Food Insecurity: Low Risk  (1/15/2025)    Food Insecurity     Within the past 12 months, did you worry that your food would run out before you got money to buy more?: No     Within the past 12 months, did the  food you bought just not last and you didn t have money to get more?: No   Transportation Needs: Low Risk  (1/15/2025)    Transportation Needs     Within the past 12 months, has lack of transportation kept you from medical appointments, getting your medicines, non-medical meetings or appointments, work, or from getting things that you need?: No   Physical Activity: Unknown (1/15/2025)    Exercise Vital Sign     Days of Exercise per Week: 1 day     Minutes of Exercise per Session: Not on file   Stress: No Stress Concern Present (1/15/2025)    [employer] [place] [employer] - Occupational Stress Questionnaire     Feeling of Stress : Only a little   Social Connections: Unknown (1/15/2025)    Social Connection and Isolation Panel [NHANES]     Frequency of Communication with Friends and Family: Not on file     Frequency of Social Gatherings with Friends and Family: Twice a week     Attends Druze Services: Not on file     Active Member of Clubs or Organizations: Not on file     Attends Club or Organization Meetings: Not on file     Marital Status: Not on file   Interpersonal Safety: Low Risk  (2/18/2025)    Interpersonal Safety     Do you feel physically and emotionally safe where you currently live?: Yes     Within the past 12 months, have you been hit, slapped, kicked or otherwise physically hurt by someone?: No     Within the past 12 months, have you been humiliated or emotionally abused in other ways by your partner or ex-partner?: No   Housing Stability: Low Risk  (1/15/2025)    Housing Stability     Do you have housing? : Yes     Are you worried about losing your housing?: No       FAMILY HISTORY:  Family History   Problem Relation Age of Onset    Arthritis Mother         Psoriatic arthritis    Diabetes Father     Other - See Comments Father         adenomatous colonic polyps    Hyperlipidemia Father     Heart Disease Father         CHF    Family History Negative Sister         Good Health    Heart  "Disease Brother         CABG    Heart Disease Brother         CABG    Heart Disease Brother         MI  @60    Other - See Comments Brother         Suicide    Heart Disease Brother         CABG    Coronary Artery Disease Brother     Asthma Brother          PHYSICAL EXAM:  Pulse 69   Temp 97.3  F (36.3  C) (Temporal)   Ht 1.689 m (5' 6.5\")   Wt 111.9 kg (246 lb 11.2 oz)   SpO2 92%   BMI 39.22 kg/m    Body mass index is 39.22 kg/m .    Physical Exam  Vitals reviewed.   Constitutional:       General: He is not in acute distress.     Appearance: Normal appearance. He is obese.   HENT:      Head: Atraumatic.      Right Ear: External ear normal.      Left Ear: External ear normal.      Nose: Nose normal.      Mouth/Throat:      Pharynx: Oropharynx is clear.   Eyes:      Conjunctiva/sclera: Conjunctivae normal.   Cardiovascular:      Rate and Rhythm: Regular rhythm.   Pulmonary:      Effort: Pulmonary effort is normal. No respiratory distress.   Abdominal:      General: There is no distension.   Musculoskeletal:      Comments: Nail pitting numerous fingernails   Lymphadenopathy:      Cervical: No cervical adenopathy.   Skin:     General: Skin is warm.      Findings: No rash.   Neurological:      General: No focal deficit present.      Mental Status: He is alert and oriented to person, place, and time.   Psychiatric:         Mood and Affect: Mood normal.          Rheumatology Musculoskeletal Exam  Normal unless noted [x]: with no deformity, swelling, subluxation, muscle wasting, erythema/warmth/swelling, synovitis, TTP, effusions, nodules/tophi. FROM intact. Strength intact. No locking/crepitus. (S=swollen, T=tender)   Shoulder: Elbow:   Wrist:     Right:  **S: []  T: []  ** [] Other:   Left:  **S: []  T: []  ** [] Other:    Right:  **S: []  T: []  ** [] Other:   Left:  **S: []  T: []  ** [] Other:    Right:  **S: []  T: []  ** [] Other:   Left:  **S: []  T: []  ** [] Other:      Hand MCP:   Hand PIP / 1st IP:   " Hand DIP:   Right:  **1st:  S: []  T: []  **2nd:  S: []  T: []  **3rd:  S: []  T: []  **4th:  S: []  T: []  **5th:  S: []  T: []  ** [] Other:   Left:  **1st:  S: []  T: []  **2nd:  S: []  T: []  **3rd:  S: []  T: []  **4th:  S: []  T: []  **5th:  S: []  T: []  ** [] Other:    Right:  **1st:  S: []  T: []  **2nd:  S: []  T: []  **3rd:  S: []  T: []  **4th:  S: []  T: []  **5th:  S: []  T: []  **Bouch nodes: []  ** [] Other:   Left:  **1st:  S: []  T: []  **2nd:  S: []  T: []  **3rd:  S: []  T: []  **4th:  S: []  T: []  **5th:  S: []  T: []  **Bouch nodes: []  ** [] Other:     Right:  S: []  T: []  Location(s):   **Heb nodes: []  ** [] Other:   Left:  S: []  T: []  Location(s):   **Heb nodes: []  ** [] Other:      Knee:  Ankle: Foot:   Right:  **S: []  T: []  ** [] Other:   Left:  **S: []  T: []  ** [] Other:    Right:  **S: []  T: []  ** [] Other:   Left:  **S: []  T: []  ** [] Other:    Right:  **S: []  T: []  **(+) MT squeeze:  []  ** [] Other:   Left:  **S: []  T: []  **(+) MT squeeze:  []  ** [] Other:      Hip / SIJ: Back: Other:   Right:  **(+) Lateral T: []   **(+) FADIR: []   **(+) AMADOR: []   ** [] Other:   Left:  **(+) Lateral T: []  **(+) FADIR: []  **(+) AMADOR: []   ** [] Other:    ** [] Flex / Ext / ROM:   ** [] Other:    [] Fibromyalgia tenderpoints:   _________    [] Nailfold capillaroscopy: ________     No images are attached to the encounter.    LABS / STUDIES: Reviewed - notable as above  -----------------------------------------------------------------  DISEASE ACTIVITY SCORES:  Not applicable  -------------------------------------------------------------------------------------------------------------------------------------------------------------------------------------------------------------------------------------------  ASSESSMENT / PLAN:     ASSESSMENT:     Psoriasis (Primary)  Cecilio Nolan is a 70 year old male who presents for evaluation of plaque psoriasis, for potential psoriatic  arthritis, and for potential treatment options.  He has chronic plaque psoriasis for the last 10-15 years, affecting the elbows/forearms, knees/legs, behind the ears, and also has nail pitting.  He has family history of psoriatic arthritis in his mother.    He was initially treated with topical steroids (e.g. Kenalog), and subsequently was started on Humira (?  Since 2017) has done very well on this, however this will no longer be covered by PAP and he cannot afford the $2000 annual co-pay, so is looking for an alternative option.  Stelara was ordered by his PCP, but the insurance company declined this until he is seen a dermatologist.    At this point, he has no typical extracutaneous features to suggest psoriatic arthritis, aside from the mild epicondylitis which is now resolved.  Specifically denies any history of inflammatory eye disease, chronic Achilles tendinitis/plantar fasciitis or other enthesitis, inflammatory back issues to suggest spondylarthritis, inflammatory bowel disease history, dactylitis, or signs/symptoms of inflammatory arthritis including red/hot/swollen joints or prolonged morning stiffness.  He does have some mild osteoarthritis.    Will plan to send for hepatitis/tuberculosis testing and baseline DMARD labs.  I think Stelara is reasonable to treat his psoriasis, however will defer further treatment to dermatology who are best suited to manage the limited psoriasis moving forward.  Will refer to dermatology professionals locally.  Will order the Stelara to get the ball rolling on that.  He does have 1 Humira pen left, which currently has been controlling his psoriasis very well, with no active psoriasis on exam today.    Return precautions discussed for signs/symptoms concerning for emerging psoriatic arthritis or other autoimmune disease for which he should follow-up with rheumatology.  The nail pitting can sometimes predict ray distributed inflammatory arthritis affecting the digit, and he  does have family history of psoriatic arthritis in his mother.    PLAN:   Treatment regimen:   --Medications:    **Stelara 90 mg injection at weeks 0, 4, then every 12 weeks thereafter (for weight greater than 100 kg)  **Topical Kenalog cream as needed for psoriasis  **Topical Voltaren gel up to 4 times daily as needed for pain/swelling, Tylenol up to 1000 mg 3 times daily as needed for pain  ** Glucocorticoids: None  ** Ca/Vit D / bisphosphonate / PCP prophylaxis: Not applicable  Monitoring / further work-up:   --Diagnostic labs: As noted below  --DMARD monitoring labs:   **Check today  --chronic hepatitis B/C negative: Check today  --QuantiFERON TB testing negative: Check today  --ophthalmology (HCQ toxicity monitoring / uveitis): Not applicable  Consults:   **Dermatology evaluation for psoriasis  Imaging:   --X-rays / CT / MRI:   **None today  --DXA: Not applicable  Vaccinations / prev med:    --Immunizations:  see below   --Counseling: see other specified counseling below   F/U plan:    --Return if symptoms worsen or fail to improve. . Follow-up sooner if acute issues arise;   --RTC precautions provided for specific signs/symptoms concerning for emerging/worsening autoimmune disease. Patient educated during the visit regarding medications and management plan and given the opportunity to ask questions.    - Adult Dermatology  Referral; Future  - Hepatitis B core antibody IgM; Future  - Hepatitis B Surface Antibody; Future  - Hepatitis B surface antigen; Future  - Hepatitis C antibody; Future  - Quantiferon-TB Gold Plus; Future  - ustekinumab (STELARA) 90 MG/ML; Inject 90 mg under the skin at week 0, 4, then every 12 weeks thereafter. Hold for signs of infection, and seek medical attention.  Dispense: 3 mL; Refill: 1  - CBC with Platelets & Differential; Future  - Hepatitis B core antibody IgM  - Hepatitis B Surface Antibody  - Hepatitis B surface antigen  - Hepatitis C antibody  - Quantiferon-TB Gold  Plus  - CBC with Platelets & Differential    Lateral epicondylitis of left elbow  Self-limited left lateral epicondylitis.  Not typical of chronic/recalcitrant enthesitis related to psoriatic arthritis.  Now resolved.  Continue to monitor.    Obesity (BMI 30-39.9)  Obesity may exacerbate osteoarthritis and may in some cases contribute to low level elevation in inflammatory markers (ESR, CRP). Will avoid steroids as possible, which may contribute to weight gain. Encourage continued attempts at weight loss. Continue to follow over time as this may impact medication dosages.  Follow-up with PCP for continued management.    Other osteoarthritis involving multiple joints  Does have osteoarthritis as well, which can also cause signficant joint pain and which will need to be differentiated from inflammatory arthritis flares.    --Encourage exercise, maintenance of a healthy weight through diet/exercise (e.g. water therapy, jeff chi). May also benefit from dietician / weight management evaluations.   --Can take systemic NSAIDs (sparingly) as comorbidities allow.  Avoid systemic NSAIDs when on steroids.   --Can take Tylenol up to 1000 mg 3 times daily as needed.    --Voltaren gel topically 4 times daily is an option. Can also try topical lidocaine OTC preparations, topical capsacin, topical biofreeze.   --Option for Steroid injections / viscosupplementation in the future as needed.   --PT/OT evaluation/treatment and orthopedics treatment as needed for worsening disease.    Immunocompromised patient  Immunocompromised due to underlying disease compounded by immunosuppressive treatments.  Recommend social distancing and masking in high-risk situations if appropriate, avoidance of close/prolonged contact with sick individuals.  Immunization recommendations as below.    Encounter for screening for other viral diseases  Hepatitis/tuberculosis testing update prior to starting Stelara  - Hepatitis B Surface Antibody; Future  -  Hepatitis B surface antigen; Future  - Hepatitis B Surface Antibody  - Hepatitis B surface antigen    Other long term (current) drug therapy  --Monitoring labs/toxicity screening as above    Other specified counseling  --Discussed diagnosis, evaluation, management. Educational handouts have been provided for Stelara, psoriatic arthritis.  --Close follow-up with PCP for routine cancer screening, optimization of cardiovascular risk management reduction (BP, lipids).  --Avoid smoking or secondhand smoke as able, for general health benefits and as this may actually have a role in increasing autoimmune disease activity and decrease drug activity in certain situations.   --Encourage use of sunscreen / sun protective clothing as this may contribute to autoimmune diseases which are photosensitive and given increased malignancy risk seen with autoimmune disease and immunosuppressive treatments.   --avoid or limit ETOH, may impact risk of hepatotoxicity with current or future DMARD medications    Underimmunization status  Vaccinations: Reviewed  Immunization History   Administered Date(s) Administered    COVID-19 12+ (Pfizer) 01/04/2024, 10/07/2024    COVID-19 Bivalent 18+ (Moderna) 10/28/2022    COVID-19 Monovalent 18+ (Moderna) 02/26/2021, 03/26/2021, 11/01/2021, 05/21/2022    Influenza (High Dose) Trivalent,PF (Fluzone) 10/28/2022, 10/07/2024    Influenza (IIV3) PF 10/18/2007    Influenza Vaccine 65+ (Fluzone HD) 10/04/2020, 10/27/2021, 10/28/2022, 09/28/2023    Influenza Vaccine >6 months,quad, PF 11/01/2017    Pneumo Conj 13-V (2010&after) 07/19/2018    Pneumococcal 23 valent 08/05/2019    RSV Vaccine (Arexvy) 01/04/2024    TD,PF 7+ (Tenivac) 10/06/2008    TDAP (Adacel,Boostrix) 05/04/2017    Zoster recombinant adjuvanted (SHINGRIX) 09/29/2023, 01/03/2024    Zoster vaccine, live 03/17/2016      Plan:  Immunizations Recommended:     Influenza vaccine and COVID-19 booster annually in the fall; up-to-date  currently    --NOTE: recommend to hold any DMARDs for 1 week after receiving immunizations to optimize  immune response, and try to time immunizations near the end of each infusion cycle  (about  2 weeks before next infusion). If on steroids, would wait until dosage < 20 mg prednisone (or equivalent), optimally <10 mg, prior to receiving immunizations, to optimize response.     Champ Sandoval MD, Rheumatology     60 minutes were spent on this patient on this date for a face-to-face visit, reviewing medical records, developing a treatment plan, counseling/discussing treatment strategy, coordinating care and documenting.

## 2025-02-18 NOTE — NURSING NOTE
"Chief Complaint   Patient presents with    Immunosupressed       Initial Pulse 69   Temp 97.3  F (36.3  C) (Temporal)   Ht 1.689 m (5' 6.5\")   Wt 111.9 kg (246 lb 11.2 oz)   SpO2 92%   BMI 39.22 kg/m   Estimated body mass index is 39.22 kg/m  as calculated from the following:    Height as of this encounter: 1.689 m (5' 6.5\").    Weight as of this encounter: 111.9 kg (246 lb 11.2 oz).  Medication Review: complete    Belinda Mclaughlin      "

## 2025-02-18 NOTE — PATIENT INSTRUCTIONS
Rheumatology Clinic - [facility]  Patient Check-Out Instruction Sheet    It was very nice to see you today. We will discuss the results of any labs/imaging at your follow-up appointment if applicable, or if you have any questions/concerns or need clarification sooner please don't hesitate to reach out to us via [employer] or call our clinic at [phone]. To send us prior medical records, lab/imaging results or Plaquenil eye screening results (if applicable), please have information faxed to [phone] - addressed to Dr. Sandoval, rheumatology clinic ([facility]).  Please complete the following [ X ] as soon as possible:    [ x ] Lab:  --Go to the lab and have labs completed today if applicable (you will need to call or stop at the  today to schedule any future lab draws):      Have labs completed today to prepare for Stelara initiation    [ x ] Pharmacy:  --Go to the pharmacy to pick-up any new medications / refills.  --Recommendations:     Plan to start Stelara injections 90 mg under the skin at weeks 0, 4 weeks later, then every 12 weeks thereafter - this has been ordered and can be prescribed in the future by dermatology     NOTE:  if you are taking a medication that suppresses your immune system and you develop an infection requiring treatment, please hold your immunosuppressive medication and let Dr. Sandoval know immediately    [ x ] Consults:  --Schedule appointments for the following:    Dermatology referral ordered (dermatology professionals-have location in [place]) for continued management of psoriasis, given no arthritis (i.e. no psoriatic arthritis)    [ x ] Follow-up visit in Rheumatology:  --Schedule a follow-up appointment in rheumatology with Dr. Sandoval for any new or worsening signs/symptoms concerning for emerging autoimmune disease, including the following:    New/worsening fevers, unplanned weight loss, severe dry eyes/dry mouth, frequent oral/nasal/genital ulcers,  inflammatory eye disease (iritis, uveitis, scleritis), severe hair loss, persistently swollen lymph nodes, progressively worsening cough/shortness of breath or inflammation around the heart or in the lungs (interstitial lung disease, pericarditis, pleuritis, endocarditis, myocarditis), significant blood in the stools or dark tarry stools or severe diarrhea, persistently red/hot/swollen joints, prolonged stiffness longer than an hour in the morning, chronic tendinitis (plantar fasciitis, Achilles tendinitis), inflammatory low back pain (stiffness longer than an hour in the morning, improving with activity not with rest), Raynaud's phenomenon (white/blue/red color changes in the fingertips/toes).

## 2025-02-19 ENCOUNTER — TELEPHONE (OUTPATIENT)
Facility: OTHER | Age: 70
End: 2025-02-19
Payer: COMMERCIAL

## 2025-02-19 NOTE — TELEPHONE ENCOUNTER
PA Initiation    Medication: STELARA 90 MG/ML [facility]  Insurance Company: [employer] - Phone [phone] Fax [phone]  Pharmacy Filling the Rx:    Filling Pharmacy Phone:    Filling Pharmacy Fax:    Start Date: 2/19/2025    Key: [id number]

## 2025-02-19 NOTE — TELEPHONE ENCOUNTER
PRIOR AUTHORIZATION DENIED    Medication: STELARA 90 MG/ML [facility]  Insurance Company: [employer] - Phone [phone] Fax [phone]  Denial Date: 1/28/2025  Denial Reason(s): Denied due to not being prescribed by or in consultation with a dermatologist  Appeal Information:     Patient Notified: No

## 2025-02-19 NOTE — TELEPHONE ENCOUNTER
PA was previously denied due to being prescribed by a family practice provider.  We will need to appeal.  Waiting on office notes to send to plan to show the PA is now being requested by a Rheumatologist.  Sending to provider to notify and ask him to notify liaison once the office notes are complete.

## 2025-02-20 LAB
GAMMA INTERFERON BACKGROUND BLD IA-ACNC: 0.01 IU/ML
M TB IFN-G BLD-IMP: NEGATIVE
M TB IFN-G CD4+ BCKGRND COR BLD-ACNC: 9.99 IU/ML
MITOGEN IGNF BCKGRD COR BLD-ACNC: 0.04 IU/ML
MITOGEN IGNF BCKGRD COR BLD-ACNC: 0.05 IU/ML
QUANTIFERON MITOGEN: 10 IU/ML
QUANTIFERON NIL TUBE: 0.01 IU/ML
QUANTIFERON TB1 TUBE: 0.06 IU/ML
QUANTIFERON TB2 TUBE: 0.05

## 2025-03-03 NOTE — TELEPHONE ENCOUNTER
Sent office notes and denial to insurance showing Stelara is prescribed by rheumatologist and asked them to reconsider coverage

## 2025-03-06 NOTE — TELEPHONE ENCOUNTER
PRIOR AUTHORIZATION DENIED    Medication: STELARA 90 MG/ML [facility]  Insurance Company: [employer] - Phone [phone] Fax [phone]  Denial Date: 1/28/2025  Denial Reason(s): Must be prescribed by a dermatologist  Appeal Information:   Patient Notified: yes

## 2025-03-08 DIAGNOSIS — I10 BENIGN ESSENTIAL HYPERTENSION: ICD-10-CM

## 2025-03-10 RX ORDER — LOSARTAN POTASSIUM AND HYDROCHLOROTHIAZIDE 25; 100 MG/1; MG/1
1 TABLET ORAL DAILY
Qty: 90 TABLET | Refills: 4 | OUTPATIENT
Start: 2025-03-10

## 2025-03-10 NOTE — TELEPHONE ENCOUNTER
[facility] in #[id number] in [facility] [place] sent Rx request for the following:      Redundant refill request refused: Too soon:  losartan-hydrochlorothiazide (HYZAAR) 100-25 MG tablet 90 tablet 4 1/15/2025 -- No   Sig - Route: Take 1 tablet by mouth daily. - Oral   Sent to pharmacy as: Losartan Potassium-HCTZ 100-25 MG Oral Tablet (HYZAAR)   Class: E-Prescribe   Order: [id number]   E-Prescribing Status: Receipt confirmed by pharmacy (1/15/2025  8:53 AM CST)     Printout Tracking    External Result Report     Pharmacy    [facility] [id number] [facility] - [place], [place] - [address]     Heather Holly RN .............. 3/10/2025  2:28 PM

## 2025-03-14 ENCOUNTER — TELEPHONE (OUTPATIENT)
Dept: INTERNAL MEDICINE | Facility: OTHER | Age: 70
End: 2025-03-14
Payer: COMMERCIAL

## 2025-03-14 NOTE — TELEPHONE ENCOUNTER
ustekinumab (STELARA) 90 MG/ML     Patient received this med in the mail today and wondering what he should do with it?    Please call back     Esmer Flaherty on 3/14/2025 at 3:48 PM

## 2025-03-17 NOTE — TELEPHONE ENCOUNTER
Left detailed message on Bartolo's personalized voicemail but followed up with a [facility] message with read notification.    Dr Sandoval ordered Stelara (Ustekinumab) to get the ball rolling until Bartolo can visit with Dermatology & they take over management of this medication.    If he still has a Humira pen left, he can use it. When he would be due for the next injection, he would use Stelara (Ustekinumab) instead. It's given at 0. 4 & 12 weeks.    Stelara (Ustekinumab) auto injector will work essentially the same as Humira (Adalimumab), but if he has any concerns, he can contact [facility] ([facility] at [phone] & ask for an appointment with the Procedure Nurse who can walk him through it with him giving the actual injection.

## 2025-03-19 DIAGNOSIS — L40.9 PSORIASIS: ICD-10-CM

## 2025-03-19 DIAGNOSIS — D84.9 IMMUNOSUPPRESSED STATUS: ICD-10-CM

## 2025-03-24 ENCOUNTER — HOSPITAL ENCOUNTER (OUTPATIENT)
Dept: GENERAL RADIOLOGY | Facility: OTHER | Age: 70
Discharge: HOME | End: 2025-03-24
Payer: MEDICARE

## 2025-03-24 ENCOUNTER — OFFICE VISIT (OUTPATIENT)
Dept: FAMILY MEDICINE | Facility: OTHER | Age: 70
End: 2025-03-24
Payer: MEDICARE

## 2025-03-24 VITALS
BODY MASS INDEX: 38.63 KG/M2 | OXYGEN SATURATION: 93 % | SYSTOLIC BLOOD PRESSURE: 130 MMHG | DIASTOLIC BLOOD PRESSURE: 86 MMHG | HEART RATE: 102 BPM | WEIGHT: 243 LBS | RESPIRATION RATE: 20 BRPM | TEMPERATURE: 97.4 F

## 2025-03-24 DIAGNOSIS — R06.02 SOB (SHORTNESS OF BREATH): ICD-10-CM

## 2025-03-24 DIAGNOSIS — J06.9 VIRAL URI WITH COUGH: Primary | ICD-10-CM

## 2025-03-24 DIAGNOSIS — R05.1 ACUTE COUGH: ICD-10-CM

## 2025-03-24 DIAGNOSIS — R09.89 CHEST CONGESTION: ICD-10-CM

## 2025-03-24 LAB
FLUAV RNA SPEC QL NAA+PROBE: NEGATIVE
FLUBV RNA RESP QL NAA+PROBE: NEGATIVE
RSV RNA SPEC NAA+PROBE: NEGATIVE
SARS-COV-2 RNA RESP QL NAA+PROBE: NEGATIVE

## 2025-03-24 PROCEDURE — 87637 SARSCOV2&INF A&B&RSV AMP PRB: CPT | Mod: ZL

## 2025-03-24 PROCEDURE — 3079F DIAST BP 80-89 MM HG: CPT

## 2025-03-24 PROCEDURE — 3075F SYST BP GE 130 - 139MM HG: CPT

## 2025-03-24 PROCEDURE — 71046 X-RAY EXAM CHEST 2 VIEWS: CPT

## 2025-03-24 PROCEDURE — 1126F AMNT PAIN NOTED NONE PRSNT: CPT

## 2025-03-24 PROCEDURE — G0463 HOSPITAL OUTPT CLINIC VISIT: HCPCS | Mod: 25

## 2025-03-24 PROCEDURE — 99213 OFFICE O/P EST LOW 20 MIN: CPT

## 2025-03-24 ASSESSMENT — PAIN SCALES - GENERAL: PAINLEVEL_OUTOF10: NO PAIN (0)

## 2025-03-24 ASSESSMENT — ENCOUNTER SYMPTOMS
DIARRHEA: 0
CHILLS: 1
COUGH: 1
SINUS PRESSURE: 0
WHEEZING: 0
CARDIOVASCULAR NEGATIVE: 1
FEVER: 1
SINUS PAIN: 0
SORE THROAT: 0
NAUSEA: 0
FATIGUE: 1
VOMITING: 0
SHORTNESS OF BREATH: 1

## 2025-03-24 ASSESSMENT — ASTHMA QUESTIONNAIRES: ACT_TOTALSCORE: 25

## 2025-03-24 NOTE — PROGRESS NOTES
Cecilio Nolan  1955    ASSESSMENT/PLAN    1. Viral URI with cough (Primary)  2. SOB (shortness of breath)  - XR Chest 2 Views  3. Chest congestion  4. Acute cough  - Influenza A/B, RSV and SARS-CoV2 PCR (COVID-19) Nose  - XR Chest 2 Views    Chest x-ray completed  Per radiology: No acute disease.    - Influenza, COVID, and RSV testing negative.  - Recommend Mucinex DM every 12 hours as needed  - Discussed with patient that symptoms and exam are consistent with viral illness.    - No clinical indications for antibiotic treatment at this time.  - Symptomatic treatment - Encouraged fluids, salt water gargles, honey, humidifier, saline nasal spray, lozenges, tea, soup, smoothies, popsicles, topical vapor rub, rest, etc   - May use over-the-counter Tylenol or ibuprofen PRN  - Follow up as needed for new or worsening symptoms          *Explanation of diagnosis, treatment options and risk and benefits of medications reviewed with patient. Patient agrees with plan of care.  *All questions were answered.    *Red flags symptoms were discussed and patient was advised when they should return for reevaluation or for prompt emergency evaluation.   *Patient was given verbal and written instructions on plan of care. Instructions were printed or are available on [employer] on electronic AVS.   *We discussed potential side effects of any prescribed or recommended therapies, as well as expectations for response to treatments.  *Patient discharged in stable condition    Elgin Gregory, DNP, APRN, FNP-C  [facility]    SUBJECTIVE  CHIEF COMPLAINT/ REASON FOR VISIT  Patient presents with:  URI: Cough, fever, chills, chest congestion and runny nose x 4 days     HISTORY OF PRESENT ILLNESS  Cecilio Nolan is a pleasant 70 year old male presents to rapid clinic today with cough.  Over the last 4 days patient has been dealing with a productive cough, chest congestion, fever, body aches, chills, mild shortness of  breath, but reports no known exposures.  Patient has used over-the-counter cough syrup with no improvement.  Patient denies any nausea, vomiting or diarrhea.    History provided by patient      I have reviewed the nursing notes.  I have reviewed allergies, medication list, problem list, and past medical history.    REVIEW OF SYSTEMS  Review of Systems   Constitutional:  Positive for chills, fatigue and fever.   HENT:  Positive for congestion. Negative for ear pain, sinus pressure, sinus pain and sore throat.    Respiratory:  Positive for cough and shortness of breath. Negative for wheezing.    Cardiovascular: Negative.    Gastrointestinal:  Negative for diarrhea, nausea and vomiting.   Skin: Negative.         VITAL SIGNS  Vitals:    03/24/25 0952 03/24/25 0956   BP: (!) 154/94 130/86   Pulse: 102    Resp: 20    Temp: 97.4  F (36.3  C)    TempSrc: Tympanic    SpO2: 93%    Weight: 110.2 kg (243 lb)       Body mass index is 38.63 kg/m .      OBJECTIVE  PHYSICAL EXAM  Physical Exam  Vitals and nursing note reviewed.   Constitutional:       General: He is not in acute distress.     Appearance: Normal appearance. He is normal weight. He is not ill-appearing, toxic-appearing or diaphoretic.   HENT:      Head: Normocephalic and atraumatic.      Right Ear: Tympanic membrane, ear canal and external ear normal. There is no impacted cerumen.      Left Ear: Tympanic membrane, ear canal and external ear normal. There is no impacted cerumen.      Nose: Nose normal. No congestion or rhinorrhea.      Mouth/Throat:      Mouth: Mucous membranes are moist.      Pharynx: Oropharynx is clear. No oropharyngeal exudate or posterior oropharyngeal erythema.   Eyes:      Extraocular Movements: Extraocular movements intact.      Conjunctiva/sclera: Conjunctivae normal.      Pupils: Pupils are equal, round, and reactive to light.   Cardiovascular:      Rate and Rhythm: Normal rate and regular rhythm.      Pulses: Normal pulses.      Heart  sounds: Normal heart sounds.   Pulmonary:      Effort: Pulmonary effort is normal. No respiratory distress.      Breath sounds: Normal breath sounds. No wheezing or rhonchi.   Musculoskeletal:      Cervical back: Normal range of motion. No rigidity or tenderness.   Lymphadenopathy:      Cervical: No cervical adenopathy.   Neurological:      Mental Status: He is alert.            DIAGNOSTICS  Results for orders placed or performed in visit on 03/24/25   XR Chest 2 Views     Status: None    Narrative    PROCEDURE: XR CHEST 2 VIEWS 3/24/2025 10:16 AM    HISTORY: Acute cough; SOB (shortness of breath)    COMPARISONS: 7/26/2021.    TECHNIQUE: 2 views.    FINDINGS: Heart and pulmonary vasculature are normal. Lungs are clear  and no pleural effusion is seen. There is eventration of the anterior  right hemidiaphragm, a stable finding.    Degenerative change is seen in the spine.         Impression    IMPRESSION: No acute disease.    MIRNA DARNELL MD         SYSTEM ID:  [id number]   Influenza A/B, RSV and SARS-CoV2 PCR (COVID-19) Nose     Status: Normal    Specimen: Nose; Swab   Result Value Ref Range    Influenza A PCR Negative Negative    Influenza B PCR Negative Negative    RSV PCR Negative Negative    SARS CoV2 PCR Negative Negative    Narrative    Testing was performed using the Xpert Xpress CoV2/Flu/RSV Assay on the [employer] Instrument. This test should be ordered for the detection of SARS-CoV2, influenza, and RSV viruses in individuals with signs and symptoms of respiratory tract infection. This test is for in vitro diagnostic use under the [place] FDA for laboratories certified under CLIA to perform high or moderate complexity testing. This test has been [place] FDA cleared. A negative result does not rule out the presence of PCR inhibitors in the specimen or target RNA in concentration below the limit of detection for the assay. If only one viral target is positive but coinfection with multiple targets is  suspected, the sample should be re-tested with another FDA cleared, approved, or authorized test, if coninfection would change clinical management. This test was validated by the [facility]. These laboratories are certified under the Clinical Laboratory Improvement Amendments of 1988 (CLIA-88) as qualified to perfom high complexity laboratory testing.

## 2025-03-24 NOTE — NURSING NOTE
"Chief Complaint   Patient presents with    URI     Cough, fever, chills, chest congestion and runny nose x 4 days       Initial /86   Pulse 102   Temp 97.4  F (36.3  C) (Tympanic)   Resp 20   Wt 110.2 kg (243 lb)   SpO2 93%   BMI 38.63 kg/m   Estimated body mass index is 38.63 kg/m  as calculated from the following:    Height as of 2/18/25: 1.689 m (5' 6.5\").    Weight as of this encounter: 110.2 kg (243 lb).  Medication Review: complete    The next two questions are to help us understand your food security.  If you are feeling you need any assistance in this area, we have resources available to support you today.          1/15/2025   SDOH- Food Insecurity   Within the past 12 months, did you worry that your food would run out before you got money to buy more? N   Within the past 12 months, did the food you bought just not last and you didn t have money to get more? N         Health Care Directive:  Patient has a Health Care Directive on file      Norma J. Gosselin, LPN      "

## 2025-04-04 ENCOUNTER — TRANSFERRED RECORDS (OUTPATIENT)
Dept: HEALTH INFORMATION MANAGEMENT | Facility: OTHER | Age: 70
End: 2025-04-04
Payer: COMMERCIAL

## 2025-04-05 ENCOUNTER — HEALTH MAINTENANCE LETTER (OUTPATIENT)
Age: 70
End: 2025-04-05

## 2025-04-23 RX ORDER — ALBUTEROL SULFATE 90 UG/1
1-2 INHALANT RESPIRATORY (INHALATION) EVERY 4 HOURS PRN
Qty: 18 G | Refills: 11 | Status: CANCELLED | OUTPATIENT
Start: 2025-04-23

## 2025-04-24 ENCOUNTER — LAB (OUTPATIENT)
Dept: LAB | Facility: OTHER | Age: 70
End: 2025-04-24
Attending: INTERNAL MEDICINE
Payer: MEDICARE

## 2025-04-24 ENCOUNTER — OFFICE VISIT (OUTPATIENT)
Dept: INTERNAL MEDICINE | Facility: OTHER | Age: 70
End: 2025-04-24
Attending: INTERNAL MEDICINE
Payer: MEDICARE

## 2025-04-24 VITALS
RESPIRATION RATE: 20 BRPM | BODY MASS INDEX: 38.83 KG/M2 | HEIGHT: 66 IN | DIASTOLIC BLOOD PRESSURE: 78 MMHG | OXYGEN SATURATION: 94 % | TEMPERATURE: 96.9 F | HEART RATE: 88 BPM | WEIGHT: 241.6 LBS | SYSTOLIC BLOOD PRESSURE: 128 MMHG

## 2025-04-24 DIAGNOSIS — I10 BENIGN ESSENTIAL HYPERTENSION: ICD-10-CM

## 2025-04-24 DIAGNOSIS — L40.9 PSORIASIS: ICD-10-CM

## 2025-04-24 DIAGNOSIS — N18.2 TYPE 2 DIABETES MELLITUS WITH STAGE 2 CHRONIC KIDNEY DISEASE, WITHOUT LONG-TERM CURRENT USE OF INSULIN (H): ICD-10-CM

## 2025-04-24 DIAGNOSIS — E11.22 TYPE 2 DIABETES MELLITUS WITH STAGE 2 CHRONIC KIDNEY DISEASE, WITHOUT LONG-TERM CURRENT USE OF INSULIN (H): Primary | ICD-10-CM

## 2025-04-24 DIAGNOSIS — E11.22 TYPE 2 DIABETES MELLITUS WITH STAGE 2 CHRONIC KIDNEY DISEASE, WITHOUT LONG-TERM CURRENT USE OF INSULIN (H): ICD-10-CM

## 2025-04-24 DIAGNOSIS — E66.01 CLASS 2 SEVERE OBESITY DUE TO EXCESS CALORIES WITH SERIOUS COMORBIDITY AND BODY MASS INDEX (BMI) OF 39.0 TO 39.9 IN ADULT (H): ICD-10-CM

## 2025-04-24 DIAGNOSIS — E78.2 MIXED HYPERLIPIDEMIA: ICD-10-CM

## 2025-04-24 DIAGNOSIS — J01.10 ACUTE NON-RECURRENT FRONTAL SINUSITIS: ICD-10-CM

## 2025-04-24 DIAGNOSIS — N18.2 TYPE 2 DIABETES MELLITUS WITH STAGE 2 CHRONIC KIDNEY DISEASE, WITHOUT LONG-TERM CURRENT USE OF INSULIN (H): Primary | ICD-10-CM

## 2025-04-24 DIAGNOSIS — E66.812 CLASS 2 SEVERE OBESITY DUE TO EXCESS CALORIES WITH SERIOUS COMORBIDITY AND BODY MASS INDEX (BMI) OF 39.0 TO 39.9 IN ADULT (H): ICD-10-CM

## 2025-04-24 LAB
ALBUMIN SERPL BCG-MCNC: 4.1 G/DL (ref 3.5–5.2)
ALP SERPL-CCNC: 148 U/L (ref 40–150)
ALT SERPL W P-5'-P-CCNC: 60 U/L (ref 0–70)
ANION GAP SERPL CALCULATED.3IONS-SCNC: 13 MMOL/L (ref 7–15)
AST SERPL W P-5'-P-CCNC: 40 U/L (ref 0–45)
BILIRUB SERPL-MCNC: 0.5 MG/DL
BUN SERPL-MCNC: 14.4 MG/DL (ref 8–23)
CALCIUM SERPL-MCNC: 9.4 MG/DL (ref 8.8–10.4)
CHLORIDE SERPL-SCNC: 101 MMOL/L (ref 98–107)
CHOLEST SERPL-MCNC: 148 MG/DL
CREAT SERPL-MCNC: 0.86 MG/DL (ref 0.67–1.17)
EGFRCR SERPLBLD CKD-EPI 2021: >90 ML/MIN/1.73M2
ERYTHROCYTE [DISTWIDTH] IN BLOOD BY AUTOMATED COUNT: 13.2 % (ref 10–15)
EST. AVERAGE GLUCOSE BLD GHB EST-MCNC: 163 MG/DL
FASTING STATUS PATIENT QL REPORTED: YES
FASTING STATUS PATIENT QL REPORTED: YES
GLUCOSE SERPL-MCNC: 150 MG/DL (ref 70–99)
HBA1C MFR BLD: 7.3 %
HCO3 SERPL-SCNC: 24 MMOL/L (ref 22–29)
HCT VFR BLD AUTO: 46 % (ref 40–53)
HDLC SERPL-MCNC: 54 MG/DL
HGB BLD-MCNC: 15.5 G/DL (ref 13.3–17.7)
LDLC SERPL CALC-MCNC: 74 MG/DL
MCH RBC QN AUTO: 31.5 PG (ref 26.5–33)
MCHC RBC AUTO-ENTMCNC: 33.7 G/DL (ref 31.5–36.5)
MCV RBC AUTO: 94 FL (ref 78–100)
NONHDLC SERPL-MCNC: 94 MG/DL
PLATELET # BLD AUTO: 213 10E3/UL (ref 150–450)
POTASSIUM SERPL-SCNC: 3.9 MMOL/L (ref 3.4–5.3)
PROT SERPL-MCNC: 7.3 G/DL (ref 6.4–8.3)
RBC # BLD AUTO: 4.92 10E6/UL (ref 4.4–5.9)
SODIUM SERPL-SCNC: 138 MMOL/L (ref 135–145)
TRIGL SERPL-MCNC: 99 MG/DL
TSH SERPL DL<=0.005 MIU/L-ACNC: 3 UIU/ML (ref 0.3–4.2)
WBC # BLD AUTO: 8.3 10E3/UL (ref 4–11)

## 2025-04-24 PROCEDURE — G0463 HOSPITAL OUTPT CLINIC VISIT: HCPCS

## 2025-04-24 PROCEDURE — 85018 HEMOGLOBIN: CPT | Mod: ZL

## 2025-04-24 PROCEDURE — 36415 COLL VENOUS BLD VENIPUNCTURE: CPT | Mod: ZL

## 2025-04-24 PROCEDURE — 80061 LIPID PANEL: CPT | Mod: ZL

## 2025-04-24 PROCEDURE — 84443 ASSAY THYROID STIM HORMONE: CPT | Mod: ZL

## 2025-04-24 PROCEDURE — 80053 COMPREHEN METABOLIC PANEL: CPT | Mod: ZL

## 2025-04-24 PROCEDURE — 83036 HEMOGLOBIN GLYCOSYLATED A1C: CPT | Mod: ZL

## 2025-04-24 RX ORDER — BLOOD SUGAR DIAGNOSTIC
STRIP MISCELLANEOUS
Qty: 100 STRIP | Refills: 4 | Status: SHIPPED | OUTPATIENT
Start: 2025-04-24

## 2025-04-24 ASSESSMENT — ENCOUNTER SYMPTOMS
ABDOMINAL PAIN: 0
COUGH: 0
LIGHT-HEADEDNESS: 0
ARTHRALGIAS: 1
DYSURIA: 0
WOUND: 0
MYALGIAS: 0
VOMITING: 0
WHEEZING: 0
CONFUSION: 0
DIARRHEA: 0
NAUSEA: 0
CHILLS: 0
SLEEP DISTURBANCE: 1
FEVER: 0
AGITATION: 0
SHORTNESS OF BREATH: 0
BRUISES/BLEEDS EASILY: 0
DIZZINESS: 0
HEMATURIA: 0

## 2025-04-24 ASSESSMENT — PAIN SCALES - GENERAL: PAINLEVEL_OUTOF10: MODERATE PAIN (5)

## 2025-04-24 NOTE — NURSING NOTE
"Patient presents to clinic today for diabetic follow up.   Patient has been sick x4 weeks with cold and sinus symptoms.    Chief Complaint   Patient presents with    Follow Up     Diabetic       FOOD SECURITY SCREENING QUESTIONS  Hunger Vital Signs:  Within the past 12 months we worried whether our food would run out before we got money to buy more. Never  Within the past 12 months the food we bought just didn't last and we didn't have money to get more. Never  Heather Dearmon 4/24/2025 8:03 AM      Initial /78 (BP Location: Right arm, Patient Position: Sitting, Cuff Size: Adult Large)   Pulse 88   Temp 96.9  F (36.1  C) (Tympanic)   Resp 20   Ht 1.676 m (5' 6\")   Wt 109.6 kg (241 lb 9.6 oz)   SpO2 94%   BMI 39.00 kg/m   Estimated body mass index is 39 kg/m  as calculated from the following:    Height as of this encounter: 1.676 m (5' 6\").    Weight as of this encounter: 109.6 kg (241 lb 9.6 oz).  Medication Reconciliation: complete    Heather Deabenon  "

## 2025-04-24 NOTE — PATIENT INSTRUCTIONS
Blood pressure is well controlled.   Diabetes is well controlled.     Medications refilled.   Labs are stable.     Glad your Psoriasis is doing well at this time!    Consider some Generic Mucinex to help with any phlegm/mucus.... may help with your cold / illness.         Results for orders placed or performed in visit on 04/24/25   CBC with platelets     Status: Normal   Result Value Ref Range    WBC Count 8.3 4.0 - 11.0 10e3/uL    RBC Count 4.92 4.40 - 5.90 10e6/uL    Hemoglobin 15.5 13.3 - 17.7 g/dL    Hematocrit 46.0 40.0 - 53.0 %    MCV 94 78 - 100 fL    MCH 31.5 26.5 - 33.0 pg    MCHC 33.7 31.5 - 36.5 g/dL    RDW 13.2 10.0 - 15.0 %    Platelet Count 213 150 - 450 10e3/uL   Hemoglobin A1c     Status: Abnormal   Result Value Ref Range    Estimated Average Glucose 163 (H) <117 mg/dL    Hemoglobin A1C 7.3 (H) <5.7 %      Aspects of Diabetes:   Recent Labs   Lab Test 04/24/25  0742 02/18/25  1214 01/15/25  0724 10/07/24  0732 07/02/24  0657   A1C 7.3*  --  6.9* 6.8* 7.4*   LDL  --   --  73 56 57   HDL  --   --  52 48 51   TRIG  --   --  90 85 85   ALT  --   --  62 68 55   CR  --   --  0.95 1.01 0.88   GFRESTIMATED  --   --  86 80 >90   POTASSIUM  --   --  3.9 3.8 3.7   TSH  --   --  2.60 3.47 2.52   WBC 8.3 9.5 9.0 7.2 7.2   HGB 15.5 16.1 16.6 15.8 15.4    204 193 186 186   ALBUMIN  --   --  4.1 4.1 4.1      Hemoglobin A1c  Goal range is under 8%. Best is 6.5 to 7   Blood Pressure 128/78 Goal to keep less than 140/90   Tobacco  reports that he quit smoking about 22 years ago. His smoking use included cigarettes. He has never used smokeless tobacco. Goal to abstain from tobacco   Aspirin or Plavix Anti-platelet therapy Aspirin or Plavix reduces risk of heart disease and stroke  -- sometimes used with other blood thinners, depending on bleeding risk and risk factors.    ACE/ARB Specific blood pressure meds These medications can reduce risk of kidney disease   Cholesterol Statins (Lipitor, Crestor, vs others)  Statins reduce risk of heart disease and stroke   Eye Exam -- Do Yearly -- Annual diabetic eye exam   Healthy weight Wt Readings from Last 4 Encounters:   04/24/25 109.6 kg (241 lb 9.6 oz)   03/24/25 110.2 kg (243 lb)   02/18/25 111.9 kg (246 lb 11.2 oz)   01/15/25 108 kg (238 lb)      Body mass index is 39 kg/m .  Goal BMI under 30, best is under 25.      -- Trying to exercise daily (goal at least 20 min/day) with moderate aerobic activity   -- Eat healthy (resources from ADA at http://www.diabetes.org/)   -- Taking good care of my feet. Consider seeing the Podiatrist   -- Check blood sugars as directed, record in log book and bring to every appointment    Insurance companies are grading you and I on your blood sugar control -- Goal is to get your A1c down to 7.9% or lower and NO Smoking!  -- Medicare and most insurance companies, will only cover Hemoglobin A1c labs to be rechecked every 91+ days.      Return for Diabetes labs and clinic follow-up appointment every 3 to 4 months.    Schedule lab only appointment --- A few days AFTER: 7/23/25   Schedule clinic appointment with Dr. Samuels -- Same day as labs, or 1-2 days later.

## 2025-04-24 NOTE — PROGRESS NOTES
Assessment & Plan     ICD-10-CM    1. Type 2 diabetes mellitus with stage 2 chronic kidney disease, without long-term current use of insulin (H)  E11.22 metFORMIN (GLUCOPHAGE) 500 MG tablet    N18.2 blood glucose (ACCU-CHEK GUIDE TEST) test strip      2. Benign essential hypertension  I10       3. Class 2 severe obesity due to excess calories with serious comorbidity and body mass index (BMI) of 39.0 to 39.9 in adult (H)  E66.812     E66.01     Z68.39       4. Mixed hyperlipidemia  E78.2       5. Psoriasis - possibly resolved after Humira treatment  L40.9       6. Acute non-recurrent frontal sinusitis  J01.10          Patient presents for diabetes follow-up, as well as follow-up multiple issues.  Overall doing quite well with metformin.  Needs refills of testing supplies.    Weight is up slightly.  Now with BMI of 39.  Previously 37.    Psoriasis, currently resolved.  Saw dermatology.  They recommended to discontinue immunosuppressants at this time.  He was on Humira.  Never started Stelara.  Possibly had resolution of his psoriasis with the Humira.  Last used back in January 2025.  Currently doing well.  Continue monitoring at this time.    Acute, none recurrent frontal sinusitis.  Does get some coughing intermittently.  Some nasal drainage.  Some headaches the last few days.  Initially was viral type process.  Had a negative chest x-ray in [facility] clinic.  Symptomatic treatment.  Did have worsening of symptoms then improvement.  Now worsening again the last few days.  Overall doing reasonly well, other than still having some sinus pressure in the frontal sinuses.  Recommend trial of some  generic Mucinex to help with thinning of the phlegm or mucus as needed.    HYPERTENSION - Ongoing. Blood pressure is currently well controlled.  Medication side effects: None. Denies syncope or presyncope.  Continue current medications.   Medication list reviewed/updated. Refills completed as needed.      MIXED HYPERLIPIDEMIA.   Ongoing. LDL is at goal: No. Triglycerides are at goal: Yes.  Hopefully lifestyle modifications will improve cholesterol levels, otherwise will consider additional medication dose adjustments or medication changes.  Medication side effects reported: None.   Continue current medications for now. Medication list reviewed/updated. Refills completed as needed.  Recent Labs   Lab Test 04/24/25  0742 01/15/25  0724   CHOL 148 143   HDL 54 52   LDL 74 73   TRIG 99 90        OBESITY - Ongoing.  (See Encounter Diagnosis list above for obesity class / severity).  - Encourage continued maintenance / improvement in diet and exercise.   - Consider Nutrition / Dietician appointment.  - Weight loss would improve Hypertension, Cholesterol and Diabetes.      Chronic Kidney Disease, Stage 2 (GFR 60-89), chronic, ongoing.  Kidney function had been slowly declining.  Encourage NSAID avoidance.    Recent Labs   Lab Test 04/24/25  0742 01/15/25  0724   CR 0.86 0.95   GFRESTIMATED >90 86        Vaccine counseling completed.  Encourage routine / annual vaccinations.    Type 2 Diabetes Mellitus, with nephropathy.  Blood sugar control has been good with minimal hyperglycemia. Doing well with diet, oral agents, and exercise - xNO Insulin injections per day.  Medication list reviewed/updated. Refills completed as needed.    Complicating factors include but are not limited to: hypertension, hyperlipidemia, chronic kidney disease, and morbid obesity .     Recent Labs   Lab Test 04/24/25  0742 02/18/25  1214 01/15/25  0724 10/07/24  0732   A1C 7.3*  --  6.9* 6.8*   LDL 74  --  73 56   HDL 54  --  52 48   TRIG 99  --  90 85   ALT 60  --  62 68   CR 0.86  --  0.95 1.01   GFRESTIMATED >90  --  86 80   POTASSIUM 3.9  --  3.9 3.8   TSH 3.00  --  2.60 3.47   WBC 8.3 9.5 9.0 7.2   HGB 15.5 16.1 16.6 15.8    204 193 186   ALBUMIN 4.1  --  4.1 4.1     Results for orders placed or performed in visit on 04/24/25   Comprehensive metabolic panel      Status: Abnormal   Result Value Ref Range    Sodium 138 135 - 145 mmol/L    Potassium 3.9 3.4 - 5.3 mmol/L    Carbon Dioxide (CO2) 24 22 - 29 mmol/L    Anion Gap 13 7 - 15 mmol/L    Urea Nitrogen 14.4 8.0 - 23.0 mg/dL    Creatinine 0.86 0.67 - 1.17 mg/dL    GFR Estimate >90 >60 mL/min/1.73m2    Calcium 9.4 8.8 - 10.4 mg/dL    Chloride 101 98 - 107 mmol/L    Glucose 150 (H) 70 - 99 mg/dL    Alkaline Phosphatase 148 40 - 150 U/L    AST 40 0 - 45 U/L    ALT 60 0 - 70 U/L    Protein Total 7.3 6.4 - 8.3 g/dL    Albumin 4.1 3.5 - 5.2 g/dL    Bilirubin Total 0.5 <=1.2 mg/dL    Patient Fasting > 8hrs? Yes    Lipid Profile     Status: None   Result Value Ref Range    Cholesterol 148 <200 mg/dL    Triglycerides 99 <150 mg/dL    Direct Measure HDL 54 >=40 mg/dL    LDL Cholesterol Calculated 74 <100 mg/dL    Non HDL Cholesterol 94 <130 mg/dL    Patient Fasting > 8hrs? Yes     Narrative    Cholesterol  Desirable: < 200 mg/dL  Borderline High: 200 - 239 mg/dL  High: >= 240 mg/dL    Triglycerides  Normal: < 150 mg/dL  Borderline High: 150 - 199 mg/dL  High: 200-499 mg/dL  Very High: >= 500 mg/dL    Direct Measure HDL  Female: >= 50 mg/dL   Male: >= 40 mg/dL    LDL Cholesterol  Desirable: < 100 mg/dL  Above Desirable: 100 - 129 mg/dL   Borderline High: 130 - 159 mg/dL   High:  160 - 189 mg/dL   Very High: >= 190 mg/dL    Non HDL Cholesterol  Desirable: < 130 mg/dL  Above Desirable: 130 - 159 mg/dL  Borderline High: 160 - 189 mg/dL  High: 190 - 219 mg/dL  Very High: >= 220 mg/dL   CBC with platelets     Status: Normal   Result Value Ref Range    WBC Count 8.3 4.0 - 11.0 10e3/uL    RBC Count 4.92 4.40 - 5.90 10e6/uL    Hemoglobin 15.5 13.3 - 17.7 g/dL    Hematocrit 46.0 40.0 - 53.0 %    MCV 94 78 - 100 fL    MCH 31.5 26.5 - 33.0 pg    MCHC 33.7 31.5 - 36.5 g/dL    RDW 13.2 10.0 - 15.0 %    Platelet Count 213 150 - 450 10e3/uL   Hemoglobin A1c     Status: Abnormal   Result Value Ref Range    Estimated Average Glucose 163 (H) <117  "mg/dL    Hemoglobin A1C 7.3 (H) <5.7 %   TSH with free T4 reflex     Status: Normal   Result Value Ref Range    TSH 3.00 0.30 - 4.20 uIU/mL      TSH is within normal limits.  Hemoglobin A1c is well-controlled.  CBC normal.  LDL is high and not at goal. Triglyceride Cholesterol levels are at goal.  Random glucose 150.  Liver enzymes normal.  Creatinine 0.86 with GFR greater than 90.    The longitudinal plan of care for the diagnosis(es)/condition(s) as documented were addressed during this visit. Due to the added complexity in care, I will continue to support Bartolo in the subsequent management and with ongoing continuity of care.              BMI  Estimated body mass index is 39 kg/m  as calculated from the following:    Height as of this encounter: 1.676 m (5' 6\").    Weight as of this encounter: 109.6 kg (241 lb 9.6 oz).         Return in about 3 months (around 7/24/2025) for - Labs every 91+ days, with DM Follow-up, Same Day or 1-2 days later with Dr. Samuels.      Alexander Samuels MD  [facility] [place]    Review of Systems   Constitutional:  Negative for chills and fever.   HENT:  Positive for congestion (Frontal sinuses with some headaches the past 3 days). Negative for hearing loss.    Eyes:  Negative for visual disturbance.   Respiratory:  Negative for cough, shortness of breath and wheezing.    Cardiovascular:  Negative for chest pain.   Gastrointestinal:  Negative for abdominal pain, diarrhea, nausea and vomiting.   Endocrine: Negative for cold intolerance and heat intolerance.   Genitourinary:  Negative for dysuria and hematuria.   Musculoskeletal:  Positive for arthralgias (occasional bilateral knee pain). Negative for myalgias.   Skin:  Negative for rash and wound.   Allergic/Immunologic: Negative for immunocompromised state (Previously Humira then Stelara - Now discontinued by Dermatology due to Psoriasis being resolved as of January 2025).   Neurological:  Negative for dizziness and " "light-headedness.   Hematological:  Does not bruise/bleed easily.   Psychiatric/Behavioral:  Positive for sleep disturbance (Hard time falling back to sleep after waking up). Negative for agitation and confusion.        Paco Mcfarland is a 70 year old, presenting for the following health issues:  Follow Up (Diabetic)        4/24/2025     7:58 AM   Additional Questions   Roomed by Heahter bryant lpn     History of Present Illness       Diabetes:   He presents for follow up of diabetes.  He is checking home blood glucose one time daily.   He checks blood glucose before meals.  Blood glucose is never over 200 and never under 70.  When his blood glucose is low, the patient is asymptomatic for confusion, blurred vision, lethargy and reports not feeling dizzy, shaky, or weak.   He has no concerns regarding his diabetes at this time.  He is having excessive thirst and none of these symptoms. He is not experiencing numbness or burning in feet, excessive thirst, blurry vision, weight changes or redness, sores or blisters on feet.           He eats 0-1 servings of fruits and vegetables daily.He consumes 0 sweetened beverage(s) daily.He exercises with enough effort to increase his heart rate 20 to 29 minutes per day.  He exercises with enough effort to increase his heart rate 3 or less days per week.   He is taking medications regularly.                      Objective    /78 (BP Location: Right arm, Patient Position: Sitting, Cuff Size: Adult Large)   Pulse 88   Temp 96.9  F (36.1  C) (Tympanic)   Resp 20   Ht 1.676 m (5' 6\")   Wt 109.6 kg (241 lb 9.6 oz)   SpO2 94%   BMI 39.00 kg/m    Body mass index is 39 kg/m .  Physical Exam  Constitutional:       General: He is not in acute distress.     Appearance: Normal appearance. He is well-developed. He is obese. He is not ill-appearing.   Eyes:      General: No scleral icterus.     Conjunctiva/sclera: Conjunctivae normal.   Neck:      Vascular: No carotid bruit. "   Cardiovascular:      Rate and Rhythm: Normal rate and regular rhythm.      Pulses: Normal pulses.   Pulmonary:      Effort: Pulmonary effort is normal. No respiratory distress.      Breath sounds: No wheezing.   Abdominal:      Palpations: Abdomen is soft.      Tenderness: There is no abdominal tenderness.   Musculoskeletal:         General: No tenderness or deformity.      Right lower leg: Edema (trace) present.      Left lower leg: Edema (trace) present.   Skin:     General: Skin is warm and dry.      Findings: No rash.   Neurological:      Mental Status: He is alert. Mental status is at baseline.      Cranial Nerves: No cranial nerve deficit.      Comments: Bilateral hearing loss - hearing aides bilaterally   Psychiatric:         Mood and Affect: Mood normal.         Behavior: Behavior normal.                    Signed Electronically by: Alexander Samuels MD

## 2025-08-04 ASSESSMENT — ASTHMA QUESTIONNAIRES: ACT_TOTALSCORE: 18

## 2025-08-08 ENCOUNTER — LAB (OUTPATIENT)
Dept: LAB | Facility: OTHER | Age: 70
End: 2025-08-08
Attending: INTERNAL MEDICINE
Payer: MEDICARE

## 2025-08-08 ENCOUNTER — OFFICE VISIT (OUTPATIENT)
Dept: INTERNAL MEDICINE | Facility: OTHER | Age: 70
End: 2025-08-08
Attending: INTERNAL MEDICINE
Payer: MEDICARE

## 2025-08-08 VITALS
OXYGEN SATURATION: 100 % | TEMPERATURE: 97.9 F | WEIGHT: 247.4 LBS | BODY MASS INDEX: 39.93 KG/M2 | SYSTOLIC BLOOD PRESSURE: 136 MMHG | DIASTOLIC BLOOD PRESSURE: 84 MMHG | HEART RATE: 100 BPM | RESPIRATION RATE: 16 BRPM

## 2025-08-08 DIAGNOSIS — L40.9 PSORIASIS: ICD-10-CM

## 2025-08-08 DIAGNOSIS — J45.990 EXERCISE-INDUCED ASTHMA: ICD-10-CM

## 2025-08-08 DIAGNOSIS — I10 BENIGN ESSENTIAL HYPERTENSION: ICD-10-CM

## 2025-08-08 DIAGNOSIS — E66.812 CLASS 2 SEVERE OBESITY DUE TO EXCESS CALORIES WITH SERIOUS COMORBIDITY AND BODY MASS INDEX (BMI) OF 39.0 TO 39.9 IN ADULT (H): ICD-10-CM

## 2025-08-08 DIAGNOSIS — N18.2 TYPE 2 DIABETES MELLITUS WITH STAGE 2 CHRONIC KIDNEY DISEASE, WITHOUT LONG-TERM CURRENT USE OF INSULIN (H): ICD-10-CM

## 2025-08-08 DIAGNOSIS — E78.2 MIXED HYPERLIPIDEMIA: ICD-10-CM

## 2025-08-08 DIAGNOSIS — E66.01 CLASS 2 SEVERE OBESITY DUE TO EXCESS CALORIES WITH SERIOUS COMORBIDITY AND BODY MASS INDEX (BMI) OF 39.0 TO 39.9 IN ADULT (H): ICD-10-CM

## 2025-08-08 DIAGNOSIS — E11.9 TYPE 2 DIABETES MELLITUS WITHOUT COMPLICATION, WITHOUT LONG-TERM CURRENT USE OF INSULIN (H): Primary | ICD-10-CM

## 2025-08-08 DIAGNOSIS — E11.22 TYPE 2 DIABETES MELLITUS WITH STAGE 2 CHRONIC KIDNEY DISEASE, WITHOUT LONG-TERM CURRENT USE OF INSULIN (H): ICD-10-CM

## 2025-08-08 LAB
ALBUMIN SERPL BCG-MCNC: 4 G/DL (ref 3.5–5.2)
ALBUMIN UR-MCNC: NEGATIVE MG/DL
ALP SERPL-CCNC: 132 U/L (ref 40–150)
ALT SERPL W P-5'-P-CCNC: 56 U/L (ref 0–70)
ANION GAP SERPL CALCULATED.3IONS-SCNC: 11 MMOL/L (ref 7–15)
APPEARANCE UR: CLEAR
AST SERPL W P-5'-P-CCNC: 37 U/L (ref 0–45)
BILIRUB SERPL-MCNC: 0.3 MG/DL
BILIRUB UR QL STRIP: NEGATIVE
BUN SERPL-MCNC: 19 MG/DL (ref 8–23)
CALCIUM SERPL-MCNC: 9.4 MG/DL (ref 8.8–10.4)
CHLORIDE SERPL-SCNC: 102 MMOL/L (ref 98–107)
CHOLEST SERPL-MCNC: 133 MG/DL
COLOR UR AUTO: YELLOW
CREAT SERPL-MCNC: 0.87 MG/DL (ref 0.67–1.17)
CREAT UR-MCNC: 196.2 MG/DL
EGFRCR SERPLBLD CKD-EPI 2021: >90 ML/MIN/1.73M2
ERYTHROCYTE [DISTWIDTH] IN BLOOD BY AUTOMATED COUNT: 12.6 % (ref 10–15)
EST. AVERAGE GLUCOSE BLD GHB EST-MCNC: 177 MG/DL
FASTING STATUS PATIENT QL REPORTED: YES
FASTING STATUS PATIENT QL REPORTED: YES
GLUCOSE SERPL-MCNC: 138 MG/DL (ref 70–99)
GLUCOSE UR STRIP-MCNC: NEGATIVE MG/DL
HBA1C MFR BLD: 7.8 %
HCO3 SERPL-SCNC: 25 MMOL/L (ref 22–29)
HCT VFR BLD AUTO: 45.4 % (ref 40–53)
HDLC SERPL-MCNC: 45 MG/DL
HGB BLD-MCNC: 15.2 G/DL (ref 13.3–17.7)
HGB UR QL STRIP: ABNORMAL
KETONES UR STRIP-MCNC: NEGATIVE MG/DL
LDLC SERPL CALC-MCNC: 72 MG/DL
LEUKOCYTE ESTERASE UR QL STRIP: NEGATIVE
MCH RBC QN AUTO: 30.6 PG (ref 26.5–33)
MCHC RBC AUTO-ENTMCNC: 33.5 G/DL (ref 31.5–36.5)
MCV RBC AUTO: 92 FL (ref 78–100)
MICROALBUMIN UR-MCNC: 19.2 MG/L
MICROALBUMIN/CREAT UR: 9.79 MG/G CR (ref 0–17)
MUCOUS THREADS #/AREA URNS LPF: PRESENT /LPF
NITRATE UR QL: NEGATIVE
NONHDLC SERPL-MCNC: 88 MG/DL
PH UR STRIP: 5 [PH] (ref 5–9)
PLATELET # BLD AUTO: 205 10E3/UL (ref 150–450)
POTASSIUM SERPL-SCNC: 3.9 MMOL/L (ref 3.4–5.3)
PROT SERPL-MCNC: 6.8 G/DL (ref 6.4–8.3)
RBC # BLD AUTO: 4.96 10E6/UL (ref 4.4–5.9)
RBC URINE: 0 /HPF
SODIUM SERPL-SCNC: 138 MMOL/L (ref 135–145)
SP GR UR STRIP: 1.03 (ref 1–1.03)
TRIGL SERPL-MCNC: 81 MG/DL
TSH SERPL DL<=0.005 MIU/L-ACNC: 2.54 UIU/ML (ref 0.3–4.2)
UROBILINOGEN UR STRIP-MCNC: NORMAL MG/DL
WBC # BLD AUTO: 8.4 10E3/UL (ref 4–11)
WBC URINE: <1 /HPF

## 2025-08-08 PROCEDURE — 82043 UR ALBUMIN QUANTITATIVE: CPT | Mod: ZL

## 2025-08-08 PROCEDURE — 85048 AUTOMATED LEUKOCYTE COUNT: CPT | Mod: ZL

## 2025-08-08 PROCEDURE — 36415 COLL VENOUS BLD VENIPUNCTURE: CPT | Mod: ZL

## 2025-08-08 PROCEDURE — 81001 URINALYSIS AUTO W/SCOPE: CPT | Mod: ZL

## 2025-08-08 PROCEDURE — 80053 COMPREHEN METABOLIC PANEL: CPT | Mod: ZL

## 2025-08-08 PROCEDURE — G0463 HOSPITAL OUTPT CLINIC VISIT: HCPCS

## 2025-08-08 PROCEDURE — 83036 HEMOGLOBIN GLYCOSYLATED A1C: CPT | Mod: ZL

## 2025-08-08 PROCEDURE — 80061 LIPID PANEL: CPT | Mod: ZL

## 2025-08-08 PROCEDURE — 84443 ASSAY THYROID STIM HORMONE: CPT | Mod: ZL

## 2025-08-08 RX ORDER — BUDESONIDE AND FORMOTEROL FUMARATE DIHYDRATE 80; 4.5 UG/1; UG/1
1-2 AEROSOL RESPIRATORY (INHALATION) 2 TIMES DAILY
Qty: 10.2 G | Refills: 11 | Status: SHIPPED | OUTPATIENT
Start: 2025-08-08 | End: 2025-08-08

## 2025-08-08 RX ORDER — ALBUTEROL SULFATE 90 UG/1
1-2 INHALANT RESPIRATORY (INHALATION) EVERY 4 HOURS PRN
Qty: 18 G | Refills: 11 | Status: SHIPPED | OUTPATIENT
Start: 2025-08-08

## 2025-08-08 ASSESSMENT — ENCOUNTER SYMPTOMS
ARTHRALGIAS: 1
HEMATURIA: 0
WHEEZING: 0
CONFUSION: 0
FEVER: 0
DIARRHEA: 0
NAUSEA: 0
ABDOMINAL PAIN: 0
VOMITING: 0
AGITATION: 0
DIZZINESS: 0
SLEEP DISTURBANCE: 1
CHILLS: 0
BRUISES/BLEEDS EASILY: 0
WOUND: 0
SHORTNESS OF BREATH: 0
COUGH: 0
LIGHT-HEADEDNESS: 0
MYALGIAS: 0
DYSURIA: 0

## 2025-08-08 ASSESSMENT — PAIN SCALES - GENERAL: PAINLEVEL_OUTOF10: NO PAIN (0)

## 2025-08-11 ENCOUNTER — RESULTS FOLLOW-UP (OUTPATIENT)
Dept: INTERNAL MEDICINE | Facility: OTHER | Age: 70
End: 2025-08-11
Payer: COMMERCIAL

## (undated) DEVICE — CAST PLASTER SPLINT 3X15" 7393

## (undated) DEVICE — PREP CHLORAPREP 26ML TINTED ORANGE  260815

## (undated) DEVICE — ENDO KIT COMPLIANCE DYKENDOCMPLY

## (undated) DEVICE — SUCTION MANIFOLD NEPTUNE 2 SYS 4 PORT 0702-020-000

## (undated) DEVICE — SU ETHILON 3-0 FS-1 18" 669H

## (undated) DEVICE — TOURNIQUET SGL BLADDER 18"X4" RED 5921-218-135

## (undated) DEVICE — PACK MAJOR EXTREMITY SOP15MEFCA

## (undated) DEVICE — ENDO BRUSH CHANNEL MASTER CLEANING 2-4.2MM BW-412T

## (undated) DEVICE — DRILL BIT 2.0X135MM SCALED 703

## (undated) DEVICE — SU VICRYL 2-0 CT-2 27" UND J269H

## (undated) DEVICE — BLADE KNIFE SURG 15 371115

## (undated) DEVICE — ENDO TRAP POLYP E-TRAP 00711099

## (undated) DEVICE — GLOVE SENSICARE 8.5 MSG1085 LATEX FREE

## (undated) DEVICE — Device

## (undated) DEVICE — DRAPE STERI TOWEL LG 1010

## (undated) DEVICE — SOL WATER 1500ML

## (undated) DEVICE — TRAY DRY SKIN PREP TRAY PREMIUM DYND70661

## (undated) DEVICE — DRAPE TOWEL 17X27" BLUE LF DISP 28700-004

## (undated) DEVICE — ESU GROUND PAD ADULT W/CORD E7507

## (undated) DEVICE — TUBING SUCTION 10'X3/16" N510

## (undated) DEVICE — PENCIL MEGADYNE TELESCOPING SMOKE EVACUATION 10 FT 251010J

## (undated) DEVICE — ENDO SNARE EXACTO COLD 9MM LOOP 2.4MMX230CM 00711115

## (undated) DEVICE — BNDG ELASTIC 3"X5YDS UNSTERILE 6611-30

## (undated) DEVICE — DRAPE C-ARM 60X42" 1013

## (undated) DEVICE — BLADE KNIFE SURG 15 SAFETY 373915

## (undated) DEVICE — CAST PADDING 4" COTTON WEBRIL UNSTERILE 9084

## (undated) DEVICE — SU ETHILON 4-0 FS-2 18" 662G

## (undated) DEVICE — GLOVE PROTEXIS POWDER FREE SMT 8.5 2D72PT85X

## (undated) DEVICE — DRSG GAUZE 4X4" 3033

## (undated) RX ORDER — DEXAMETHASONE SODIUM PHOSPHATE 10 MG/ML
INJECTION, SOLUTION INTRAMUSCULAR; INTRAVENOUS
Status: DISPENSED
Start: 2024-10-25

## (undated) RX ORDER — ONDANSETRON 2 MG/ML
INJECTION INTRAMUSCULAR; INTRAVENOUS
Status: DISPENSED
Start: 2024-10-25

## (undated) RX ORDER — LIDOCAINE HYDROCHLORIDE 20 MG/ML
INJECTION, SOLUTION EPIDURAL; INFILTRATION; INTRACAUDAL; PERINEURAL
Status: DISPENSED
Start: 2020-07-30

## (undated) RX ORDER — PROPOFOL 10 MG/ML
INJECTION, EMULSION INTRAVENOUS
Status: DISPENSED
Start: 2024-10-25

## (undated) RX ORDER — DEXAMETHASONE SODIUM PHOSPHATE 4 MG/ML
INJECTION, SOLUTION INTRA-ARTICULAR; INTRALESIONAL; INTRAMUSCULAR; INTRAVENOUS; SOFT TISSUE
Status: DISPENSED
Start: 2024-10-25

## (undated) RX ORDER — BUPIVACAINE HYDROCHLORIDE 5 MG/ML
INJECTION, SOLUTION EPIDURAL; INTRACAUDAL
Status: DISPENSED
Start: 2024-10-25

## (undated) RX ORDER — PROPOFOL 10 MG/ML
INJECTION, EMULSION INTRAVENOUS
Status: DISPENSED
Start: 2020-07-30

## (undated) RX ORDER — CEFAZOLIN SODIUM/WATER 2 G/20 ML
SYRINGE (ML) INTRAVENOUS
Status: DISPENSED
Start: 2024-10-25

## (undated) RX ORDER — LIDOCAINE HYDROCHLORIDE 10 MG/ML
INJECTION, SOLUTION EPIDURAL; INFILTRATION; INTRACAUDAL; PERINEURAL
Status: DISPENSED
Start: 2024-10-25

## (undated) RX ORDER — DEXMEDETOMIDINE HYDROCHLORIDE 4 UG/ML
INJECTION, SOLUTION INTRAVENOUS
Status: DISPENSED
Start: 2024-10-25